# Patient Record
Sex: FEMALE | Race: WHITE | NOT HISPANIC OR LATINO | Employment: FULL TIME | URBAN - METROPOLITAN AREA
[De-identification: names, ages, dates, MRNs, and addresses within clinical notes are randomized per-mention and may not be internally consistent; named-entity substitution may affect disease eponyms.]

---

## 2017-07-17 ENCOUNTER — ALLSCRIPTS OFFICE VISIT (OUTPATIENT)
Dept: OTHER | Facility: OTHER | Age: 32
End: 2017-07-17

## 2017-07-17 DIAGNOSIS — E78.5 HYPERLIPIDEMIA: ICD-10-CM

## 2017-07-17 DIAGNOSIS — F32.9 MAJOR DEPRESSIVE DISORDER, SINGLE EPISODE: ICD-10-CM

## 2017-08-21 ENCOUNTER — ALLSCRIPTS OFFICE VISIT (OUTPATIENT)
Dept: OTHER | Facility: OTHER | Age: 32
End: 2017-08-21

## 2017-10-09 ENCOUNTER — GENERIC CONVERSION - ENCOUNTER (OUTPATIENT)
Dept: OTHER | Facility: OTHER | Age: 32
End: 2017-10-09

## 2017-10-24 ENCOUNTER — HOSPITAL ENCOUNTER (EMERGENCY)
Facility: HOSPITAL | Age: 32
Discharge: HOME | End: 2017-10-24
Attending: EMERGENCY MEDICINE | Admitting: EMERGENCY MEDICINE
Payer: COMMERCIAL

## 2017-10-24 ENCOUNTER — ALLSCRIPTS OFFICE VISIT (OUTPATIENT)
Dept: OTHER | Facility: OTHER | Age: 32
End: 2017-10-24

## 2017-10-24 VITALS
SYSTOLIC BLOOD PRESSURE: 118 MMHG | RESPIRATION RATE: 18 BRPM | DIASTOLIC BLOOD PRESSURE: 67 MMHG | HEART RATE: 83 BPM | WEIGHT: 121.03 LBS | OXYGEN SATURATION: 99 %

## 2017-10-24 DIAGNOSIS — F32.A DEPRESSION: Primary | ICD-10-CM

## 2017-10-24 DIAGNOSIS — R45.851 SUICIDAL THOUGHTS: ICD-10-CM

## 2017-10-24 LAB
AMPHETAMINES SERPL QL SCN: NEGATIVE
BARBITURATES UR QL: NEGATIVE
BENZODIAZ UR QL: NEGATIVE
COCAINE UR QL: NEGATIVE
ETHANOL EXG-MCNC: 0 MG/DL
EXT PREG TEST URINE: NEGATIVE
METHADONE UR QL: NEGATIVE
OPIATES UR QL SCN: NEGATIVE
PCP UR QL: NEGATIVE
THC UR QL: NEGATIVE

## 2017-10-24 PROCEDURE — 81025 URINE PREGNANCY TEST: CPT | Performed by: EMERGENCY MEDICINE

## 2017-10-24 PROCEDURE — 82075 ASSAY OF BREATH ETHANOL: CPT | Performed by: EMERGENCY MEDICINE

## 2017-10-24 PROCEDURE — 80307 DRUG TEST PRSMV CHEM ANLYZR: CPT | Performed by: EMERGENCY MEDICINE

## 2017-10-24 PROCEDURE — 99285 EMERGENCY DEPT VISIT HI MDM: CPT

## 2017-10-24 RX ORDER — ESCITALOPRAM OXALATE 20 MG/1
5 TABLET ORAL DAILY
COMMUNITY
End: 2018-07-16 | Stop reason: ALTCHOICE

## 2017-10-24 RX ORDER — DROSPIRENONE AND ETHINYL ESTRADIOL 0.03MG-3MG
1 KIT ORAL DAILY
COMMUNITY
End: 2018-07-16 | Stop reason: ALTCHOICE

## 2017-10-24 NOTE — DISCHARGE INSTRUCTIONS
Depression   WHAT YOU NEED TO KNOW:   What is depression? Depression is a medical condition that causes feelings of sadness or hopelessness that do not go away  Depression may cause you to lose interest in things you used to enjoy  These feelings may interfere with your daily life  What causes or increases my risk for depression? Depression may be caused by changes in brain chemicals that affect your mood  Your risk for depression may be higher if you have any of the following:  · Stressful events such as the death of a loved one, unemployment, childhood trauma, divorce, or domestic abuse    · A chronic medical condition such as diabetes, heart disease, or cancer    · Parents, siblings, or other family members with a history of depression    · Drug or alcohol abuse  What are the signs and symptoms of depression? · Appetite changes, or weight gain or loss    · Trouble going to sleep or staying asleep, or sleeping too much    · Fatigue or lack of energy    · Feeling restless, irritable, or withdrawn    · Feeling worthless, hopeless, discouraged, or guilty    · Trouble concentrating, remembering things, doing daily tasks, or making decisions    · Thoughts about hurting or killing yourself  How is depression diagnosed? Your healthcare provider will ask about your symptoms and how long you have had them  He or she will ask if you have any family members with depression  Tell your healthcare provider about any stressful events in your life  He or she may ask about any other health conditions or medicines you take  How is depression treated? · Therapy  may be used to treat your depression  A therapist will help you learn to cope with your thoughts and feelings  This can be done alone or in a group  It may also be done with family members or a significant other  · Antidepressant medicine  may be given to improve or balance your mood   You may need to take this medicine for several weeks before you begin to feel better  Tell your healthcare provider about any side effects or problems you have with your medicine  The type or amount of medicine may need to be changed  How can I manage depression? · Get regular physical activity  Try to exercise for 30 minutes, 3 to 5 days a week  Work with your healthcare provider to develop an exercise plan that you enjoy  Physical activity may improve your symptoms  · Get enough sleep  Create a routine to help you relax before bed  You can listen to music, read, or do yoga  Try to go to bed and wake up at the same time every day  Sleep is important for emotional health  · Eat a variety of healthy foods from all of the food groups  A healthy meal plan is low in fat, salt, and added sugar  Ask your healthcare provider for more information about a meal plan that is right for you  · Do not drink alcohol or use drugs  Alcohol and drugs can make your symptoms worse  Call 911 for any of the following:   · You think about harming yourself or someone else  When should I contact my healthcare provider? · Your symptoms do not improve  · You cannot make it to your next appointment  · You have new symptoms  · You have questions or concerns about your condition or care  CARE AGREEMENT:   You have the right to help plan your care  Learn about your health condition and how it may be treated  Discuss treatment options with your caregivers to decide what care you want to receive  You always have the right to refuse treatment  The above information is an  only  It is not intended as medical advice for individual conditions or treatments  Talk to your doctor, nurse or pharmacist before following any medical regimen to see if it is safe and effective for you  © 2017 [address] [employer] Information is for End User's use only and may not be sold, redistributed or otherwise used for commercial purposes   All illustrations and images included in CareNotes® are the copyrighted property of [employer]  or [address]  Suicide Prevention for Adults   WHAT YOU NEED TO KNOW:   What do I need to know about suicide prevention? A person may see suicide as the only way to escape emotional or physical pain and suffering  You can help provide emotional support for him or her and get the help he or she needs  Learn to recognize warning signs that the person may be considering suicide  Find resources to help prevent him or her from attempting to take his or her life  What should I do if I think the person is considering suicide? Call 911 if you feel the person is at immediate risk of suicide, or if he or she talks about an active suicide plan  Assume that the person intends to carry out his or her plan  Resources are available to help you and the person  The following are some things you can do:  · Call the [address] [place] [address] at 1-875-424-TALK (8225)   · Call the Suicide Hotline at 1-646-ETNFFWK (9-712.657.7422)   · Contact the person's therapist  His or her healthcare provider can give you a list of therapists if he or she does not have one  · Keep medicines, weapons, and alcohol out of the person's reach  Do not leave the person alone if he or she says they want to commit suicide  Do not leave the person alone if you think he or she may try it  Make sure you do not put yourself at risk if the person has a weapon  · Do not be afraid to ask if the person is thinking of ending his or her life  Ask if the person has a plan for hurting or killing himself or herself  What warning signs should I watch for?    · Talking about a plan for committing suicide, or suddenly deciding to make a will    · Cutting himself or herself, burning the skin with cigarettes, or driving recklessly    · Drug or alcohol use, not taking prescribed medicine, or taking too much prescribed medicine    · Sudden anger, lashing out at others, or seeming hopeless, anxious, or angry and then suddenly becoming happy or peaceful    · Not wanting to spend time with others or doing things he or she usually enjoys    · Trouble at work, or not showing up for work    · A change in the way he or she eats, sleeps, or dresses    · Weight gain or loss or having less energy than usual    · Trouble sleeping or spending a lot of time sleeping    · Giving away or throwing away his belongings  What increases the risk for suicide? · Depression or chronic sleep disorders such as insomnia    · Alcohol or drug use    · Death of an important person, or the anniversary of that person's death    · A past suicide attempt, or someone close to him or her attempted or committed suicide    · Mental illness, such as schizophrenia, bipolar disorder, or posttraumatic stress disorder (PTSD)     · Chronic pain, or a serious illness, such as heart disease, cancer, or AIDS    · Being physically dependent on others    · Mental, physical, or sexual abuse    · A history of violence or aggression toward others, or feeling guilty for hurting someone else    · Stress from divorce or a breakup, or loss of a friendship, or loneliness    · Struggling with being pruett, lesbian, or bisexual    · Stress from the loss of a job, or a stressful job  How will healthcare providers help the person? · Healthcare providers will ask questions about the person's suicide thoughts and plans  They will ask how often he or she thinks about suicide and if he or she has tried it before  They will ask if he or she hurt himself, such as with cutting or reckless driving  They may ask if he or she has access to weapons or drugs  · A healthcare provider will help the person create a safety plan  The plan includes a list of people or groups to contact if he or she has suicidal thoughts again  The list may include friends, family members, a spiritual leader, and others he or she trusts   The person may be asked to make a verbal agreement or sign a contract that he or she will not try to harm himself  What treatment may the person need? · Medicines  may be given to prevent mood swings, or to decrease anxiety or depression  The person will need to take all medicines as directed  A sudden stop can be harmful  It may take 4 to 6 weeks for the medicine to help him or her feel better  · A therapist  can help the person identify and change negative feelings or beliefs about himself or herself  This may also help change the way the he or she feels and acts  A therapist can also help the person find ways to cope with things that cannot be changed  What can I do to help the person? · Encourage the person to seek help for drug or alcohol abuse  Drugs and alcohol can increase suicidal thoughts and make the person more likely to act on them  · Help the person connect with others  Encourage him or her to become involved in the community  Some examples include tutoring a young student, volunteering at a [employer], or joining a group exercise program  The person may need help setting up a computer or creating an e-mail account to help him or her remain connected to others  · Exercise with the person  Exercise can lift his or her mood, increase energy, and make it easier to sleep at night  · Encourage the person to try new things  Adults who are open to new experiences handle stress and change better than those who are not  · Call, visit, or send postcards to the person often  Check on him or her after the loss of a pet, longtime friend, or child  Holidays, birthdays, and anniversaries can be difficult for a person after a loss  The loss of a spouse can be especially painful and lonely  · Help the person schedule a visit with his or her Yarsani or spiritual leader  A Yarsani or spiritual leader may be able to offer additional support and resources to the person       · Help the person get equipment that will increase his or her comfort and mobility  Examples are hearing aids, glasses, large print books, and walkers  These can help him or her enjoy activities and feel more independent  · Encourage the person to continue taking medicine and going to therapy  Medicine and therapy can help improve his or her mental health  Where can I find support and more information? · [facility] , [address] [place] [address]  Phone: 1- 487 - 045-HHBM (01 33 43 04 02)  Web Address: [employer]  · Suicide Awareness [employer]  [address] [place] [address] [address] , [address]  Phone: 9- 320 - 934-7282  Web Address: Vovici  org  Call 910 if:   · The person has done something on purpose to hurt himself or herself  · The person tries to commit suicide  When should I seek immediate care? · The person tells you he or she made a plan to commit suicide  · The person acts out in anger, is reckless, or is abusing alcohol or drugs  · The person has serious thoughts of suicide, even with treatment  When should I contact the person's healthcare provider or therapist?   · You begin to see warning signs that the person may be considering suicide  · The person has intense feelings of sadness, anger, revenge, or despair, or he cannot make decisions easily  · The person tells you he or she has more thoughts of suicide when they are alone  · The person withdraws from others  · The person stops eating, or begins to smoke or drink heavily  · The person feels he or she is a burden because of a disability or disease  · The person has trouble dealing with stress, such as a breakup or a job loss  · You have questions or concerns about the person's condition or care  CARE AGREEMENT:   You have the right to help plan your care  Learn about your health condition and how it may be treated   Discuss treatment options with your caregivers to decide what care you want to receive  You always have the right to refuse treatment  The above information is an  only  It is not intended as medical advice for individual conditions or treatments  Talk to your doctor, nurse or pharmacist before following any medical regimen to see if it is safe and effective for you  © 2017 [address] Information is for End User's use only and may not be sold, redistributed or otherwise used for commercial purposes  All illustrations and images included in CareNotes® are the copyrighted property of [employer]  or [address]

## 2017-10-24 NOTE — ED NOTES
Pt was moved to room #21 without incident  Pt belongings are in locker #21  Pt is cooperative and has no issues at this time        Devika Franco  10/24/17 8993

## 2017-10-24 NOTE — ED NOTES
Pt presents to ED after her PCP suggested she come for further evaluation  Intake / SA completed  Pt reports while she was at the Dr's  today she relayed that she has been having increased depression and SI, without any plan  Pt states she has had thoughts in the past, but has never acted on them and would not  Pt denies HI, A/H or V/H  Pt reports taking medication for her depression and agreed to follow up with her Psychologist as well as her PCP  She agreed that she could contract for safety and has the support of her family  Pt was provided with referrals for out patient services  She was also given the 24 hour crisis hotline  It was agreed that pt could be discharged home

## 2017-10-24 NOTE — ED PROVIDER NOTES
History  Chief Complaint   Patient presents with    Suicidal     Pt presents to the ED for evaluation of suicidal thoughts, reports having them "daily " Pt denies ever "attempting" or HI  Pt states "I have never come up with a way to do it " Pt went to PCP today to seek help and was sent here  Pt calm and cooperative on arrival     70-year-old female presents to the emergency department for evaluation of suicidal thoughts  Patient states that she has been having daily suicidal thoughts for the past 1 year or so but over the past few months they have gotten more frequent  Patient has been taking Lexapro prescribed by her PCP  Initially this seemed to help her symptoms but she does not feel that it is working now  She denies hearing voices  She denies any history of actually hurting herself  She does not have a discrete plan  She denies prior psychiatric hospitalizations  She does not follow with a psychiatrist   She denies drug use does admit to drinking alcohol occasionally  No history of abuse  History provided by:  Patient   used: No    Depression   Presenting symptoms: depression and suicidal thoughts    Presenting symptoms: no homicidal ideas and no suicide attempt    Patient accompanied by:  Family member  Degree of incapacity (severity): Moderate  Onset quality:  Gradual  Timing:  Constant  Progression:  Worsening  Chronicity:  Recurrent  Context: alcohol use    Context: not drug abuse, not recent medication change and not stressful life event    Treatment compliance: All of the time  Relieved by:  Nothing  Worsened by:  Nothing  Ineffective treatments:  Antidepressants  Associated symptoms: anhedonia, anxiety, feelings of worthlessness and irritability    Associated symptoms: no weight change    Risk factors: no hx of suicide attempts        Prior to Admission Medications   Prescriptions Last Dose Informant Patient Reported?  Taking?   drospirenone-ethinyl estradiol (Anderson Christianson) 3-0 03 MG per tablet   Yes Yes   Sig: Take 1 tablet by mouth daily   escitalopram (LEXAPRO) 20 mg tablet   Yes Yes   Sig: Take 5 mg by mouth daily      Facility-Administered Medications: None       Past Medical History:   Diagnosis Date    Depression     History of PCOS        History reviewed  No pertinent surgical history  History reviewed  No pertinent family history  I have reviewed and agree with the history as documented  Social History   Substance Use Topics    Smoking status: Never Smoker    Smokeless tobacco: Not on file    Alcohol use Yes        Review of Systems   Constitutional: Positive for irritability  Psychiatric/Behavioral: Positive for depression, dysphoric mood, sleep disturbance and suicidal ideas  Negative for homicidal ideas  The patient is nervous/anxious  All other systems reviewed and are negative  Physical Exam  ED Triage Vitals [10/24/17 1352]   Temp Pulse Respirations Blood Pressure SpO2   -- 83 18 118/67 99 %      Temp src Heart Rate Source Patient Position - Orthostatic VS BP Location FiO2 (%)   -- Monitor Sitting Right arm --      Pain Score       --           Physical Exam   Constitutional: She is oriented to person, place, and time  She appears well-developed and well-nourished  HENT:   Head: Normocephalic  Right Ear: External ear normal    Left Ear: External ear normal    Nose: Nose normal    Mouth/Throat: Oropharynx is clear and moist    Eyes: EOM and lids are normal  Pupils are equal, round, and reactive to light  Neck: Normal range of motion  Neck supple  Pulmonary/Chest: Effort normal  No respiratory distress  Musculoskeletal: Normal range of motion  She exhibits no deformity  Neurological: She is alert and oriented to person, place, and time  Skin: Skin is warm and dry  Psychiatric: Her speech is normal  Judgment normal  She is not actively hallucinating  Cognition and memory are normal  She exhibits a depressed mood   She expresses suicidal ideation  She expresses no suicidal plans  Flat affect, poor eye contact   Nursing note and vitals reviewed  ED Medications  Medications - No data to display    Diagnostic Studies  Labs Reviewed   RAPID DRUG SCREEN, URINE - Normal       Result Value Ref Range Status    Amph/Meth UR Negative  Negative Final    Barbiturate Ur Negative  Negative Final    Benzodiazepine Urine Negative  Negative Final    Cocaine Urine Negative  Negative Final    Methadone Urine Negative  Negative Final    Opiate Urine Negative  Negative Final    PCP Ur Negative  Negative Final    THC Urine Negative  Negative Final    Narrative:     FOR MEDICAL PURPOSES ONLY  IF CONFIRMATION NEEDED PLEASE CONTACT THE LAB WITHIN 5 DAYS  Drug Screen Cutoff Levels:  AMPHETAMINE/METHAMPHETAMINES  1000 ng/mL  BARBITURATES     200 ng/mL  BENZODIAZEPINES     200 ng/mL  COCAINE      300 ng/mL  METHADONE      300 ng/mL  OPIATES      300 ng/mL  PHENCYCLIDINE     25 ng/mL  THC       50 ng/mL   POCT ALCOHOL BREATH TEST - Normal    EXTBreath Alcohol 0 000   Final   POCT PREGNANCY, URINE - Normal    EXT PREG TEST UR (Ref: Negative) negative   Final       No orders to display       Procedures  Procedures      Phone Contacts  ED Phone Contact    ED Course  ED Course as of Oct 24 1909   Tue Oct 24, 2017   1559 Patient evaluated by crisis  Patient has vague chronic suicidal thoughts but no plan or intent to harm herself  She resides with her family who is very supportive  Patient is able to contract for safety  She will follow up as an outpatient with therapy and partial program   Discussed signs and symptoms to return                                  MDM  Number of Diagnoses or Management Options  Depression: new and requires workup  Suicidal thoughts: new and requires workup     Amount and/or Complexity of Data Reviewed  Clinical lab tests: reviewed and ordered  Review and summarize past medical records: yes  Discuss the patient with other providers: yes    Risk of Complications, Morbidity, and/or Mortality  General comments: 62-year-old female with chronic depression and recurrent suicidal thoughts  Patient has no discrete plan and is able to contract for safety  She was evaluated by crisis who has provided her with outpatient referrals  Discussed signs and symptoms to return to the emergency department  Patient Progress  Patient progress: stable    CritCare Time    Disposition  Final diagnoses:   Depression   Suicidal thoughts     ED Disposition     ED Disposition Condition Comment    Discharge  [facility] discharge to home/self care  Condition at discharge: Stable        Follow-up Information     Follow up With Specialties Details Why Contact Info    Monse Ugalde MD Family Medicine Schedule an appointment as soon as possible for a visit in 1 day For recheck of current symptoms [address] [place] [address]  [phone]          Discharge Medication List as of 10/24/2017  4:08 PM      CONTINUE these medications which have NOT CHANGED    Details   drospirenone-ethinyl estradiol (AGATA) 3-0 03 MG per tablet Take 1 tablet by mouth daily, Historical Med      escitalopram (LEXAPRO) 20 mg tablet Take 5 mg by mouth daily, Historical Med           No discharge procedures on file      ED Provider  Electronically Signed by       Frantz Parker DO  10/24/17 5508

## 2017-12-21 ENCOUNTER — ALLSCRIPTS OFFICE VISIT (OUTPATIENT)
Dept: OTHER | Facility: OTHER | Age: 32
End: 2017-12-21

## 2018-01-08 ENCOUNTER — GENERIC CONVERSION - ENCOUNTER (OUTPATIENT)
Dept: OTHER | Facility: OTHER | Age: 33
End: 2018-01-08

## 2018-01-13 VITALS
DIASTOLIC BLOOD PRESSURE: 62 MMHG | OXYGEN SATURATION: 98 % | HEIGHT: 58 IN | WEIGHT: 123 LBS | BODY MASS INDEX: 25.82 KG/M2 | RESPIRATION RATE: 16 BRPM | SYSTOLIC BLOOD PRESSURE: 98 MMHG | HEART RATE: 80 BPM

## 2018-01-13 VITALS
SYSTOLIC BLOOD PRESSURE: 118 MMHG | WEIGHT: 120.38 LBS | OXYGEN SATURATION: 98 % | DIASTOLIC BLOOD PRESSURE: 72 MMHG | HEART RATE: 81 BPM | RESPIRATION RATE: 17 BRPM | HEIGHT: 58 IN | BODY MASS INDEX: 25.27 KG/M2

## 2018-01-14 VITALS
SYSTOLIC BLOOD PRESSURE: 120 MMHG | DIASTOLIC BLOOD PRESSURE: 70 MMHG | HEIGHT: 58 IN | HEART RATE: 76 BPM | BODY MASS INDEX: 25.19 KG/M2 | RESPIRATION RATE: 16 BRPM | WEIGHT: 120 LBS

## 2018-01-18 NOTE — PROGRESS NOTES
Assessment    1  Arthralgia of temporomandibular joint (524 62) (M26 62)    Plan  Arthralgia of temporomandibular joint    · Medrol 4 MG Oral Tablet Therapy Pack (MethylPREDNISolone); take as directed    Discussion/Summary  Discussion Summary:   Stop ibuprofen  Take prednisone as directed  Warm or cold compresses to right side of face  Avoid opening mouth very wide  Avoid excessive chewing  Soft foods  If advancing numbness or noted rash of face, proceed to further medical care  Follow-up with family doctor as needed  Medication Side Effects Reviewed: Possible side effects of new medications were reviewed with the patient/guardian today  Understands and agrees with treatment plan: The treatment plan was reviewed with the patient/guardian  The patient/guardian understands and agrees with the treatment plan   Counseling Documentation With Imm: The patient was counseled regarding instructions for management  Chief Complaint    1  Facial Pain  Chief Complaint Free Text Note Form: Pt states has been feeling facial pain on right side with numbness,pt denies any trauma or injured, pt feeling also right ear pain with slighty sore throat  History of Present Illness  HPI: 65-year-old female with pain in her right jaw area that started Monday  She noted it when she was opening her mouth real wide to eat  It has been progressively worsening  She now notices some discomfort in the ear with a mild sore throat and a little bit and numbness tingling at the angle of her jaw  No fever or chills rashes difficulty breathing chest pain      Review of Systems  Focused-Female:   Constitutional: No fever, no chills, feels well, no tiredness, no recent weight gain or loss  ENT: as noted in HPI  Cardiovascular: no complaints of slow or fast heart rate, no chest pain, no palpitations, no leg claudication or lower extremity edema     Respiratory: no complaints of shortness of breath, no wheezing, no dyspnea on exertion, no orthopnea or PND  Musculoskeletal: no complaints of arthralgia, no myalgia, no joint swelling or stiffness, no limb pain or swelling  Integumentary: no complaints of skin rash or lesion, no itching or dry skin, no skin wounds  Neurological: as noted in HPI  Active Problems    1  Fatigue (780 79) (R53 83)   2  Hirsutism (704 1) (L68 0)   3  Major depression (296 20) (F32 9)   4  Polycystic ovarian syndrome (256 4) (E28 2)   5  Polycystic ovarian syndrome (256 4) (E28 2)   6  Pre-syncope (780 2) (R55)   7  Severe single current episode of major depressive disorder, without psychotic features   (296 23) (F32 2)   8  Well adult on routine health check (V70 0) (Z00 00)    Past Medical History    1  Abnormal weight gain (783 1) (R63 5)   2  History of Acute upper respiratory infection (465 9) (J06 9)   3  History of Acute upper respiratory infection (465 9) (J06 9)   4  History of Cat Bite (E906 3)   5  History of Cellulitis of arm (682 3) (L03 119)   6  Dysmenorrhea (625 3) (N94 6)   7  History of abdominal pain (V13 89) (Z87 898)   8  History of acne (V13 3) (Z87 2)   9  History of tinea corporis (V12 09) (Z86 19)   10  Type 2 diabetes mellitus (250 00) (E11 9)  Active Problems And Past Medical History Reviewed: The active problems and past medical history were reviewed and updated today  Family History  Mother    1  Family history of depression (V17 0) (Z81 8)  Family History Reviewed: The family history was reviewed and updated today  Social History    · Alcohol Use (History)   · Caffeine Use   · Never A Smoker   · Occupation:  Social History Reviewed: The social history was reviewed and updated today  Surgical History    1  Denied: History of Recent Surgery  Surgical History Reviewed: The surgical history was reviewed and updated today  Current Meds   1  Ocella 3-0 03 MG Oral Tablet; TAKE 1 TABLET DAILY AS DIRECTED;    Therapy: 25Apr2013 to (Evaluate:07Nov2013)  Requested for: 63NAO5625; Last   Rx:[id number] Ordered   2  Sertraline HCl - 100 MG Oral Tablet; TAKE 1 TABLET DAILY; Therapy: 78SPS1770 to (Evaluate:09Oct2016)  Requested for: 60QNP7514; Last   Rx:[id number] Ordered   3  Sertraline HCl - 25 MG Oral Tablet; TAKE 1 TABLET DAILY AS DIRECTED; Therapy: 10AOD1451 to (Evaluate:17Oct2016)  Requested for: 59CWK5241; Last   Rx:[id number] Ordered  Medication List Reviewed: The medication list was reviewed and updated today  Allergies    1  Amoxil TABS    Vitals  Signs   Recorded: 08QOI4597 75:50UM   Systolic: 457  Diastolic: 68  Heart Rate: 92  Respiration: 18  Temperature: 98 F  O2 Saturation: 97  Height: 4 ft 10 in  Weight: 118 lb 2 oz  BMI Calculated: 24 69  BSA Calculated: 1 46    Physical Exam    Constitutional   General appearance: No acute distress, well appearing and well nourished  Eyes   Conjunctiva and lids: No swelling, erythema or discharge  Pupils and irises: Equal, round and reactive to light  Ears, Nose, Mouth, and Throat   External inspection of ears and nose: Normal     Otoscopic examination: Tympanic membranes translucent with normal light reflex  Canals patent without erythema  Nasal mucosa, septum, and turbinates: Normal without edema or erythema  Oropharynx: Abnormal   Right TMJ tender to palpation  Limited range of motion of jaw  Pulmonary   Respiratory effort: No increased work of breathing or signs of respiratory distress  Auscultation of lungs: Clear to auscultation  Cardiovascular   Palpation of heart: Normal PMI, no thrills  Auscultation of heart: Normal rate and rhythm, normal S1 and S2, without murmurs  Lymphatic   Palpation of lymph nodes in neck: No lymphadenopathy  Good range of motion of neck  Musculoskeletal   Gait and station: Normal     Digits and nails: Normal without clubbing or cyanosis      Inspection/palpation of joints, bones, and muscles: Normal     Skin   Skin and subcutaneous tissue: Normal without rashes or lesions  No abnormal lesions of right ear or face  Neurologic   Cranial nerves: Cranial nerves 2-12 intact  Except for abnormality of sensation angle of jaw  Sensation: Abnormal   Mild decreased sensation angle of jaw on right with light touch versus left  Psychiatric   Orientation to person, place, and time: Normal     Mood and affect: Normal     Additional Exam:  Symmetrical facial features  Moves all extremities well  Gait is normal       Future Appointments    Date/Time Provider Specialty Site   08/16/2016 01:00 PM Elizabeth Duncan, [facility]  [place]     Signatures  Electronically signed by :  Charissa López, [facility]; Jul 23 2016 11:35AM EST                       (Author)

## 2018-01-22 VITALS
DIASTOLIC BLOOD PRESSURE: 68 MMHG | WEIGHT: 123 LBS | HEART RATE: 76 BPM | SYSTOLIC BLOOD PRESSURE: 114 MMHG | BODY MASS INDEX: 25.82 KG/M2 | RESPIRATION RATE: 16 BRPM | HEIGHT: 58 IN

## 2018-01-23 VITALS
WEIGHT: 127 LBS | HEART RATE: 78 BPM | TEMPERATURE: 98.8 F | RESPIRATION RATE: 16 BRPM | HEIGHT: 58 IN | DIASTOLIC BLOOD PRESSURE: 62 MMHG | BODY MASS INDEX: 26.66 KG/M2 | SYSTOLIC BLOOD PRESSURE: 100 MMHG | OXYGEN SATURATION: 98 %

## 2018-01-23 NOTE — MISCELLANEOUS
Message  Return to work or school:   Jailyn Stephen is under my professional care  She was seen in my office on 12/21/2017        Calista Coppola PA-C        Signatures   Electronically signed by : Eloy Aly, ; Dec 21 2017 11:54AM EST                       (Author)

## 2018-01-24 VITALS
SYSTOLIC BLOOD PRESSURE: 110 MMHG | HEIGHT: 58 IN | HEART RATE: 83 BPM | OXYGEN SATURATION: 99 % | RESPIRATION RATE: 14 BRPM | WEIGHT: 129 LBS | BODY MASS INDEX: 27.08 KG/M2 | DIASTOLIC BLOOD PRESSURE: 62 MMHG

## 2018-02-05 ENCOUNTER — TELEPHONE (OUTPATIENT)
Dept: FAMILY MEDICINE CLINIC | Facility: CLINIC | Age: 33
End: 2018-02-05

## 2018-02-05 DIAGNOSIS — F32.A DEPRESSION, UNSPECIFIED DEPRESSION TYPE: Primary | ICD-10-CM

## 2018-02-05 NOTE — TELEPHONE ENCOUNTER
Natalie Graham called and stated that you would not refill any of her meds until she is seen by Psych  Her therapist is on maternity leave and she is trying to get in with [facility] but will need to be referred by you  Is this possible to do for her?

## 2018-02-28 ENCOUNTER — TELEPHONE (OUTPATIENT)
Dept: FAMILY MEDICINE CLINIC | Facility: CLINIC | Age: 33
End: 2018-02-28

## 2018-02-28 NOTE — TELEPHONE ENCOUNTER
Pt states you started her on lexapro and she feels that it is not working  She would like to stop taking it and is asking for guidelines on how to wean herself off of it  I offered her an appointment with you to discuss this in person and to see if there was anything further she should do, but she declined and just ask that I send you a message

## 2018-03-01 NOTE — TELEPHONE ENCOUNTER
Please call patient back and let her know that I would like her to come in for a follow-up to discuss other treatment options and weaning off of the lexapro

## 2018-04-06 RX ORDER — DROSPIRENONE AND ETHINYL ESTRADIOL 0.03MG-3MG
1 KIT ORAL DAILY
COMMUNITY
Start: 2013-04-25 | End: 2020-04-06

## 2018-04-06 RX ORDER — BUPROPION HYDROCHLORIDE 150 MG/1
300 TABLET ORAL
COMMUNITY
Start: 2018-03-06 | End: 2022-05-26 | Stop reason: DRUGHIGH

## 2018-07-16 ENCOUNTER — OFFICE VISIT (OUTPATIENT)
Dept: FAMILY MEDICINE CLINIC | Facility: CLINIC | Age: 33
End: 2018-07-16
Payer: COMMERCIAL

## 2018-07-16 VITALS
HEIGHT: 59 IN | DIASTOLIC BLOOD PRESSURE: 60 MMHG | WEIGHT: 129 LBS | RESPIRATION RATE: 16 BRPM | OXYGEN SATURATION: 98 % | BODY MASS INDEX: 26 KG/M2 | HEART RATE: 98 BPM | SYSTOLIC BLOOD PRESSURE: 100 MMHG

## 2018-07-16 DIAGNOSIS — K21.9 GASTROESOPHAGEAL REFLUX DISEASE WITHOUT ESOPHAGITIS: ICD-10-CM

## 2018-07-16 DIAGNOSIS — Z13.220 SCREENING, LIPID: ICD-10-CM

## 2018-07-16 DIAGNOSIS — R19.8 CHANGE IN BOWEL MOVEMENT: Primary | ICD-10-CM

## 2018-07-16 PROBLEM — E28.2 PCOS (POLYCYSTIC OVARIAN SYNDROME): Status: ACTIVE | Noted: 2018-03-02

## 2018-07-16 PROBLEM — E78.5 HYPERLIPIDEMIA: Status: ACTIVE | Noted: 2017-07-17

## 2018-07-16 PROBLEM — F41.9 ANXIETY: Status: ACTIVE | Noted: 2017-08-21

## 2018-07-16 PROBLEM — F33.1 MODERATE EPISODE OF RECURRENT MAJOR DEPRESSIVE DISORDER (HCC): Status: ACTIVE | Noted: 2018-03-02

## 2018-07-16 PROBLEM — F32.A DEPRESSION: Status: ACTIVE | Noted: 2017-07-17

## 2018-07-16 PROCEDURE — 99214 OFFICE O/P EST MOD 30 MIN: CPT | Performed by: FAMILY MEDICINE

## 2018-07-16 RX ORDER — OMEPRAZOLE 40 MG/1
40 CAPSULE, DELAYED RELEASE ORAL DAILY
Qty: 30 CAPSULE | Refills: 1 | Status: SHIPPED | OUTPATIENT
Start: 2018-07-16 | End: 2018-10-11

## 2018-07-16 RX ORDER — VILAZODONE HYDROCHLORIDE 10 MG/1
10 TABLET ORAL
COMMUNITY
End: 2018-09-10 | Stop reason: ALTCHOICE

## 2018-07-16 NOTE — ASSESSMENT & PLAN NOTE
She has change in bowel movement and abdominal cramping for last 1 months since she has been started on Paxil and later it was stopped and she is on Viibryd, she will follow up with psychiatrist in 2 weeks  Advised to discussed with him as there is all symptoms could be side effect from the [address]    She has acid reflux advised to take omeprazole , and she will also try to take probiotic and advised to follow up in 3  weeks

## 2018-07-16 NOTE — PROGRESS NOTES
Assessment/Plan:    Problem List Items Addressed This Visit        Digestive    Gastroesophageal reflux disease without esophagitis    Relevant Medications    omeprazole (PriLOSEC) 40 MG capsule       Other    Change in bowel movement - Primary     She has change in bowel movement and abdominal cramping for last 1 months since she has been started on Paxil and later it was stopped and she is on Viibryd, she will follow up with psychiatrist in 2 weeks  Advised to discussed with him as there is all symptoms could be side effect from the [address]  She has acid reflux advised to take omeprazole , and she will also try to take probiotic and advised to follow up in 3  weeks         Relevant Orders    CBC and differential    Comprehensive metabolic panel    Screening, lipid    Relevant Orders    Lipid panel          Chief Complaint   Patient presents with    Abdominal Cramping     for 1 month       Subjective:   Patient ID: Nataly Chowdhury is a 34 y o  female  She says for last 1 month she has change in her bowel movements, and she has lose bowel movement twice per day and the next day she get no bowel movement so this has been going on for 1 month and she says that for she follows psychiatrist Dr River Celestin in [facility] and he has been treating her depression and she is on Wellbutrin 300 mg and she was started on Paxil then she had this change in bowel movements, he stop that and now she is on Viibryd, for last 3 weeks and she has next follow up with him on July 30th,  She feels cramping in her abdomen intermittently she denies any distension she denies any blood in the stool  She feels acid reflux burning sensation in the chest and she denies any vomiting but she feels slight nausea        Review of Systems   Constitutional: Negative for activity change, appetite change, chills, diaphoresis, fatigue, fever and unexpected weight change     HENT: Negative for congestion, dental problem, ear discharge, ear pain, facial swelling, hearing loss, mouth sores, nosebleeds, postnasal drip, rhinorrhea, sinus pain, sinus pressure, sneezing, sore throat, trouble swallowing and voice change  Eyes: Negative for photophobia, pain, discharge, redness and itching  Respiratory: Negative for cough, chest tightness, shortness of breath and wheezing  Cardiovascular: Negative for chest pain, palpitations and leg swelling  Gastrointestinal: Positive for diarrhea  Negative for abdominal distention, abdominal pain, blood in stool, constipation and nausea  Endocrine: Negative for cold intolerance, heat intolerance, polydipsia, polyphagia and polyuria  Genitourinary: Negative for dysuria, flank pain, frequency, hematuria and urgency  Musculoskeletal: Negative for arthralgias, back pain, myalgias and neck pain  Skin: Negative for color change and pallor  Allergic/Immunologic: Negative for environmental allergies and food allergies  Neurological: Negative for dizziness, weakness, light-headedness, numbness and headaches  Hematological: Negative for adenopathy  Does not bruise/bleed easily  Psychiatric/Behavioral: Negative for behavioral problems, sleep disturbance and suicidal ideas  The patient is not nervous/anxious  Objective:  Physical Exam   Constitutional: She is oriented to person, place, and time  She appears well-developed and well-nourished  HENT:   Head: Normocephalic and atraumatic  Nose: Nose normal    Mouth/Throat: Oropharynx is clear and moist  No oropharyngeal exudate  Eyes: Conjunctivae and EOM are normal  Pupils are equal, round, and reactive to light  Right eye exhibits no discharge  Left eye exhibits no discharge  No scleral icterus  Neck: Normal range of motion  Neck supple  No tracheal deviation present  No thyromegaly present  Cardiovascular: Normal rate, regular rhythm and normal heart sounds  No murmur heard    Pulmonary/Chest: Effort normal and breath sounds normal  No respiratory distress  She has no wheezes  She has no rales  Abdominal: Soft  Bowel sounds are normal  She exhibits no distension and no mass  There is no tenderness  There is no rebound and no guarding  Musculoskeletal: Normal range of motion  She exhibits no edema  Lymphadenopathy:     She has no cervical adenopathy  Neurological: She is alert and oriented to person, place, and time  She has normal reflexes  No cranial nerve deficit  Skin: Skin is warm  No rash noted  No erythema  No pallor  Psychiatric: She has a normal mood and affect  Her behavior is normal  Judgment and thought content normal    Nursing note and vitals reviewed  No past surgical history on file      Family History   Problem Relation Age of Onset    Depression Mother          Current Outpatient Prescriptions:     buPROPion (WELLBUTRIN XL) 150 mg 24 hr tablet, Take 300 mg by mouth  , Disp: , Rfl:     drospirenone-ethinyl estradiol (OCELLA) 3-0 03 MG per tablet, Take 1 tablet by mouth daily, Disp: , Rfl:     vilazodone (VIIBRYD) 10 mg tablet, Take 10 mg by mouth daily with breakfast Take 5mg od, Disp: , Rfl:     omeprazole (PriLOSEC) 40 MG capsule, Take 1 capsule (40 mg total) by mouth daily, Disp: 30 capsule, Rfl: 1    Allergies   Allergen Reactions    Amoxicillin GI Intolerance       Vitals:    07/16/18 0901   BP: 100/60   Pulse: 98   Resp: 16   SpO2: 98%   Weight: 58 5 kg (129 lb)   Height: 4' 11" (1 499 m)

## 2018-09-10 ENCOUNTER — OFFICE VISIT (OUTPATIENT)
Dept: FAMILY MEDICINE CLINIC | Facility: CLINIC | Age: 33
End: 2018-09-10
Payer: COMMERCIAL

## 2018-09-10 VITALS
RESPIRATION RATE: 16 BRPM | HEIGHT: 59 IN | BODY MASS INDEX: 26 KG/M2 | SYSTOLIC BLOOD PRESSURE: 100 MMHG | OXYGEN SATURATION: 98 % | DIASTOLIC BLOOD PRESSURE: 62 MMHG | HEART RATE: 88 BPM | WEIGHT: 129 LBS

## 2018-09-10 DIAGNOSIS — R19.8 CHANGE IN BOWEL MOVEMENT: Primary | ICD-10-CM

## 2018-09-10 DIAGNOSIS — E88.09 HYPOALBUMINEMIA: ICD-10-CM

## 2018-09-10 DIAGNOSIS — R10.30 LOWER ABDOMINAL PAIN: ICD-10-CM

## 2018-09-10 PROBLEM — K21.9 GASTROESOPHAGEAL REFLUX DISEASE WITHOUT ESOPHAGITIS: Status: RESOLVED | Noted: 2018-07-16 | Resolved: 2018-09-10

## 2018-09-10 PROCEDURE — 3008F BODY MASS INDEX DOCD: CPT | Performed by: FAMILY MEDICINE

## 2018-09-10 PROCEDURE — 99214 OFFICE O/P EST MOD 30 MIN: CPT | Performed by: FAMILY MEDICINE

## 2018-09-10 RX ORDER — DICYCLOMINE HYDROCHLORIDE 10 MG/1
10 CAPSULE ORAL
Qty: 20 CAPSULE | Refills: 0 | Status: SHIPPED | OUTPATIENT
Start: 2018-09-10 | End: 2018-10-11

## 2018-09-10 NOTE — ASSESSMENT & PLAN NOTE
GI symptoms include abdominal discomfort change in bowel movements and nausea vomiting, omeprazole did not improve symptoms, advised to stop taking sugar and less carb is advised also advised to have low-fat diet    Her labs are reviewed which shows slight [place] neutrophil but total WBC count is normal and had been and calcium is slightly low discussed about improving her diet and recheck labs in 2 months,  Ela to follow-up with gastroenterologist for the work and try been till abdominal pain

## 2018-09-10 NOTE — PROGRESS NOTES
Assessment/Plan:    Problem List Items Addressed This Visit        Other    Change in bowel movement - Primary     GI symptoms include abdominal discomfort change in bowel movements and nausea vomiting, omeprazole did not improve symptoms, advised to stop taking sugar and less carb is advised also advised to have low-fat diet  Her labs are reviewed which shows slight [place] neutrophil but total WBC count is normal and had been and calcium is slightly low discussed about improving her diet and recheck labs in 2 months,  Ela to follow-up with gastroenterologist for the work and try been till abdominal pain         Relevant Medications    dicyclomine (BENTYL) 10 mg capsule    Other Relevant Orders    Ambulatory referral to Gastroenterology    Lower abdominal pain    Relevant Medications    dicyclomine (BENTYL) 10 mg capsule    Other Relevant Orders    Ambulatory referral to Gastroenterology    Hypoalbuminemia    Relevant Orders    Comprehensive metabolic panel          Chief Complaint   Patient presents with    Follow-up       Subjective:   Patient ID: Nisa Lobo is a 34 y o  female  She has abdominal intermittent pain and cramping with sometimes loose to normal bowel movements for about 2 months now she tried to stop the lactulose with no improvement, she also tried different foods but no change, pain can be stabbing but it is mostly in the lower abdomen bilateral and other times it is mild discomfort, it can last up to 30 minutes  Denies fever chills but she get nausea and sometimes vomits she has no family history of colitis but she has 1 family member colon cancer, she says 1 time ultrasound abdomen was done for the gallbladder which was normal except she had sludge    She was given omeprazole without any improvement in her symptoms  She also follows psychiatrist and initially she took Paxil then [place] and her symptoms are going on since then psychiatrist   Gretchen Shrestha but her symptoms do not improve, and she is on Wellbutrin for long time        Review of Systems   Constitutional: Negative for activity change, appetite change, chills, diaphoresis, fatigue, fever and unexpected weight change  HENT: Negative for congestion, dental problem, ear discharge, ear pain, facial swelling, hearing loss, mouth sores, nosebleeds, postnasal drip, rhinorrhea, sinus pain, sinus pressure, sneezing, sore throat, trouble swallowing and voice change  Eyes: Negative for photophobia, pain, discharge, redness and itching  Respiratory: Negative for cough, chest tightness, shortness of breath and wheezing  Cardiovascular: Negative for chest pain, palpitations and leg swelling  Gastrointestinal: Positive for abdominal pain, nausea and vomiting  Negative for abdominal distention, blood in stool, constipation and diarrhea  Endocrine: Negative for cold intolerance, heat intolerance, polydipsia, polyphagia and polyuria  Genitourinary: Negative for dysuria, flank pain, frequency, hematuria and urgency  Musculoskeletal: Negative for arthralgias, back pain, myalgias and neck pain  Skin: Negative for color change and pallor  Allergic/Immunologic: Negative for environmental allergies and food allergies  Neurological: Negative for dizziness, weakness, light-headedness, numbness and headaches  Hematological: Negative for adenopathy  Does not bruise/bleed easily  Psychiatric/Behavioral: Negative for behavioral problems, sleep disturbance and suicidal ideas  The patient is not nervous/anxious  Objective:  Physical Exam   Constitutional: She is oriented to person, place, and time  She appears well-developed and well-nourished  HENT:   Head: Normocephalic and atraumatic  Nose: Nose normal    Mouth/Throat: Oropharynx is clear and moist  No oropharyngeal exudate  Eyes: Conjunctivae and EOM are normal  Pupils are equal, round, and reactive to light  Right eye exhibits no discharge   Left eye exhibits no discharge  No scleral icterus  Neck: Normal range of motion  Neck supple  No tracheal deviation present  No thyromegaly present  Cardiovascular: Normal rate, regular rhythm and normal heart sounds  No murmur heard  Pulmonary/Chest: Effort normal and breath sounds normal  No respiratory distress  She has no wheezes  She has no rales  Abdominal: Soft  Bowel sounds are normal  She exhibits no distension and no mass  There is no tenderness  There is no rebound  Minimal tenderness bilateral lower abdomen   Musculoskeletal: Normal range of motion  She exhibits no edema  Lymphadenopathy:     She has no cervical adenopathy  Neurological: She is alert and oriented to person, place, and time  She has normal reflexes  No cranial nerve deficit  Skin: Skin is warm  No rash noted  No erythema  No pallor  Psychiatric: She has a normal mood and affect  Her behavior is normal  Judgment and thought content normal    Nursing note and vitals reviewed  No past surgical history on file      Family History   Problem Relation Age of Onset    Depression Mother          Current Outpatient Prescriptions:     buPROPion (WELLBUTRIN XL) 150 mg 24 hr tablet, Take 300 mg by mouth  , Disp: , Rfl:     drospirenone-ethinyl estradiol (OCELLA) 3-0 03 MG per tablet, Take 1 tablet by mouth daily, Disp: , Rfl:     dicyclomine (BENTYL) 10 mg capsule, Take 1 capsule (10 mg total) by mouth 3 (three) times a day before meals, Disp: 20 capsule, Rfl: 0    omeprazole (PriLOSEC) 40 MG capsule, Take 1 capsule (40 mg total) by mouth daily (Patient not taking: Reported on 9/10/2018 ), Disp: 30 capsule, Rfl: 1    Allergies   Allergen Reactions    Amoxicillin GI Intolerance       Vitals:    09/10/18 1101   BP: 100/62   Pulse: 88   Resp: 16   SpO2: 98%   Weight: 58 5 kg (129 lb)   Height: 4' 11" (1 499 m)

## 2018-10-08 ENCOUNTER — OFFICE VISIT (OUTPATIENT)
Dept: GASTROENTEROLOGY | Facility: AMBULARY SURGERY CENTER | Age: 33
End: 2018-10-08
Payer: COMMERCIAL

## 2018-10-08 VITALS
HEART RATE: 83 BPM | HEIGHT: 59 IN | DIASTOLIC BLOOD PRESSURE: 80 MMHG | TEMPERATURE: 98.5 F | RESPIRATION RATE: 18 BRPM | SYSTOLIC BLOOD PRESSURE: 124 MMHG | WEIGHT: 126.4 LBS | BODY MASS INDEX: 25.48 KG/M2

## 2018-10-08 DIAGNOSIS — R10.13 EPIGASTRIC PAIN: Primary | ICD-10-CM

## 2018-10-08 DIAGNOSIS — R11.0 NAUSEA: ICD-10-CM

## 2018-10-08 DIAGNOSIS — R10.30 LOWER ABDOMINAL PAIN: ICD-10-CM

## 2018-10-08 PROCEDURE — 99244 OFF/OP CNSLTJ NEW/EST MOD 40: CPT | Performed by: INTERNAL MEDICINE

## 2018-10-08 RX ORDER — RANITIDINE 150 MG/1
150 CAPSULE ORAL 2 TIMES DAILY
Qty: 60 CAPSULE | Refills: 3 | Status: SHIPPED | OUTPATIENT
Start: 2018-10-08 | End: 2020-04-06

## 2018-10-08 NOTE — PROGRESS NOTES
Consultation - [facility]  Tremayne Perez 34 y o  female MRN: [id number]          Assessment & Plan:    Pleasant 82-year-old female with a history of depression, 1 to 2 months of persistent intermittent epigastric abdominal pain with associated nausea  1   Epigastric abdominal pain and nausea:  Symptoms did not respond to PPI therapy or dicyclomine, differential includes peptic ulcer disease, reflux esophagitis, biliary colic  It is also quite possible that the Wellbutrin is the cause of her symptoms  -we will start her on ranitidine 150 mg twice daily  -we will check a right upper quadrant ultrasound  -we will proceed with an upper endoscopy to evaluate for the above process is  -discussed with her the risks of the procedure including bleeding, surgery, perforation      _____________________________________________________________        CC:  Epigastric pain and discomfort    HPI:  Tremayne Perez is a 34 y  o female who was referred for evaluation of epigastric pain and discomfort  As you know this is a pleasant 82-year-old female with history of mild to moderate depression, she reports over the last several months she has had intermittent epigastric abdominal pain with a pressure-like sensation  Associated with some nausea, sometimes she the nausea is associated with hunger type pains  She had 1 episode of vomiting  She occasionally has some associated acid reflux with a nausea as well  Additionally through that she has had some change in bowel movements with summer increased urgency though her stools have been formed  Those symptoms have actually in 5 resolved  Her epigastric discomfort can be worse after eating  It can last up to 20 min  She tried 1 month PPI therapy with no improvement in her symptoms  She was also prescribed dicyclomine empirically with no improvement as well  There all this her weight has been stable  She takes rare NSAIDs    Her antidepressant had been changed, previously she had been on Lexapro and switched to Wellbutrin over the past several months  She reports that her depression anxiety fairly well controlled  No significant surgical history  Denies any tobacco   Rarely drinks  She works as a   There is a history of colon cancer in an uncle  The patient had some recent laboratory studies including LFTs and CBC which were normal         ROS:  The remainder of the ROS was negative except for the pertinent positives mentioned in HPI  Allergies: Amoxicillin    Medications:   Current Outpatient Prescriptions:     buPROPion (WELLBUTRIN XL) 150 mg 24 hr tablet, Take 300 mg by mouth  , Disp: , Rfl:     drospirenone-ethinyl estradiol (OCELLA) 3-0 03 MG per tablet, Take 1 tablet by mouth daily, Disp: , Rfl:     dicyclomine (BENTYL) 10 mg capsule, Take 1 capsule (10 mg total) by mouth 3 (three) times a day before meals (Patient not taking: Reported on 10/8/2018 ), Disp: 20 capsule, Rfl: 0    omeprazole (PriLOSEC) 40 MG capsule, Take 1 capsule (40 mg total) by mouth daily (Patient not taking: Reported on 9/10/2018 ), Disp: 30 capsule, Rfl: 1    ranitidine (ZANTAC) 150 MG capsule, Take 1 capsule (150 mg total) by mouth 2 (two) times a day, Disp: 60 capsule, Rfl: 3'    Past Medical History:   Diagnosis Date    Abnormal weight gain 03/23/2006    Acne 03/23/2006    Depression     Diabetes mellitus, type II ([facility] [address] ) 02/23/2006    Dysfunction of both eustachian tubes     last assessed 12/21/17    History of PCOS     Polycystic ovarian syndrome 01/30/2009    Severe single current episode of major depressive disorder, without psychotic features ([facility] [address] )     last assessed 06/21/16    Suicidal ideation     last assessed 10/24/17       No past surgical history on file  Family History   Problem Relation Age of Onset    Depression Mother         reports that she has never smoked   She does not have any smokeless tobacco history on file  She reports that she drinks alcohol  She reports that she does not use drugs            Physical Exam:     /80 (BP Location: Left arm, Patient Position: Sitting, Cuff Size: Standard)   Pulse 83   Temp 98 5 °F (36 9 °C) (Tympanic)   Resp 18   Ht 4' 11" (1 499 m)   Wt 57 3 kg (126 lb 6 4 oz)   BMI 25 53 kg/m²     Gen: wn/wd, NAD, somewhat flat affect  HEENT: anicteric, MMM, no cervical LAD  CVS: RRR, no m/r/g  CHEST: CTA b/l  ABD: +BS, soft, mild epigastric discomfort with deep palpation  EXT: no c/c/e  NEURO: aaox3  SKIN: NO rashes

## 2018-10-08 NOTE — LETTER
October 8, 2018     Rosa Crenshaw MD  [id number] [facility] [address]  [place] [address]    Patient: Laura Klein   YOB: 1985   Date of Visit: 10/8/2018       Dear Dr Kerry Hickey:    Thank you for referring Sheila Diaz to me for evaluation  Below are my notes for this consultation  If you have questions, please do not hesitate to call me  I look forward to following your patient along with you  Sincerely,        Javier Crespo MD        CC: No Recipients  Javier Crespo MD  10/8/2018 12:58 PM  Sign at close encounter  Consultation - [address] [facility]  Laura Klein 34 y o  female MRN: [id number]          Assessment & Plan:    Pleasant 29-year-old female with a history of depression, 1 to 2 months of persistent intermittent epigastric abdominal pain with associated nausea  1   Epigastric abdominal pain and nausea:  Symptoms did not respond to PPI therapy or dicyclomine, differential includes peptic ulcer disease, reflux esophagitis, biliary colic  It is also quite possible that the Wellbutrin is the cause of her symptoms  -we will start her on ranitidine 150 mg twice daily  -we will check a right upper quadrant ultrasound  -we will proceed with an upper endoscopy to evaluate for the above process is  -discussed with her the risks of the procedure including bleeding, surgery, perforation      _____________________________________________________________        CC:  Epigastric pain and discomfort    HPI:  Laura Klein is a 34 y  o female who was referred for evaluation of epigastric pain and discomfort  As you know this is a pleasant 29-year-old female with history of mild to moderate depression, she reports over the last several months she has had intermittent epigastric abdominal pain with a pressure-like sensation  Associated with some nausea, sometimes she the nausea is associated with hunger type pains  She had 1 episode of vomiting   She occasionally has some associated acid reflux with a nausea as well  Additionally through that she has had some change in bowel movements with summer increased urgency though her stools have been formed  Those symptoms have actually in 5 resolved  Her epigastric discomfort can be worse after eating  It can last up to 20 min  She tried 1 month PPI therapy with no improvement in her symptoms  She was also prescribed dicyclomine empirically with no improvement as well  There all this her weight has been stable  She takes rare NSAIDs  Her antidepressant had been changed, previously she had been on Lexapro and switched to Wellbutrin over the past several months  She reports that her depression anxiety fairly well controlled  No significant surgical history  Denies any tobacco   Rarely drinks  She works as a   There is a history of colon cancer in an uncle  The patient had some recent laboratory studies including LFTs and CBC which were normal         ROS:  The remainder of the ROS was negative except for the pertinent positives mentioned in HPI           Allergies: Amoxicillin    Medications:   Current Outpatient Prescriptions:     buPROPion (WELLBUTRIN XL) 150 mg 24 hr tablet, Take 300 mg by mouth  , Disp: , Rfl:     drospirenone-ethinyl estradiol (OCELLA) 3-0 03 MG per tablet, Take 1 tablet by mouth daily, Disp: , Rfl:     dicyclomine (BENTYL) 10 mg capsule, Take 1 capsule (10 mg total) by mouth 3 (three) times a day before meals (Patient not taking: Reported on 10/8/2018 ), Disp: 20 capsule, Rfl: 0    omeprazole (PriLOSEC) 40 MG capsule, Take 1 capsule (40 mg total) by mouth daily (Patient not taking: Reported on 9/10/2018 ), Disp: 30 capsule, Rfl: 1    ranitidine (ZANTAC) 150 MG capsule, Take 1 capsule (150 mg total) by mouth 2 (two) times a day, Disp: 60 capsule, Rfl: 3'    Past Medical History:   Diagnosis Date    Abnormal weight gain 03/23/2006    Acne 03/23/2006    Depression     Diabetes mellitus, type II ([facility] ) 02/23/2006    Dysfunction of both eustachian tubes     last assessed 12/21/17    History of PCOS     Polycystic ovarian syndrome 01/30/2009    Severe single current episode of major depressive disorder, without psychotic features ([facility] )     last assessed 06/21/16    Suicidal ideation     last assessed 10/24/17       No past surgical history on file  Family History   Problem Relation Age of Onset    Depression Mother         reports that she has never smoked  She does not have any smokeless tobacco history on file  She reports that she drinks alcohol  She reports that she does not use drugs            Physical Exam:     /80 (BP Location: Left arm, Patient Position: Sitting, Cuff Size: Standard)   Pulse 83   Temp 98 5 °F (36 9 °C) (Tympanic)   Resp 18   Ht 4' 11" (1 499 m)   Wt 57 3 kg (126 lb 6 4 oz)   BMI 25 53 kg/m²      Gen: wn/wd, NAD, somewhat flat affect  HEENT: anicteric, MMM, no cervical LAD  CVS: RRR, no m/r/g  CHEST: CTA b/l  ABD: +BS, soft, mild epigastric discomfort with deep palpation  EXT: no c/c/e  NEURO: aaox3  SKIN: NO rashes

## 2018-10-11 ENCOUNTER — APPOINTMENT (EMERGENCY)
Dept: CT IMAGING | Facility: HOSPITAL | Age: 33
End: 2018-10-11
Payer: COMMERCIAL

## 2018-10-11 ENCOUNTER — OFFICE VISIT (OUTPATIENT)
Dept: FAMILY MEDICINE CLINIC | Facility: CLINIC | Age: 33
End: 2018-10-11
Payer: COMMERCIAL

## 2018-10-11 ENCOUNTER — HOSPITAL ENCOUNTER (EMERGENCY)
Facility: HOSPITAL | Age: 33
Discharge: HOME | End: 2018-10-11
Attending: EMERGENCY MEDICINE | Admitting: EMERGENCY MEDICINE
Payer: COMMERCIAL

## 2018-10-11 VITALS
HEIGHT: 59 IN | HEART RATE: 92 BPM | OXYGEN SATURATION: 98 % | WEIGHT: 125 LBS | BODY MASS INDEX: 25.2 KG/M2 | DIASTOLIC BLOOD PRESSURE: 60 MMHG | SYSTOLIC BLOOD PRESSURE: 100 MMHG | TEMPERATURE: 97.3 F | RESPIRATION RATE: 16 BRPM

## 2018-10-11 VITALS
OXYGEN SATURATION: 99 % | RESPIRATION RATE: 17 BRPM | HEART RATE: 78 BPM | TEMPERATURE: 98.9 F | SYSTOLIC BLOOD PRESSURE: 95 MMHG | DIASTOLIC BLOOD PRESSURE: 55 MMHG

## 2018-10-11 DIAGNOSIS — R10.12 LEFT UPPER QUADRANT PAIN: Primary | ICD-10-CM

## 2018-10-11 DIAGNOSIS — R10.12 LEFT UPPER QUADRANT ABDOMINAL PAIN OF UNKNOWN ETIOLOGY: Primary | ICD-10-CM

## 2018-10-11 LAB
ALBUMIN SERPL BCP-MCNC: 3.6 G/DL (ref 3.5–5)
ALP SERPL-CCNC: 51 U/L (ref 46–116)
ALT SERPL W P-5'-P-CCNC: 17 U/L (ref 12–78)
ANION GAP SERPL CALCULATED.3IONS-SCNC: 8 MMOL/L (ref 4–13)
AST SERPL W P-5'-P-CCNC: 8 U/L (ref 5–45)
BASOPHILS # BLD AUTO: 0.03 THOUSANDS/ΜL (ref 0–0.1)
BASOPHILS NFR BLD AUTO: 0 % (ref 0–1)
BILIRUB SERPL-MCNC: 0.1 MG/DL (ref 0.2–1)
BILIRUB UR QL STRIP: NEGATIVE
BUN SERPL-MCNC: 10 MG/DL (ref 5–25)
CALCIUM SERPL-MCNC: 8.9 MG/DL (ref 8.3–10.1)
CHLORIDE SERPL-SCNC: 104 MMOL/L (ref 100–108)
CLARITY UR: CLEAR
CO2 SERPL-SCNC: 28 MMOL/L (ref 21–32)
COLOR UR: YELLOW
CREAT SERPL-MCNC: 0.71 MG/DL (ref 0.6–1.3)
EOSINOPHIL # BLD AUTO: 0.07 THOUSAND/ΜL (ref 0–0.61)
EOSINOPHIL NFR BLD AUTO: 1 % (ref 0–6)
ERYTHROCYTE [DISTWIDTH] IN BLOOD BY AUTOMATED COUNT: 13.7 % (ref 11.6–15.1)
EXT PREG TEST URINE: NEGATIVE
GFR SERPL CREATININE-BSD FRML MDRD: 114 ML/MIN/1.73SQ M
GLUCOSE SERPL-MCNC: 107 MG/DL (ref 65–140)
GLUCOSE UR STRIP-MCNC: NEGATIVE MG/DL
HCT VFR BLD AUTO: 42 % (ref 34.8–46.1)
HGB BLD-MCNC: 13.3 G/DL (ref 11.5–15.4)
HGB UR QL STRIP.AUTO: NEGATIVE
IMM GRANULOCYTES # BLD AUTO: 0.02 THOUSAND/UL (ref 0–0.2)
IMM GRANULOCYTES NFR BLD AUTO: 0 % (ref 0–2)
KETONES UR STRIP-MCNC: NEGATIVE MG/DL
LDH SERPL-CCNC: 111 U/L (ref 81–234)
LEUKOCYTE ESTERASE UR QL STRIP: NEGATIVE
LIPASE SERPL-CCNC: 203 U/L (ref 73–393)
LYMPHOCYTES # BLD AUTO: 1.87 THOUSANDS/ΜL (ref 0.6–4.47)
LYMPHOCYTES NFR BLD AUTO: 27 % (ref 14–44)
MCH RBC QN AUTO: 29.4 PG (ref 26.8–34.3)
MCHC RBC AUTO-ENTMCNC: 31.7 G/DL (ref 31.4–37.4)
MCV RBC AUTO: 93 FL (ref 82–98)
MONOCYTES # BLD AUTO: 0.35 THOUSAND/ΜL (ref 0.17–1.22)
MONOCYTES NFR BLD AUTO: 5 % (ref 4–12)
NEUTROPHILS # BLD AUTO: 4.52 THOUSANDS/ΜL (ref 1.85–7.62)
NEUTS SEG NFR BLD AUTO: 67 % (ref 43–75)
NITRITE UR QL STRIP: NEGATIVE
NRBC BLD AUTO-RTO: 0 /100 WBCS
PH UR STRIP.AUTO: 6 [PH] (ref 4.5–8)
PLATELET # BLD AUTO: 330 THOUSANDS/UL (ref 149–390)
PMV BLD AUTO: 9.4 FL (ref 8.9–12.7)
POTASSIUM SERPL-SCNC: 3.9 MMOL/L (ref 3.5–5.3)
PROT SERPL-MCNC: 7.4 G/DL (ref 6.4–8.2)
PROT UR STRIP-MCNC: NEGATIVE MG/DL
RBC # BLD AUTO: 4.52 MILLION/UL (ref 3.81–5.12)
SODIUM SERPL-SCNC: 140 MMOL/L (ref 136–145)
SP GR UR STRIP.AUTO: 1.02 (ref 1–1.03)
URATE SERPL-MCNC: 2.3 MG/DL (ref 2–6.8)
UROBILINOGEN UR QL STRIP.AUTO: 0.2 E.U./DL
WBC # BLD AUTO: 6.86 THOUSAND/UL (ref 4.31–10.16)

## 2018-10-11 PROCEDURE — 96360 HYDRATION IV INFUSION INIT: CPT

## 2018-10-11 PROCEDURE — 74177 CT ABD & PELVIS W/CONTRAST: CPT

## 2018-10-11 PROCEDURE — 85025 COMPLETE CBC W/AUTO DIFF WBC: CPT | Performed by: EMERGENCY MEDICINE

## 2018-10-11 PROCEDURE — 83690 ASSAY OF LIPASE: CPT | Performed by: EMERGENCY MEDICINE

## 2018-10-11 PROCEDURE — 99213 OFFICE O/P EST LOW 20 MIN: CPT | Performed by: FAMILY MEDICINE

## 2018-10-11 PROCEDURE — 36415 COLL VENOUS BLD VENIPUNCTURE: CPT | Performed by: EMERGENCY MEDICINE

## 2018-10-11 PROCEDURE — 99284 EMERGENCY DEPT VISIT MOD MDM: CPT

## 2018-10-11 PROCEDURE — 81003 URINALYSIS AUTO W/O SCOPE: CPT

## 2018-10-11 PROCEDURE — 83615 LACTATE (LD) (LDH) ENZYME: CPT | Performed by: EMERGENCY MEDICINE

## 2018-10-11 PROCEDURE — 80053 COMPREHEN METABOLIC PANEL: CPT | Performed by: EMERGENCY MEDICINE

## 2018-10-11 PROCEDURE — 96361 HYDRATE IV INFUSION ADD-ON: CPT

## 2018-10-11 PROCEDURE — 84550 ASSAY OF BLOOD/URIC ACID: CPT | Performed by: EMERGENCY MEDICINE

## 2018-10-11 PROCEDURE — 81025 URINE PREGNANCY TEST: CPT | Performed by: EMERGENCY MEDICINE

## 2018-10-11 RX ADMIN — IOHEXOL 100 ML: 350 INJECTION, SOLUTION INTRAVENOUS at 20:17

## 2018-10-11 RX ADMIN — SODIUM CHLORIDE 500 ML: 0.9 INJECTION, SOLUTION INTRAVENOUS at 19:30

## 2018-10-11 NOTE — PROGRESS NOTES
Assessment/Plan:    Problem List Items Addressed This Visit        Other    Left upper quadrant pain - Primary     Spoke with the ER physician and patient is sent to the ER for further evaluation, patient has a palpable tender mass in the left upper quadrant  Hemoccult test is negative               Chief Complaint   Patient presents with    Chest Pain       Subjective:   Patient ID: Axel Gonzalez is a 34 y o  female  She came today with complain of left upper abdominal pain around the rib area and pain is constant moderate to severe 7 to 8/10 increased with deep breath and did not relieved with ibuprofen, no radiation of the pain, denies any fever but she has nausea, she also noted her stool was darker in color this morning, she is already being evaluated by gastroenterologist for upper abdominal discomfort and change in bowel movements, she is scheduled for right upper abdominal ultrasound and then she will go for endoscopy, but recently she has developed this new pain,   She denies vomiting she denies any diarrhea  Chest Pain    Associated symptoms include abdominal pain  Pertinent negatives include no back pain, cough, diaphoresis, dizziness, fever, headaches, nausea, numbness, palpitations, shortness of breath or weakness  Review of Systems   Constitutional: Negative for activity change, appetite change, chills, diaphoresis, fatigue, fever and unexpected weight change  HENT: Negative for congestion, dental problem, ear discharge, ear pain, facial swelling, hearing loss, mouth sores, nosebleeds, postnasal drip, rhinorrhea, sinus pain, sinus pressure, sneezing, sore throat, trouble swallowing and voice change  Eyes: Negative for photophobia, pain, discharge, redness and itching  Respiratory: Negative for cough, chest tightness, shortness of breath and wheezing  Cardiovascular: Negative for chest pain, palpitations and leg swelling     Gastrointestinal: Positive for abdominal pain  Negative for abdominal distention, anal bleeding, constipation, diarrhea and nausea  Endocrine: Negative for cold intolerance, heat intolerance, polydipsia, polyphagia and polyuria  Genitourinary: Negative for dysuria, flank pain, frequency, hematuria and urgency  Musculoskeletal: Negative for arthralgias, back pain, myalgias and neck pain  Skin: Negative for color change and pallor  Allergic/Immunologic: Negative for environmental allergies and food allergies  Neurological: Negative for dizziness, weakness, light-headedness, numbness and headaches  Hematological: Negative for adenopathy  Does not bruise/bleed easily  Psychiatric/Behavioral: Negative for behavioral problems, sleep disturbance and suicidal ideas  The patient is not nervous/anxious  Objective:  Physical Exam   Constitutional: She is oriented to person, place, and time  She appears well-developed and well-nourished  HENT:   Head: Normocephalic and atraumatic  Nose: Nose normal    Mouth/Throat: Oropharynx is clear and moist  No oropharyngeal exudate  Eyes: Pupils are equal, round, and reactive to light  Conjunctivae and EOM are normal  Right eye exhibits no discharge  Left eye exhibits no discharge  No scleral icterus  Neck: Normal range of motion  Neck supple  No tracheal deviation present  No thyromegaly present  Cardiovascular: Normal rate, regular rhythm and normal heart sounds  No murmur heard  Pulmonary/Chest: Effort normal and breath sounds normal  No respiratory distress  She has no wheezes  She has no rales  Abdominal: Soft  Bowel sounds are normal  She exhibits no distension and no mass  There is tenderness  There is no rebound  Mass is palpable below the left leg ribs in the left upper abdomen quadrant   Musculoskeletal: Normal range of motion  She exhibits no edema  Lymphadenopathy:     She has no cervical adenopathy  Neurological: She is alert and oriented to person, place, and time   She has normal reflexes  No cranial nerve deficit  Skin: Skin is warm  No rash noted  No erythema  No pallor  Psychiatric: She has a normal mood and affect  Her behavior is normal  Judgment and thought content normal    Nursing note and vitals reviewed  No past surgical history on file      Family History   Problem Relation Age of Onset    Depression Mother          Current Outpatient Prescriptions:     buPROPion (WELLBUTRIN XL) 150 mg 24 hr tablet, Take 300 mg by mouth  , Disp: , Rfl:     drospirenone-ethinyl estradiol (OCELLA) 3-0 03 MG per tablet, Take 1 tablet by mouth daily, Disp: , Rfl:     ranitidine (ZANTAC) 150 MG capsule, Take 1 capsule (150 mg total) by mouth 2 (two) times a day, Disp: 60 capsule, Rfl: 3    Allergies   Allergen Reactions    Amoxicillin GI Intolerance       Vitals:    10/11/18 1514   BP: 100/60   Pulse: 92   Resp: 16   Temp: (!) 97 3 °F (36 3 °C)   SpO2: 98%   Weight: 56 7 kg (125 lb)   Height: 4' 11" (1 499 m)

## 2018-10-11 NOTE — ED PROVIDER NOTES
History  Chief Complaint   Patient presents with    Flank Pain     pt presents from PCP for left sided flank pain x 3 days, took Ibuprofen today without relief      33 YR FEMALE C/O INITIALLY OF 2 MONTHS OF LUQ PAIN -- INITIALLY INTERMITENT WITH NO TRIGGERING FACTORS -  LASTING 20 MINUTES AT A TIME-- OVER LAST 3 DAYS WITH CONSTANT SHARP LUQ PAIN --  WITH 6 LBS WEIGHT LOSS- FATIGUE - NAUSEA- NO SIGN  GERD/ DYSPEPSIA/ PROGRESSIVE DSYPHAGIA NO MELENA/BRBPR- - PAIN IS WRORSE UPON DEEP INSPIRATION  --  BUT NO CP/SOBG/COUGH /  NO HX OF VTE- NO GOMEZ/ - PT WENT TO PMD  TODAY AND SENT TO ER FOR LUQ PAIN/ ? MASS- NO /GYN COMPS         History provided by:  Patient   used: No    Flank Pain   Associated symptoms: fatigue and nausea    Associated symptoms: no chills, no constipation, no diarrhea, no fever and no vomiting        Prior to Admission Medications   Prescriptions Last Dose Informant Patient Reported? Taking? buPROPion (WELLBUTRIN XL) 150 mg 24 hr tablet  Self Yes No   Sig: Take 300 mg by mouth     drospirenone-ethinyl estradiol (OCELLA) 3-0 03 MG per tablet   Yes No   Sig: Take 1 tablet by mouth daily   ranitidine (ZANTAC) 150 MG capsule   No No   Sig: Take 1 capsule (150 mg total) by mouth 2 (two) times a day      Facility-Administered Medications: None       Past Medical History:   Diagnosis Date    Abnormal weight gain 03/23/2006    Acne 03/23/2006    Depression     Diabetes mellitus, type II ([facility] [address] ) 02/23/2006    Dysfunction of both eustachian tubes     last assessed 12/21/17    History of PCOS     Polycystic ovarian syndrome 01/30/2009    Severe single current episode of major depressive disorder, without psychotic features ([facility] [address] )     last assessed 06/21/16    Suicidal ideation     last assessed 10/24/17       History reviewed  No pertinent surgical history      Family History   Problem Relation Age of Onset    Depression Mother      I have reviewed and agree with the history as documented  Social History   Substance Use Topics    Smoking status: Never Smoker    Smokeless tobacco: Not on file    Alcohol use Yes      Comment: socially        Review of Systems   Constitutional: Positive for fatigue and unexpected weight change  Negative for activity change, appetite change, chills, diaphoresis and fever  HENT: Negative  Eyes: Negative  Respiratory: Negative  Cardiovascular: Negative  Gastrointestinal: Positive for abdominal pain and nausea  Negative for abdominal distention, anal bleeding, blood in stool, constipation, diarrhea, rectal pain and vomiting  Endocrine: Negative  Genitourinary: Positive for flank pain  Skin: Negative  Allergic/Immunologic: Negative  Neurological: Negative  Hematological: Negative  Psychiatric/Behavioral: Negative  Physical Exam  Physical Exam   Constitutional: She is oriented to person, place, and time  No distress  AVSS-- PULSE OX 99 % ON RA- INTERPRETATION IS NORMAL- NO INTERVENTION - WELL APPEARING- IN NAD    HENT:   Head: Normocephalic and atraumatic  Eyes: Pupils are equal, round, and reactive to light  Conjunctivae and EOM are normal  Right eye exhibits no discharge  Left eye exhibits no discharge  No scleral icterus  MM PINK   Neck: Normal range of motion  Neck supple  No JVD present  No tracheal deviation present  No thyromegaly present  Cardiovascular: Normal rate, regular rhythm, normal heart sounds and intact distal pulses  Exam reveals no gallop and no friction rub  No murmur heard  Pulmonary/Chest: Effort normal and breath sounds normal  No stridor  No respiratory distress  She has no wheezes  She has no rales  She exhibits no tenderness  Abdominal: Soft  Bowel sounds are normal  She exhibits no distension and no mass  There is tenderness  There is no rebound and no guarding  No hernia     NO CVA TENDERNESS- POS LUQ PAIN- NO DEFINITE  SPLENOMEGALY- NO PERITONEAL SIGNS   Musculoskeletal: Normal range of motion  She exhibits no edema, tenderness or deformity  NORMAL/EQUAL BILATERAL RADIAL/DP PULSES- NO BLE EDEMA/CLAF TENDERNESS/ASSYM/ ERYTHEMA   Lymphadenopathy:     She has no cervical adenopathy  Neurological: She is alert and oriented to person, place, and time  No cranial nerve deficit or sensory deficit  She exhibits normal muscle tone  Coordination normal    Skin: Skin is warm  Capillary refill takes less than 2 seconds  No rash noted  She is not diaphoretic  No erythema  No pallor  Psychiatric: She has a normal mood and affect  Her behavior is normal  Judgment and thought content normal    Nursing note and vitals reviewed        Vital Signs  ED Triage Vitals   Temperature Pulse Respirations Blood Pressure SpO2   10/11/18 1828 10/11/18 1827 10/11/18 1827 10/11/18 1827 10/11/18 1827   98 9 °F (37 2 °C) 101 16 113/54 98 %      Temp Source Heart Rate Source Patient Position - Orthostatic VS BP Location FiO2 (%)   10/11/18 1827 10/11/18 1827 -- -- --   Oral Monitor         Pain Score       --                  Vitals:    10/11/18 1827 10/11/18 1830   BP: 113/54 113/54   Pulse: 101 100       Visual Acuity      ED Medications  Medications   sodium chloride 0 9 % bolus 500 mL (not administered)       Diagnostic Studies  Results Reviewed     Procedure Component Value Units Date/Time    CBC and differential [[id number]]     Lab Status:  No result Specimen:  Blood     CMP [[id number]]     Lab Status:  No result Specimen:  Blood     Lipase [[id number]]     Lab Status:  No result Specimen:  Blood     POCT pregnancy, urine [[id number]]     Lab Status:  No result Specimen:  Urine     LD,Blood [[id number]]     Lab Status:  No result Specimen:  Blood     Uric acid [[id number]]     Lab Status:  No result Specimen:  Blood                  No orders to display              Procedures  Procedures       Phone Contacts  ED Phone Contact    ED Course  ED Course as of Oct 12 0155   Thu Oct 11, 2018   1300 [facility] MD MEDICAL PROCEDURE NOTE FOR TRANSABD BEDSIDE U/S;  IMAGES ON CHART- NO FLUID IN RUQ/LUQ/ AROUND HEART- NO R/L HYDRONEPHROSIS    1848 - ER MD NOTE- ON CARE EVERYWHERE  7/28/18 LAB S AT [facility] REVIEWED BY ER MD     1848 ER MD NOTE- PT OFFERED PAIN MEDICATION -- WANTS NO PAIN MEDICATION AT THIS TIME    2037 - ER MD NOTE- PT  RESTING COMFORTABLY IN NAD- AWARE OF WAITING FOR CT REPORT                                MDM  CritCare Time    Disposition  Final diagnoses:   None     ED Disposition     None      Follow-up Information    None         Patient's Medications   Discharge Prescriptions    No medications on file     No discharge procedures on file      ED Provider  Electronically Signed by           Vika Poole MD  10/12/18 5610

## 2018-10-11 NOTE — ASSESSMENT & PLAN NOTE
Spoke with the ER physician and patient is sent to the ER for further evaluation, patient has a palpable tender mass in the left upper quadrant  Hemoccult test is negative

## 2018-10-12 ENCOUNTER — ANESTHESIA EVENT (OUTPATIENT)
Dept: PERIOP | Facility: AMBULARY SURGERY CENTER | Age: 33
End: 2018-10-12
Payer: COMMERCIAL

## 2018-10-12 NOTE — DISCHARGE INSTRUCTIONS
DIAGNOSIS; LEFT UPPER ABDOMINAL PAIN     - ACTIVITY AS TOLERATED--- FROR PAIN- WOULD TRY OVER THE COUNTER TYLENOL 500 MG EVERY 4 HRS     - PLEASE RECONTACT YOUR PRIMARY DOCTOR TOMORROW  TO SCHEDULE AN APPOINTMENT FOR A RECHECK WITHIN 1 WEEK- AND TO DISCUSS FURTHER WORKUP     - PLEASE RETURN TO  THE ER FOR ANY NEW/ WORSENING/CONCERNING SYMPTOMS TO YOU

## 2018-10-15 ENCOUNTER — TRANSITIONAL CARE MANAGEMENT (OUTPATIENT)
Dept: FAMILY MEDICINE CLINIC | Facility: CLINIC | Age: 33
End: 2018-10-15

## 2018-10-15 ENCOUNTER — HOSPITAL ENCOUNTER (OUTPATIENT)
Dept: RADIOLOGY | Age: 33
Discharge: HOME | End: 2018-10-15
Payer: COMMERCIAL

## 2018-10-15 DIAGNOSIS — R10.13 EPIGASTRIC PAIN: ICD-10-CM

## 2018-10-15 DIAGNOSIS — R11.0 NAUSEA: ICD-10-CM

## 2018-10-15 PROCEDURE — 76705 ECHO EXAM OF ABDOMEN: CPT

## 2018-10-17 ENCOUNTER — TELEPHONE (OUTPATIENT)
Dept: GASTROENTEROLOGY | Facility: AMBULARY SURGERY CENTER | Age: 33
End: 2018-10-17

## 2018-10-17 NOTE — TELEPHONE ENCOUNTER
----- Message from Arthur Cisse MD sent at 10/17/2018  9:35 AM EDT -----  Please inform patient that ultrasound is normal, no evidence of gallstones  Normal appearing liver  Please find if her symptoms have improved with PPI therapy and make sure she is scheduled for the upper endoscopy  Please have the patient call with any questions or concerns

## 2018-10-24 ENCOUNTER — HOSPITAL ENCOUNTER (OUTPATIENT)
Facility: AMBULARY SURGERY CENTER | Age: 33
Setting detail: OUTPATIENT SURGERY
Discharge: HOME | End: 2018-10-24
Attending: INTERNAL MEDICINE | Admitting: INTERNAL MEDICINE
Payer: COMMERCIAL

## 2018-10-24 ENCOUNTER — ANESTHESIA (OUTPATIENT)
Dept: PERIOP | Facility: AMBULARY SURGERY CENTER | Age: 33
End: 2018-10-24
Payer: COMMERCIAL

## 2018-10-24 VITALS
WEIGHT: 125 LBS | TEMPERATURE: 97 F | RESPIRATION RATE: 18 BRPM | DIASTOLIC BLOOD PRESSURE: 55 MMHG | HEIGHT: 59 IN | OXYGEN SATURATION: 100 % | SYSTOLIC BLOOD PRESSURE: 102 MMHG | BODY MASS INDEX: 25.2 KG/M2 | HEART RATE: 77 BPM

## 2018-10-24 DIAGNOSIS — R11.0 NAUSEA: ICD-10-CM

## 2018-10-24 DIAGNOSIS — R10.13 EPIGASTRIC PAIN: ICD-10-CM

## 2018-10-24 DIAGNOSIS — R10.30 LOWER ABDOMINAL PAIN: ICD-10-CM

## 2018-10-24 LAB — EXT PREGNANCY TEST URINE: NEGATIVE

## 2018-10-24 PROCEDURE — 88342 IMHCHEM/IMCYTCHM 1ST ANTB: CPT | Performed by: PATHOLOGY

## 2018-10-24 PROCEDURE — 43239 EGD BIOPSY SINGLE/MULTIPLE: CPT | Performed by: INTERNAL MEDICINE

## 2018-10-24 PROCEDURE — 88305 TISSUE EXAM BY PATHOLOGIST: CPT | Performed by: PATHOLOGY

## 2018-10-24 PROCEDURE — 81025 URINE PREGNANCY TEST: CPT | Performed by: INTERNAL MEDICINE

## 2018-10-24 RX ORDER — SUCRALFATE ORAL 1 G/10ML
1 SUSPENSION ORAL 4 TIMES DAILY
Qty: 420 ML | Refills: 0 | Status: SHIPPED | OUTPATIENT
Start: 2018-10-24 | End: 2019-03-06 | Stop reason: ALTCHOICE

## 2018-10-24 RX ORDER — IBUPROFEN 800 MG/1
800 TABLET ORAL 2 TIMES DAILY
COMMUNITY
End: 2018-10-29

## 2018-10-24 RX ORDER — PROPOFOL 10 MG/ML
INJECTION, EMULSION INTRAVENOUS AS NEEDED
Status: DISCONTINUED | OUTPATIENT
Start: 2018-10-24 | End: 2018-10-24 | Stop reason: SURG

## 2018-10-24 RX ORDER — LIDOCAINE HYDROCHLORIDE 10 MG/ML
INJECTION, SOLUTION INFILTRATION; PERINEURAL AS NEEDED
Status: DISCONTINUED | OUTPATIENT
Start: 2018-10-24 | End: 2018-10-24 | Stop reason: SURG

## 2018-10-24 RX ORDER — SODIUM CHLORIDE 9 MG/ML
125 INJECTION, SOLUTION INTRAVENOUS CONTINUOUS
Status: DISCONTINUED | OUTPATIENT
Start: 2018-10-24 | End: 2018-10-24 | Stop reason: HOSPADM

## 2018-10-24 RX ADMIN — LIDOCAINE HYDROCHLORIDE 50 MG: 10 INJECTION, SOLUTION INFILTRATION; PERINEURAL at 11:06

## 2018-10-24 RX ADMIN — SODIUM CHLORIDE: 0.9 INJECTION, SOLUTION INTRAVENOUS at 10:38

## 2018-10-24 RX ADMIN — PROPOFOL 50 MG: 10 INJECTION, EMULSION INTRAVENOUS at 11:06

## 2018-10-24 RX ADMIN — PROPOFOL 50 MG: 10 INJECTION, EMULSION INTRAVENOUS at 11:07

## 2018-10-24 RX ADMIN — PROPOFOL 50 MG: 10 INJECTION, EMULSION INTRAVENOUS at 11:09

## 2018-10-24 NOTE — DISCHARGE INSTRUCTIONS
Discharge home  Resume regular diet  Continue ranitidine  Trial of Carafate  Follow up biopsy results  Call with any abdominal pain, bleeding, fevers  If not improved consider PPI therapy

## 2018-10-24 NOTE — ANESTHESIA POSTPROCEDURE EVALUATION
Post-Op Assessment Note      CV Status:  Stable    Mental Status:  Awake    Hydration Status:  Euvolemic    PONV Controlled:  Controlled    Airway Patency:  Patent    Post Op Vitals Reviewed: Yes          Staff: CRNA           BP   115/83   Temp      Pulse  80   Resp   22   SpO2   99 RA

## 2018-10-24 NOTE — DISCHARGE INSTR - AVS FIRST PAGE
ESOPHAGOGASTRODUODENOSCOPY    PROCEDURE: EGD    SEDATION: Monitored anesthesia care, check anesthesia records    ASA Class: 2    INDICATIONS:  Epigastric pain nausea    CONSENT:  Informed consent was obtained for the procedure, including sedation after explaining the risks and benefits of the procedure  Risks including but not limited to bleeding, perforation, infection, and missed lesion  PREPARATION:   Telemetry, pulse oximetry, blood pressure were monitored throughout the procedure  Patient was identified by myself both verbally and by visual inspection of ID band  DESCRIPTION:   Patient was placed in the left lateral decubitus position and was sedated with the above medication  The gastroscope was introduced in to the oropharynx and the esophagus was intubated under direct visualization  Scope was passed down the esophagus up to 2nd part of the duodenum  A careful inspection was made as the gastroscope was withdrawn, including a retroflexed view of the stomach; findings and interventions are described below  FINDINGS:    #1  Esophagus- normal esophagus, no evidence of reflux esophagitis    #2  Stomach- gastritis with linear gastric ulcers and erosions in the antrum, biopsies taken for H pylori  On retroflexion there is a small hiatal hernia    #3  Duodenum- normal duodenum biopsies taken         IMPRESSIONS:      Normal duodenum, biopsied  Gastritis with linear ulcers/erosions, biopsies taken  Small hiatal hernia on retroflexion  No evidence of esophagitis, normal appearing esophagus    RECOMMENDATIONS:     Discharge home  Resume regular diet  Continue ranitidine  Trial of Carafate  Follow up biopsy results  Call with any abdominal pain, bleeding, fevers  If not improved consider PPI therapy    COMPLICATIONS:  None; patient tolerated the procedure well            DISPOSITION: PACU           CONDITION: Stable      t

## 2018-10-24 NOTE — H&P
History and Physical -  [facility]  Ita Blanchard 34 y o  female MRN: [id number]    HPI: Ita Blanchard is a 34y o  year old female who presents with epigastric pain and nausea  Review of Systems    Historical Information   Past Medical History:   Diagnosis Date    Abnormal weight gain 03/23/2006    Acne 03/23/2006    Depression     Dysfunction of both eustachian tubes     last assessed 12/21/17    History of PCOS     Hyperlipidemia     Polycystic ovarian syndrome 01/30/2009    Severe single current [facility] major [facility]     last assessed 06/21/16    Suicidal ideation     last assessed 10/24/17     Past Surgical History:   Procedure Laterality Date    WISDOM TOOTH EXTRACTION       Social History   History   Alcohol Use    Yes     Comment: socially     History   Drug Use No     History   Smoking Status    Never Smoker   Smokeless Tobacco    Never Used     Family History   Problem Relation Age of Onset    Depression Mother        Meds/Allergies     Prescriptions Prior to Admission   Medication    buPROPion (WELLBUTRIN XL) 150 mg 24 hr tablet    drospirenone-ethinyl estradiol (OCELLA) 3-0 03 MG per tablet    ibuprofen (MOTRIN) 800 mg tablet    ranitidine (ZANTAC) 150 MG capsule       Allergies   Allergen Reactions    Amoxicillin GI Intolerance       Objective     Blood pressure 112/58, pulse 79, temperature 97 8 °F (36 6 °C), temperature source Temporal, resp  rate 18, height 4' 11" (1 499 m), weight 56 7 kg (125 lb), SpO2 100 %  PHYSICAL EXAM    Gen: NAD  CV: RRR  CHEST: Clear  ABD: soft, NT/ND  EXT: no edema  Neuro: AAO      ASSESSMENT/PLAN:  This is a 34y o  year old female here for  epigastric pain and nausea         PLAN:   Procedure: Amy August

## 2018-10-24 NOTE — OP NOTE
ESOPHAGOGASTRODUODENOSCOPY    PROCEDURE: EGD    SEDATION: Monitored anesthesia care, check anesthesia records    ASA Class: 2    INDICATIONS:  Epigastric pain nausea    CONSENT:  Informed consent was obtained for the procedure, including sedation after explaining the risks and benefits of the procedure  Risks including but not limited to bleeding, perforation, infection, and missed lesion  PREPARATION:   Telemetry, pulse oximetry, blood pressure were monitored throughout the procedure  Patient was identified by myself both verbally and by visual inspection of ID band  DESCRIPTION:   Patient was placed in the left lateral decubitus position and was sedated with the above medication  The gastroscope was introduced in to the oropharynx and the esophagus was intubated under direct visualization  Scope was passed down the esophagus up to 2nd part of the duodenum  A careful inspection was made as the gastroscope was withdrawn, including a retroflexed view of the stomach; findings and interventions are described below  FINDINGS:    #1  Esophagus- normal esophagus, no evidence of reflux esophagitis    #2  Stomach- gastritis with linear gastric ulcers and erosions in the antrum, biopsies taken for H pylori  On retroflexion there is a small hiatal hernia    #3  Duodenum- normal duodenum biopsies taken         IMPRESSIONS:      Normal duodenum, biopsied  Gastritis with linear ulcers/erosions, biopsies taken  Small hiatal hernia on retroflexion  No evidence of esophagitis, normal appearing esophagus    RECOMMENDATIONS:     Discharge home  Resume regular diet  Continue ranitidine  Trial of Carafate  Follow up biopsy results  Call with any abdominal pain, bleeding, fevers  If not improved consider PPI therapy    COMPLICATIONS:  None; patient tolerated the procedure well            DISPOSITION: PACU           CONDITION: Stable

## 2018-10-24 NOTE — ANESTHESIA PREPROCEDURE EVALUATION
Review of Systems/Medical History  Patient summary reviewed  Chart reviewed  No history of anesthetic complications     Cardiovascular  Exercise tolerance (METS): >4,  Hyperlipidemia,    Pulmonary  Negative pulmonary ROS        GI/Hepatic    GERD well controlled,        Negative  ROS        Endo/Other  Negative endo/other ROS      GYN  Negative gynecology ROS          Hematology  Negative hematology ROS      Musculoskeletal  Negative musculoskeletal ROS        Neurology  Negative neurology ROS      Psychology   Anxiety, Depression , being treated for depression,              Physical Exam    Airway    Mallampati score: I  TM Distance: >3 FB  Neck ROM: full     Dental   No notable dental hx     Cardiovascular      Pulmonary      Other Findings        Anesthesia Plan  ASA Score- 2     Anesthesia Type- IV sedation with anesthesia with ASA Monitors  Additional Monitors:   Airway Plan:         Plan Factors-    Induction- intravenous  Postoperative Plan-     Informed Consent- Anesthetic plan and risks discussed with patient  I personally reviewed this patient with the CRNA  Discussed and agreed on the Anesthesia Plan with the CRNA  Karrie Nissen

## 2018-10-29 ENCOUNTER — OFFICE VISIT (OUTPATIENT)
Dept: FAMILY MEDICINE CLINIC | Facility: CLINIC | Age: 33
End: 2018-10-29
Payer: COMMERCIAL

## 2018-10-29 VITALS
HEIGHT: 59 IN | DIASTOLIC BLOOD PRESSURE: 60 MMHG | SYSTOLIC BLOOD PRESSURE: 100 MMHG | HEART RATE: 81 BPM | WEIGHT: 125 LBS | RESPIRATION RATE: 16 BRPM | BODY MASS INDEX: 25.2 KG/M2 | OXYGEN SATURATION: 98 %

## 2018-10-29 DIAGNOSIS — R11.0 NAUSEA: ICD-10-CM

## 2018-10-29 DIAGNOSIS — K21.9 GASTROESOPHAGEAL REFLUX DISEASE WITHOUT ESOPHAGITIS: Primary | ICD-10-CM

## 2018-10-29 DIAGNOSIS — N20.0 RENAL CALCULUS OR STONE: ICD-10-CM

## 2018-10-29 PROCEDURE — 3008F BODY MASS INDEX DOCD: CPT | Performed by: FAMILY MEDICINE

## 2018-10-29 PROCEDURE — 99214 OFFICE O/P EST MOD 30 MIN: CPT | Performed by: FAMILY MEDICINE

## 2018-10-29 NOTE — PROGRESS NOTES
Assessment/Plan:    Problem List Items Addressed This Visit        Digestive    RESOLVED: Gastroesophageal reflux disease without esophagitis - Primary      She had recent endoscopy, she has mild gastritis and linear ulcer, GI started her on sucralfate and Zantac and she feels improvement and she will follow up with them, she has improvement on her symptoms            Genitourinary    Renal calculus or stone      On recent CT scan of her abdomen for abdominal pain it was found she has 3 mm left renal stone, advised to just monitor her fluid intake, she is currently asymptomatic            Other    Nausea     Still feels slightly nauseated, but feels improvement, she is on Zantac and sucralfate now               Chief Complaint   Patient presents with    Follow-up       Subjective:   Patient ID: Robert Goldman is a 34 y o  female  She is here follow-up on her abdominal pain, she was sent to the ER, on her last visit, she went to [facility] and had ultrasound and CT scan of abdomen and pelvis and she was sent home  next day she went to [facility] and she was diagnosed possible rib pain, she also had 3 mm left renal stone which is asymptomatic, later she had endoscopy by her gastroenterologist as she was being workup for nausea, and the lb she had mild gastritis with linear ulcers and stomach biopsy was done she will follow up with them, currently she is taking Zantac 150 mg twice a day and she is taking sucralfate and her symptoms are improving she has minimal discomfort in upper part of her abdomen and some nausea but she has normal bowel movement and normal urine,   denies vomiting fever chills   she is on Wellbutrin  From Psychiatry,        Review of Systems   Constitutional: Negative for activity change, appetite change, chills, diaphoresis, fatigue, fever and unexpected weight change     HENT: Negative for congestion, dental problem, ear discharge, ear pain, facial swelling, hearing loss, mouth sores, nosebleeds, postnasal drip, rhinorrhea, sinus pain, sinus pressure, sneezing, sore throat, trouble swallowing and voice change  Eyes: Negative for photophobia, pain, discharge, redness and itching  Respiratory: Negative for cough, chest tightness, shortness of breath and wheezing  Cardiovascular: Negative for chest pain, palpitations and leg swelling  Gastrointestinal: Negative for abdominal distention, abdominal pain, blood in stool, constipation, diarrhea and nausea  Endocrine: Negative for cold intolerance, heat intolerance, polydipsia, polyphagia and polyuria  Genitourinary: Negative for dysuria, flank pain, frequency, hematuria and urgency  Musculoskeletal: Negative for arthralgias, back pain, myalgias and neck pain  Skin: Negative for color change and pallor  Allergic/Immunologic: Negative for environmental allergies and food allergies  Neurological: Negative for dizziness, weakness, light-headedness, numbness and headaches  Hematological: Negative for adenopathy  Does not bruise/bleed easily  Psychiatric/Behavioral: Negative for behavioral problems, sleep disturbance and suicidal ideas  The patient is not nervous/anxious  Objective:  Physical Exam   Constitutional: She is oriented to person, place, and time  She appears well-developed and well-nourished  HENT:   Head: Normocephalic and atraumatic  Nose: Nose normal    Mouth/Throat: Oropharynx is clear and moist  No oropharyngeal exudate  Eyes: Conjunctivae and EOM are normal  Right eye exhibits no discharge  Left eye exhibits no discharge  No scleral icterus  Neck: Normal range of motion  Neck supple  No tracheal deviation present  No thyromegaly present  Cardiovascular: Normal rate, regular rhythm and normal heart sounds  No murmur heard  Pulmonary/Chest: Effort normal and breath sounds normal  No respiratory distress  She has no wheezes  She has no rales  Abdominal: Soft   Bowel sounds are normal  She exhibits no distension and no mass  There is no tenderness  There is no rebound  Musculoskeletal: Normal range of motion  She exhibits no edema  Lymphadenopathy:     She has no cervical adenopathy  Neurological: She is alert and oriented to person, place, and time  She has normal reflexes  No cranial nerve deficit  Skin: Skin is warm  No rash noted  No erythema  No pallor  Psychiatric: She has a normal mood and affect  Her behavior is normal  Judgment and thought content normal    Nursing note and vitals reviewed  Past Surgical History:   Procedure Laterality Date    [place] ESOPHAGOGASTRODUODENOSCOPY TRANSORAL DIAGNOSTIC N/A 10/24/2018    Procedure: ESOPHAGOGASTRODUODENOSCOPY (EGD); Surgeon: Aimee Kennedy MD;  [facility];   Service: Gastroenterology    WISDOM TOOTH EXTRACTION         Family History   Problem Relation Age of Onset    Depression Mother          Current Outpatient Prescriptions:     buPROPion (WELLBUTRIN XL) 150 mg 24 hr tablet, Take 300 mg by mouth  , Disp: , Rfl:     drospirenone-ethinyl estradiol (OCELLA) 3-0 03 MG per tablet, Take 1 tablet by mouth daily, Disp: , Rfl:     ranitidine (ZANTAC) 150 MG capsule, Take 1 capsule (150 mg total) by mouth 2 (two) times a day, Disp: 60 capsule, Rfl: 3    sucralfate (CARAFATE) 1 g/10 mL suspension, Take 10 mL (1 g total) by mouth 4 (four) times a day, Disp: 420 mL, Rfl: 0    Allergies   Allergen Reactions    Amoxicillin GI Intolerance       Vitals:    10/29/18 1025   BP: 100/60   Pulse: 81   Resp: 16   SpO2: 98%   Weight: 56 7 kg (125 lb)   Height: 4' 11" (1 499 m)

## 2018-10-29 NOTE — ASSESSMENT & PLAN NOTE
On recent CT scan of her abdomen for abdominal pain it was found she has 3 mm left renal stone, advised to just monitor her fluid intake, she is currently asymptomatic

## 2018-10-29 NOTE — ASSESSMENT & PLAN NOTE
She had recent endoscopy, she has mild gastritis and linear ulcer, GI started her on sucralfate and Zantac and she feels improvement and she will follow up with them, she has improvement on her symptoms

## 2018-10-31 ENCOUNTER — TELEPHONE (OUTPATIENT)
Dept: GASTROENTEROLOGY | Facility: AMBULARY SURGERY CENTER | Age: 33
End: 2018-10-31

## 2018-10-31 NOTE — TELEPHONE ENCOUNTER
----- Message from Gumaro Aguirre MD sent at 10/30/2018  9:46 AM EDT -----  Please inform the patient biopsies from small intestine are negative for celiac sprue, biopsies from stomach were negative for H pylori  Please find out if her symptoms have improved with Carafate therapy  If not we will start her on PPI therapy  Please have her follow up in the office with PA in 2 months, call with any questions or concerns or worsening symptoms

## 2018-11-01 NOTE — TELEPHONE ENCOUNTER
Ayde Gandara spoke with patient and gave results  Pt stated she will call back to schedule her 2 month PA follow up

## 2018-11-26 ENCOUNTER — OFFICE VISIT (OUTPATIENT)
Dept: FAMILY MEDICINE CLINIC | Facility: CLINIC | Age: 33
End: 2018-11-26
Payer: COMMERCIAL

## 2018-11-26 VITALS
RESPIRATION RATE: 16 BRPM | OXYGEN SATURATION: 97 % | DIASTOLIC BLOOD PRESSURE: 70 MMHG | WEIGHT: 122 LBS | HEIGHT: 59 IN | BODY MASS INDEX: 24.6 KG/M2 | SYSTOLIC BLOOD PRESSURE: 102 MMHG | HEART RATE: 93 BPM

## 2018-11-26 DIAGNOSIS — K21.9 CHRONIC GERD: Primary | ICD-10-CM

## 2018-11-26 PROBLEM — R10.12 LEFT UPPER QUADRANT PAIN: Status: RESOLVED | Noted: 2018-10-11 | Resolved: 2018-11-26

## 2018-11-26 PROBLEM — R19.8 CHANGE IN BOWEL MOVEMENT: Status: RESOLVED | Noted: 2018-07-16 | Resolved: 2018-11-26

## 2018-11-26 PROBLEM — R10.13 EPIGASTRIC PAIN: Status: RESOLVED | Noted: 2018-10-08 | Resolved: 2018-11-26

## 2018-11-26 PROBLEM — R10.30 LOWER ABDOMINAL PAIN: Status: RESOLVED | Noted: 2018-09-10 | Resolved: 2018-11-26

## 2018-11-26 PROCEDURE — 99213 OFFICE O/P EST LOW 20 MIN: CPT | Performed by: FAMILY MEDICINE

## 2018-11-26 NOTE — ASSESSMENT & PLAN NOTE
She is now stable on Zantac 300 mg daily, endoscopy was negative  She will see the gastroenterologist in February 2019, she will continue same medication she says she stop taking sucralfate now    She says she had thyroid checked which was normal

## 2018-11-26 NOTE — PROGRESS NOTES
Assessment/Plan:    Problem List Items Addressed This Visit        Digestive    Chronic GERD - Primary     She is now stable on Zantac 300 mg daily, endoscopy was negative  She will see the gastroenterologist in February 2019, she will continue same medication she says she stop taking sucralfate now  She says she had thyroid checked which was normal               Chief Complaint   Patient presents with    Follow-up       Subjective:   Patient ID: Marilin Alvarez is a 34 y o  female  She is here follow-up and she says her acid reflux is under control now she takes Zantac 150 mg twice a day and she finished the sucralfate  She says she has no more abdominal pain acid reflux vomiting but she sometimes feel nausea, she had her endoscopy and biopsies were negative,  She will follow up with gastroenterologist now in February 2019  She follows psychiatrist and the anxiety and depression is under control with the Wellbutrin 300 mg daily  And she is also on birth control pills  Review of Systems   Constitutional: Negative for activity change, appetite change, chills, diaphoresis, fatigue, fever and unexpected weight change  HENT: Negative for congestion, dental problem, ear discharge, ear pain, facial swelling, hearing loss, mouth sores, nosebleeds, postnasal drip, rhinorrhea, sinus pain, sinus pressure, sneezing, sore throat, trouble swallowing and voice change  Eyes: Negative for photophobia, pain, discharge, redness and itching  Respiratory: Negative for cough, chest tightness, shortness of breath and wheezing  Cardiovascular: Negative for chest pain, palpitations and leg swelling  Gastrointestinal: Negative for abdominal distention, abdominal pain, blood in stool, constipation, diarrhea and nausea  Endocrine: Negative for cold intolerance, heat intolerance, polydipsia, polyphagia and polyuria  Genitourinary: Negative for dysuria, flank pain, frequency, hematuria and urgency  Musculoskeletal: Negative for arthralgias, back pain, myalgias and neck pain  Skin: Negative for color change and pallor  Allergic/Immunologic: Negative for environmental allergies and food allergies  Neurological: Negative for dizziness, weakness, light-headedness, numbness and headaches  Hematological: Negative for adenopathy  Does not bruise/bleed easily  Psychiatric/Behavioral: Negative for behavioral problems, sleep disturbance and suicidal ideas  The patient is not nervous/anxious  Objective:  Physical Exam   Constitutional: She appears well-developed and well-nourished  Feels warm   HENT:   Head: Normocephalic and atraumatic  Mouth/Throat: Oropharynx is clear and moist    Eyes: Conjunctivae and EOM are normal  No scleral icterus  Neck: Neck supple  No thyromegaly present  Cardiovascular: Normal rate, regular rhythm and normal heart sounds  Pulmonary/Chest: Effort normal and breath sounds normal  She has no wheezes  She has no rales  Lymphadenopathy:     She has no cervical adenopathy  Neurological: She is alert  Skin: No rash noted  No erythema  Psychiatric: She has a normal mood and affect  Nursing note and vitals reviewed  Past Surgical History:   Procedure Laterality Date    [place] ESOPHAGOGASTRODUODENOSCOPY TRANSORAL DIAGNOSTIC N/A 10/24/2018    Procedure: ESOPHAGOGASTRODUODENOSCOPY (EGD); Surgeon: Venu Danielson MD;  [facility];   Service: Gastroenterology    WISDOM TOOTH EXTRACTION         Family History   Problem Relation Age of Onset    Depression Mother          Current Outpatient Prescriptions:     buPROPion (WELLBUTRIN XL) 150 mg 24 hr tablet, Take 300 mg by mouth  , Disp: , Rfl:     drospirenone-ethinyl estradiol (OCELLA) 3-0 03 MG per tablet, Take 1 tablet by mouth daily, Disp: , Rfl:     ranitidine (ZANTAC) 150 MG capsule, Take 1 capsule (150 mg total) by mouth 2 (two) times a day, Disp: 60 capsule, Rfl: 3    sucralfate (CARAFATE) 1 g/10 mL suspension, Take 10 mL (1 g total) by mouth 4 (four) times a day, Disp: 420 mL, Rfl: 0    Allergies   Allergen Reactions    Amoxicillin GI Intolerance       Vitals:    11/26/18 0959   BP: 102/70   Pulse: 93   Resp: 16   SpO2: 97%   Weight: 55 3 kg (122 lb)   Height: 4' 11" (1 499 m)

## 2019-01-21 ENCOUNTER — OFFICE VISIT (OUTPATIENT)
Dept: GASTROENTEROLOGY | Facility: AMBULARY SURGERY CENTER | Age: 34
End: 2019-01-21
Payer: COMMERCIAL

## 2019-01-21 VITALS
TEMPERATURE: 98.6 F | SYSTOLIC BLOOD PRESSURE: 100 MMHG | DIASTOLIC BLOOD PRESSURE: 62 MMHG | WEIGHT: 124.6 LBS | BODY MASS INDEX: 25.12 KG/M2 | RESPIRATION RATE: 14 BRPM | HEART RATE: 96 BPM | HEIGHT: 59 IN

## 2019-01-21 DIAGNOSIS — R11.0 NAUSEA: Primary | ICD-10-CM

## 2019-01-21 DIAGNOSIS — K21.9 CHRONIC GERD: ICD-10-CM

## 2019-01-21 PROCEDURE — 99214 OFFICE O/P EST MOD 30 MIN: CPT | Performed by: PHYSICIAN ASSISTANT

## 2019-01-21 RX ORDER — CITALOPRAM 10 MG/1
10 TABLET ORAL DAILY
COMMUNITY
End: 2020-04-06

## 2019-01-21 NOTE — LETTER
January 21, 2019     Elsa Ayala MD  [address] [facility]  [place] [address]    Patient: Dawood Vee   YOB: 1985   Date of Visit: 1/21/2019       Dear Dr Osmani Gaston:    Thank you for referring Lay Bell to me for evaluation  Below are my notes for this consultation  If you have questions, please do not hesitate to call me  I look forward to following your patient along with you  Sincerely,        Corbin Fletcher PA-C        CC: No Recipients  Corbin Fletcher PA-C  1/21/2019 12:45 PM  Sign at close encounter  [facility] - Outpatient Follow-up Note  Dawood Vee 34 y o  female MRN: [id number]  Encounter: [id number]      ASSESSMENT AND PLAN:      Epigastric pain  Dyspepsia  -EGD with gastric erosions negative for hpylori  -she had been on carafate and ranitidine for 3 months, more recently just ranitidine  -she is asymptomatic at this time  -she will trial tapering the ranitidine to once daily then complete therapy  If she has recurrence of symptoms I encouraged her to call and she should resume ranitidine  -reviewed non-ulcerogenic diet  ______________________________________________________________________    SUBJECTIVEMelda Mutters presents for follow up  She was seen previously by Dr Johnny Riley for epigastric pain and had an EGD with gastric erosions, pathology was negative for hpylori, no celiac disease  She was treated with ranitidine BID and carafate  She is currently only taking raniditine  She denies any further symptoms except sometimes she gets mild nausea when she is hungry  She does not take NSAIDs      REVIEW OF SYSTEMS IS OTHERWISE NEGATIVE        Historical Information   Past Medical History:   Diagnosis Date    Abnormal weight gain 03/23/2006    Acne 03/23/2006    Depression     Dysfunction of both eustachian tubes     last assessed 12/21/17    History of PCOS     Hyperlipidemia     Polycystic ovarian syndrome 01/30/2009    Severe single current episode of major depressive disorder, without psychotic features [facility]     last assessed 06/21/16    Suicidal ideation     last assessed 10/24/17     Past Surgical History:   Procedure Laterality Date    [place] ESOPHAGOGASTRODUODENOSCOPY TRANSORAL DIAGNOSTIC N/A 10/24/2018    Procedure: ESOPHAGOGASTRODUODENOSCOPY (EGD); Surgeon: Ranjan Fields MD;  [facility]; Service: Gastroenterology    WISDOM TOOTH EXTRACTION       Social History   History   Alcohol Use    1 2 oz/week    2 Glasses of wine per week     Comment: socially(2 glasses weekly)     History   Drug Use No     History   Smoking Status    Never Smoker   Smokeless Tobacco    Never Used     Family History   Problem Relation Age of Onset    Depression Mother        Meds/Allergies       Current Outpatient Prescriptions:     buPROPion (WELLBUTRIN XL) 150 mg 24 hr tablet    citalopram (CeleXA) 10 mg tablet    drospirenone-ethinyl estradiol (OCELLA) 3-0 03 MG per tablet    ranitidine (ZANTAC) 150 MG capsule    sucralfate (CARAFATE) 1 g/10 mL suspension    Allergies   Allergen Reactions    Amoxicillin GI Intolerance           Objective     Blood pressure 100/62, pulse 96, temperature 98 6 °F (37 °C), temperature source Tympanic, resp  rate 14, height 4' 11" (1 499 m), weight 56 5 kg (124 lb 9 6 oz)  Body mass index is 25 17 kg/m²  PHYSICAL EXAM:      General Appearance:   Alert, cooperative, no distress   HEENT:   Normocephalic, atraumatic, anicteric      Neck:  Supple, symmetrical, trachea midline   Lungs:   Clear to auscultation bilaterally   Heart[de-identified]   Regular rate and rhythm   Abdomen:   Soft, non-tender, non-distended; normal bowel sounds                             Lab Results:   No visits with results within 1 Day(s) from this visit     Latest known visit with results is:   Admission on 10/24/2018, Discharged on 10/24/2018   Component Date Value    EXT Preg Test, Ur 10/24/2018 Negative     Case Report 10/24/2018                      Value:Surgical Pathology Report                         Case: [id number]                                   Authorizing Provider:  Estefania Cook MD            Collected:           10/24/2018 1108              [facility]        Received:            10/24/2018 [address] [facility]                                                      Pathologist:           Sherri Chau MD                                                         Specimens:   A) - Duodenum, Bx duodenum                                                                          B) - Stomach, Bx Gastric                                                                   Final Diagnosis 10/24/2018                      Value: This result contains rich text formatting which cannot be displayed here   Additional Information 10/24/2018                      Value: This result contains rich text formatting which cannot be displayed here  Exie Courtland Gross Description 10/24/2018                      Value: This result contains rich text formatting which cannot be displayed here   Clinical Information 10/24/2018                      Value:R/o celiac         Radiology Results:   No results found

## 2019-01-21 NOTE — PROGRESS NOTES
[facility] - Outpatient Follow-up Note  Robert Goldman 34 y o  female MRN: [id number]  Encounter: [id number]      ASSESSMENT AND PLAN:      Epigastric pain  Dyspepsia  -EGD with gastric erosions negative for hpylori  -she had been on carafate and ranitidine for 3 months, more recently just ranitidine  -she is asymptomatic at this time  -she will trial tapering the ranitidine to once daily then complete therapy  If she has recurrence of symptoms I encouraged her to call and she should resume ranitidine  -reviewed non-ulcerogenic diet  ______________________________________________________________________    SUBJECTIVECurlene Pel presents for follow up  She was seen previously by Dr Dave Aguero for epigastric pain and had an EGD with gastric erosions, pathology was negative for hpylori, no celiac disease  She was treated with ranitidine BID and carafate  She is currently only taking raniditine  She denies any further symptoms except sometimes she gets mild nausea when she is hungry  She does not take NSAIDs      REVIEW OF SYSTEMS IS OTHERWISE NEGATIVE  Historical Information   Past Medical History:   Diagnosis Date    Abnormal weight gain 03/23/2006    Acne 03/23/2006    Depression     Dysfunction of both eustachian tubes     last assessed 12/21/17    History of PCOS     Hyperlipidemia     Polycystic ovarian syndrome 01/30/2009    Severe single current [facility] major [facility]     last assessed 06/21/16    Suicidal ideation     last assessed 10/24/17     Past Surgical History:   Procedure Laterality Date    UT ESOPHAGOGASTRODUODENOSCOPY TRANSORAL DIAGNOSTIC N/A 10/24/2018    Procedure: ESOPHAGOGASTRODUODENOSCOPY (EGD); Surgeon: Obed Shrestha MD;  [facility];   Service: Gastroenterology    WISDOM TOOTH EXTRACTION       Social History   History   Alcohol Use    1 2 oz/week    2 Glasses of wine per week     Comment: socially(2 glasses weekly)     History   Drug Use No     History   Smoking Status    Never Smoker   Smokeless Tobacco    Never Used     Family History   Problem Relation Age of Onset    Depression Mother        Meds/Allergies       Current Outpatient Prescriptions:     buPROPion (WELLBUTRIN XL) 150 mg 24 hr tablet    citalopram (CeleXA) 10 mg tablet    drospirenone-ethinyl estradiol (OCELLA) 3-0 03 MG per tablet    ranitidine (ZANTAC) 150 MG capsule    sucralfate (CARAFATE) 1 g/10 mL suspension    Allergies   Allergen Reactions    Amoxicillin GI Intolerance           Objective     Blood pressure 100/62, pulse 96, temperature 98 6 °F (37 °C), temperature source Tympanic, resp  rate 14, height 4' 11" (1 499 m), weight 56 5 kg (124 lb 9 6 oz)  Body mass index is 25 17 kg/m²  PHYSICAL EXAM:      General Appearance:   Alert, cooperative, no distress   HEENT:   Normocephalic, atraumatic, anicteric      Neck:  Supple, symmetrical, trachea midline   Lungs:   Clear to auscultation bilaterally   Heart[de-identified]   Regular rate and rhythm   Abdomen:   Soft, non-tender, non-distended; normal bowel sounds                             Lab Results:   No visits with results within 1 Day(s) from this visit     Latest known visit with results is:   Admission on 10/24/2018, Discharged on 10/24/2018   Component Date Value    EXT Preg Test, Ur 10/24/2018 Negative     Case Report 10/24/2018                      Value:Surgical Pathology Report                         Case: [id number]                                   Authorizing Provider:  Hiral De La Torre MD            Collected:           10/24/2018 1108              Ordering Location:     [facility]        Received:            10/24/2018 [address] [facility]                                                      Pathologist: Paulino Beverly MD                                                         Specimens:   A) - Duodenum, Bx duodenum                                                                          B) - Stomach, Bx Gastric                                                                   Final Diagnosis 10/24/2018                      Value: This result contains rich text formatting which cannot be displayed here   Additional Information 10/24/2018                      Value: This result contains rich text formatting which cannot be displayed here  [employer] Description 10/24/2018                      Value: This result contains rich text formatting which cannot be displayed here   Clinical Information 10/24/2018                      Value:R/o celiac         Radiology Results:   No results found

## 2019-03-06 ENCOUNTER — OFFICE VISIT (OUTPATIENT)
Dept: FAMILY MEDICINE CLINIC | Facility: CLINIC | Age: 34
End: 2019-03-06
Payer: COMMERCIAL

## 2019-03-06 VITALS
HEIGHT: 59 IN | BODY MASS INDEX: 24.64 KG/M2 | SYSTOLIC BLOOD PRESSURE: 106 MMHG | TEMPERATURE: 98.2 F | HEART RATE: 92 BPM | WEIGHT: 122.2 LBS | OXYGEN SATURATION: 98 % | DIASTOLIC BLOOD PRESSURE: 74 MMHG | RESPIRATION RATE: 16 BRPM

## 2019-03-06 DIAGNOSIS — J06.9 ACUTE UPPER RESPIRATORY INFECTION: Primary | ICD-10-CM

## 2019-03-06 PROCEDURE — 99213 OFFICE O/P EST LOW 20 MIN: CPT | Performed by: NURSE PRACTITIONER

## 2019-03-06 RX ORDER — MULTIVITAMIN
1 CAPSULE ORAL DAILY
COMMUNITY
End: 2020-04-06

## 2019-03-06 NOTE — PROGRESS NOTES
Assessment/Plan:      Diagnoses and all orders for this visit:    Acute upper respiratory infection  Discussed with the patient that her symptoms are most likely caused by a viral URI  Patient instructed to drink plenty of fluids  Patient instructed to follow-up if symptoms get worse or do not get better  Subjective:     Patient ID: Kelton Capps is a 34 y o  female  Patient reports a dry cough for the past 4 days  Denies any fever, wheezing, or SOB  Patient also reports nasal congestion and sore throat  Denies any earache, vomiting, or diarrhea  Patient reports that she took cold medication OTC  Patient reports that her symptoms are not going away  Review of Systems   Constitutional: Negative for chills, fatigue and fever  HENT:        As noted in HPI  Eyes: Negative for pain, discharge and redness  Respiratory:        As noted in HPI  Cardiovascular: Negative for chest pain and palpitations  Gastrointestinal: Negative for abdominal pain, diarrhea, nausea and vomiting  Genitourinary: Negative for dysuria, frequency, hematuria and urgency  Musculoskeletal: Negative for myalgias  Skin: Negative for rash  Neurological: Negative for dizziness, seizures, syncope, light-headedness and headaches  Objective:     Physical Exam   Constitutional: She is oriented to person, place, and time  No distress  HENT:   Right Ear: External ear normal    Left Ear: External ear normal    Mouth/Throat: Oropharynx is clear and moist    Tympanic membranes and ear canals wnl  Clear nasal discharge noted  Eyes: Pupils are equal, round, and reactive to light  Conjunctivae are normal  Right eye exhibits no discharge  Left eye exhibits no discharge  Cardiovascular: Normal rate, regular rhythm and normal heart sounds  Pulmonary/Chest: Effort normal and breath sounds normal  No respiratory distress  She has no wheezes  Musculoskeletal:   Gait wnl      Lymphadenopathy:     She has no cervical adenopathy  Neurological: She is alert and oriented to person, place, and time  Skin: No rash noted  Psychiatric: She has a normal mood and affect  Vitals reviewed

## 2019-07-25 ENCOUNTER — OFFICE VISIT (OUTPATIENT)
Dept: FAMILY MEDICINE CLINIC | Facility: CLINIC | Age: 34
End: 2019-07-25
Payer: COMMERCIAL

## 2019-07-25 VITALS
OXYGEN SATURATION: 98 % | HEART RATE: 84 BPM | BODY MASS INDEX: 24.19 KG/M2 | SYSTOLIC BLOOD PRESSURE: 100 MMHG | RESPIRATION RATE: 16 BRPM | WEIGHT: 120 LBS | DIASTOLIC BLOOD PRESSURE: 72 MMHG | TEMPERATURE: 97 F | HEIGHT: 59 IN

## 2019-07-25 DIAGNOSIS — J01.10 ACUTE NON-RECURRENT FRONTAL SINUSITIS: Primary | ICD-10-CM

## 2019-07-25 PROCEDURE — 1036F TOBACCO NON-USER: CPT | Performed by: FAMILY MEDICINE

## 2019-07-25 PROCEDURE — 99214 OFFICE O/P EST MOD 30 MIN: CPT | Performed by: FAMILY MEDICINE

## 2019-07-25 PROCEDURE — 3008F BODY MASS INDEX DOCD: CPT | Performed by: FAMILY MEDICINE

## 2019-07-25 RX ORDER — AZITHROMYCIN 250 MG/1
TABLET, FILM COATED ORAL
Qty: 6 TABLET | Refills: 0 | Status: SHIPPED | OUTPATIENT
Start: 2019-07-25 | End: 2019-07-29

## 2019-07-25 RX ORDER — FLUTICASONE PROPIONATE 50 MCG
1 SPRAY, SUSPENSION (ML) NASAL DAILY
Qty: 1 BOTTLE | Refills: 0 | Status: SHIPPED | OUTPATIENT
Start: 2019-07-25 | End: 2020-04-06

## 2019-07-25 NOTE — PROGRESS NOTES
Subjective:      Patient ID: Yves Zuniga is a 30 y o  female  Sore Throat    This is a new problem  The current episode started in the past 7 days  The problem has been unchanged  There has been no fever  The pain is at a severity of 5/10  The pain is moderate  Associated symptoms include congestion, coughing, headaches and a hoarse voice  Pertinent negatives include no shortness of breath  She has had no exposure to strep or mono  She has tried nothing for the symptoms  The treatment provided no relief  Past Medical History:   Diagnosis Date    Abnormal weight gain 03/23/2006    Acne 03/23/2006    Depression     Dysfunction of both eustachian tubes     last assessed 12/21/17    History of PCOS     Hyperlipidemia     Polycystic ovarian syndrome 01/30/2009    Severe single current episode of major depressive disorder, without psychotic features ([facility]     last assessed 06/21/16    Suicidal ideation     last assessed 10/24/17       Family History   Problem Relation Age of Onset    Depression Mother        Past Surgical History:   Procedure Laterality Date    AR ESOPHAGOGASTRODUODENOSCOPY TRANSORAL DIAGNOSTIC N/A 10/24/2018    Procedure: ESOPHAGOGASTRODUODENOSCOPY (EGD); Surgeon: Amado Preston MD;  [facility]; Service: Gastroenterology    WISDOM TOOTH EXTRACTION          reports that she has never smoked  She has never used smokeless tobacco  She reports that she drinks alcohol  She reports that she does not use drugs        Current Outpatient Medications:     buPROPion (WELLBUTRIN XL) 150 mg 24 hr tablet, Take 300 mg by mouth  , Disp: , Rfl:     citalopram (CeleXA) 10 mg tablet, Take 10 mg by mouth daily , Disp: , Rfl:     drospirenone-ethinyl estradiol (OCELLA) 3-0 03 MG per tablet, Take 1 tablet by mouth daily, Disp: , Rfl:     Multiple Vitamin (MULTIVITAMIN) capsule, Take 1 capsule by mouth daily, Disp: , Rfl:     ranitidine (ZANTAC) 150 MG capsule, Take 1 capsule (150 mg total) by mouth 2 (two) times a day, Disp: 60 capsule, Rfl: 3    azithromycin (ZITHROMAX) 250 mg tablet, Take 2 tablets today then 1 tablet daily x 4 days, Disp: 6 tablet, Rfl: 0    fluticasone (FLONASE) 50 mcg/act nasal spray, 1 spray into each nostril daily, Disp: 1 Bottle, Rfl: 0    The following portions of the patient's history were reviewed and updated as appropriate: allergies, current medications, past family history, past medical history, past social history, past surgical history and problem list     Review of Systems   Constitutional: Negative  HENT: Positive for congestion, hoarse voice, sinus pressure, sinus pain, sneezing and sore throat  Negative for postnasal drip  Respiratory: Positive for cough  Negative for chest tightness and shortness of breath  Cardiovascular: Negative  Neurological: Positive for headaches  Objective:    /72   Pulse 84   Temp (!) 97 °F (36 1 °C)   Resp 16   Ht 4' 11" (1 499 m)   Wt 54 4 kg (120 lb)   SpO2 98%   BMI 24 24 kg/m²      Physical Exam   Constitutional: She appears well-developed and well-nourished  HENT:   Right Ear: External ear normal    Left Ear: External ear normal    Mouth/Throat: Oropharynx is clear and moist    Posterior pharyngeal wall erythema   Cardiovascular: Normal rate, regular rhythm and normal heart sounds  Pulmonary/Chest: Effort normal and breath sounds normal          No results found for this or any previous visit (from the past 1008 hour(s))  Assessment/Plan:         Problem List Items Addressed This Visit        Respiratory    Acute non-recurrent frontal sinusitis - Primary     Patient advised supportive care for viral URI symptoms  Will start the antibiotics if symptoms do not improve           Relevant Medications    fluticasone (FLONASE) 50 mcg/act nasal spray    azithromycin (ZITHROMAX) 250 mg tablet

## 2019-07-25 NOTE — ASSESSMENT & PLAN NOTE
Patient advised supportive care for viral URI symptoms  Will start the antibiotics if symptoms do not improve

## 2019-10-21 ENCOUNTER — OFFICE VISIT (OUTPATIENT)
Dept: FAMILY MEDICINE CLINIC | Facility: CLINIC | Age: 34
End: 2019-10-21
Payer: COMMERCIAL

## 2019-10-21 VITALS
HEART RATE: 89 BPM | SYSTOLIC BLOOD PRESSURE: 104 MMHG | HEIGHT: 59 IN | DIASTOLIC BLOOD PRESSURE: 76 MMHG | TEMPERATURE: 98.7 F | OXYGEN SATURATION: 98 % | BODY MASS INDEX: 24.8 KG/M2 | RESPIRATION RATE: 12 BRPM | WEIGHT: 123 LBS

## 2019-10-21 DIAGNOSIS — M54.50 BILATERAL LOW BACK PAIN WITHOUT SCIATICA, UNSPECIFIED CHRONICITY: Primary | ICD-10-CM

## 2019-10-21 DIAGNOSIS — N39.0 URINARY TRACT INFECTION WITHOUT HEMATURIA, SITE UNSPECIFIED: ICD-10-CM

## 2019-10-21 DIAGNOSIS — R82.81 PYURIA: ICD-10-CM

## 2019-10-21 PROBLEM — J06.9 ACUTE UPPER RESPIRATORY INFECTION: Status: RESOLVED | Noted: 2019-03-06 | Resolved: 2019-10-21

## 2019-10-21 LAB
SL AMB  POCT GLUCOSE, UA: NORMAL
SL AMB LEUKOCYTE ESTERASE,UA: 75
SL AMB POCT BILIRUBIN,UA: NORMAL
SL AMB POCT BLOOD,UA: NORMAL
SL AMB POCT CLARITY,UA: CLEAR
SL AMB POCT COLOR,UA: YELLOW
SL AMB POCT KETONES,UA: NORMAL
SL AMB POCT NITRITE,UA: NORMAL
SL AMB POCT PH,UA: 7
SL AMB POCT SPECIFIC GRAVITY,UA: 1.01
SL AMB POCT URINE PROTEIN: NORMAL
SL AMB POCT UROBILINOGEN: NORMAL

## 2019-10-21 PROCEDURE — 3008F BODY MASS INDEX DOCD: CPT | Performed by: FAMILY MEDICINE

## 2019-10-21 PROCEDURE — 81003 URINALYSIS AUTO W/O SCOPE: CPT | Performed by: FAMILY MEDICINE

## 2019-10-21 PROCEDURE — 99213 OFFICE O/P EST LOW 20 MIN: CPT | Performed by: FAMILY MEDICINE

## 2019-10-21 RX ORDER — CIPROFLOXACIN 500 MG/1
500 TABLET, FILM COATED ORAL EVERY 12 HOURS SCHEDULED
Qty: 14 TABLET | Refills: 0 | Status: SHIPPED | OUTPATIENT
Start: 2019-10-21 | End: 2019-10-28

## 2019-10-21 NOTE — PROGRESS NOTES
Assessment/Plan:    Problem List Items Addressed This Visit        Genitourinary    Urinary tract infection without hematuria    Relevant Medications    ciprofloxacin (CIPRO) 500 mg tablet       Other    Bilateral low back pain without sciatica - Primary    Relevant Orders    POCT urine dip (Completed)    Pyuria    Relevant Medications    ciprofloxacin (CIPRO) 500 mg tablet    Other Relevant Orders    Chlamydia/GC amplified DNA by PCR          Chief Complaint   Patient presents with    Urinary Tract Infection       Subjective:   Patient ID: Cong Ramirez is a 30 y o  female  She complains of having burning in the urine started 1 week ago and now she has more back pain and pain goes in the pelvis, denies any anginal discharge, she sexually active,  She just finished her periods  Denies nausea vomiting fever or chills ,her urine color is yellow ,      Review of Systems   Constitutional: Negative for activity change, appetite change, chills, diaphoresis, fatigue, fever and unexpected weight change  HENT: Negative for congestion, dental problem, ear discharge, ear pain, facial swelling, hearing loss, mouth sores, nosebleeds, postnasal drip, rhinorrhea, sinus pressure, sinus pain, sneezing, sore throat, trouble swallowing and voice change  Eyes: Negative for photophobia, pain, discharge, redness and itching  Respiratory: Negative for cough, chest tightness, shortness of breath and wheezing  Cardiovascular: Negative for chest pain, palpitations and leg swelling  Gastrointestinal: Negative for abdominal distention, abdominal pain, blood in stool, constipation, diarrhea and nausea  Endocrine: Negative for cold intolerance, heat intolerance, polydipsia, polyphagia and polyuria  Genitourinary: Positive for dysuria and pelvic pain  Negative for flank pain, frequency, hematuria and urgency  Musculoskeletal: Negative for arthralgias, back pain, myalgias and neck pain     Skin: Negative for color change and pallor  Allergic/Immunologic: Negative for environmental allergies and food allergies  Neurological: Negative for dizziness, weakness, light-headedness, numbness and headaches  Hematological: Negative for adenopathy  Does not bruise/bleed easily  Psychiatric/Behavioral: Negative for behavioral problems, sleep disturbance and suicidal ideas  The patient is not nervous/anxious  Objective:  Physical Exam   Constitutional: She appears well-developed and well-nourished  HENT:   Head: Normocephalic and atraumatic  Left Ear: External ear normal    Mouth/Throat: Oropharynx is clear and moist    Eyes: No scleral icterus  Neck: Normal range of motion  Neck supple  No thyromegaly present  Cardiovascular: Normal rate, regular rhythm and normal heart sounds  Pulmonary/Chest: Effort normal and breath sounds normal  She has no wheezes  She has no rales  Musculoskeletal:   Tenderness in the both CVA   Lymphadenopathy:     She has no cervical adenopathy  Neurological: She is alert  Skin: No rash noted  No erythema  Psychiatric: She has a normal mood and affect  Nursing note and vitals reviewed  Past Surgical History:   Procedure Laterality Date    AL ESOPHAGOGASTRODUODENOSCOPY TRANSORAL DIAGNOSTIC N/A 10/24/2018    Procedure: ESOPHAGOGASTRODUODENOSCOPY (EGD); Surgeon: Sarmad Mauricio MD;  [facility];   Service: Gastroenterology    WISDOM TOOTH EXTRACTION         Family History   Problem Relation Age of Onset    Depression Mother          Current Outpatient Medications:     buPROPion (WELLBUTRIN XL) 150 mg 24 hr tablet, Take 300 mg by mouth  , Disp: , Rfl:     citalopram (CeleXA) 10 mg tablet, Take 10 mg by mouth daily , Disp: , Rfl:     drospirenone-ethinyl estradiol (OCELLA) 3-0 03 MG per tablet, Take 1 tablet by mouth daily, Disp: , Rfl:     fluticasone (FLONASE) 50 mcg/act nasal spray, 1 spray into each nostril daily, Disp: 1 Bottle, Rfl: 0    Multiple Vitamin (MULTIVITAMIN) capsule, Take 1 capsule by mouth daily, Disp: , Rfl:     ranitidine (ZANTAC) 150 MG capsule, Take 1 capsule (150 mg total) by mouth 2 (two) times a day, Disp: 60 capsule, Rfl: 3    ciprofloxacin (CIPRO) 500 mg tablet, Take 1 tablet (500 mg total) by mouth every 12 (twelve) hours for 7 days, Disp: 14 tablet, Rfl: 0    Allergies   Allergen Reactions    Amoxicillin GI Intolerance       Vitals:    10/21/19 1641   BP: 104/76   BP Location: Left arm   Patient Position: Sitting   Cuff Size: Large   Pulse: 89   Resp: 12   Temp: 98 7 °F (37 1 °C)   SpO2: 98%   Weight: 55 8 kg (123 lb)   Height: 4' 11" (1 499 m)

## 2019-10-24 LAB
EXTERNAL CHLAMYDIA RESULT: NOT DETECTED
N GONORRHOEA RRNA SPEC QL PROBE: NOT DETECTED

## 2020-04-06 ENCOUNTER — TELEMEDICINE (OUTPATIENT)
Dept: FAMILY MEDICINE CLINIC | Facility: CLINIC | Age: 35
End: 2020-04-06
Payer: COMMERCIAL

## 2020-04-06 DIAGNOSIS — R35.0 URINARY FREQUENCY: ICD-10-CM

## 2020-04-06 DIAGNOSIS — N39.0 URINARY TRACT INFECTION WITHOUT HEMATURIA, SITE UNSPECIFIED: Primary | ICD-10-CM

## 2020-04-06 PROCEDURE — 99213 OFFICE O/P EST LOW 20 MIN: CPT | Performed by: NURSE PRACTITIONER

## 2020-04-06 RX ORDER — CITALOPRAM 20 MG/1
20 TABLET ORAL
COMMUNITY
Start: 2020-03-30

## 2020-04-06 RX ORDER — CEPHALEXIN 500 MG/1
500 CAPSULE ORAL EVERY 12 HOURS SCHEDULED
Qty: 14 CAPSULE | Refills: 0 | Status: SHIPPED | OUTPATIENT
Start: 2020-04-06 | End: 2020-04-13

## 2020-10-26 ENCOUNTER — OFFICE VISIT (OUTPATIENT)
Dept: FAMILY MEDICINE CLINIC | Facility: CLINIC | Age: 35
End: 2020-10-26

## 2020-10-26 VITALS
HEART RATE: 99 BPM | HEIGHT: 59 IN | WEIGHT: 138 LBS | BODY MASS INDEX: 27.82 KG/M2 | DIASTOLIC BLOOD PRESSURE: 70 MMHG | SYSTOLIC BLOOD PRESSURE: 112 MMHG | OXYGEN SATURATION: 98 % | TEMPERATURE: 97 F | RESPIRATION RATE: 16 BRPM

## 2020-10-26 DIAGNOSIS — L70.9 ACNE, UNSPECIFIED ACNE TYPE: ICD-10-CM

## 2020-10-26 DIAGNOSIS — N92.6 MENSTRUAL ABNORMALITY: Primary | ICD-10-CM

## 2020-10-26 PROBLEM — J01.10 ACUTE NON-RECURRENT FRONTAL SINUSITIS: Status: RESOLVED | Noted: 2019-07-25 | Resolved: 2020-10-26

## 2020-10-26 PROBLEM — R82.81 PYURIA: Status: RESOLVED | Noted: 2019-10-21 | Resolved: 2020-10-26

## 2020-10-26 PROBLEM — R11.0 NAUSEA: Status: RESOLVED | Noted: 2018-10-08 | Resolved: 2020-10-26

## 2020-10-26 PROCEDURE — 99212 OFFICE O/P EST SF 10 MIN: CPT | Performed by: FAMILY MEDICINE

## 2020-10-26 RX ORDER — FAMOTIDINE 20 MG/1
20 TABLET, FILM COATED ORAL 2 TIMES DAILY
COMMUNITY
End: 2022-05-26 | Stop reason: ALTCHOICE

## 2020-11-02 ENCOUNTER — OFFICE VISIT (OUTPATIENT)
Dept: FAMILY MEDICINE CLINIC | Facility: CLINIC | Age: 35
End: 2020-11-02

## 2020-11-02 VITALS
HEART RATE: 81 BPM | HEIGHT: 59 IN | WEIGHT: 138 LBS | RESPIRATION RATE: 16 BRPM | DIASTOLIC BLOOD PRESSURE: 70 MMHG | OXYGEN SATURATION: 98 % | SYSTOLIC BLOOD PRESSURE: 100 MMHG | TEMPERATURE: 97.8 F | BODY MASS INDEX: 27.82 KG/M2

## 2020-11-02 DIAGNOSIS — E28.2 PCOS (POLYCYSTIC OVARIAN SYNDROME): ICD-10-CM

## 2020-11-02 DIAGNOSIS — L70.9 ACNE, UNSPECIFIED ACNE TYPE: Primary | ICD-10-CM

## 2020-11-02 PROBLEM — N39.0 URINARY TRACT INFECTION: Status: RESOLVED | Noted: 2020-04-06 | Resolved: 2020-11-02

## 2020-11-02 PROBLEM — N39.0 URINARY TRACT INFECTION WITHOUT HEMATURIA: Status: RESOLVED | Noted: 2019-10-21 | Resolved: 2020-11-02

## 2020-11-02 PROCEDURE — 99212 OFFICE O/P EST SF 10 MIN: CPT | Performed by: FAMILY MEDICINE

## 2021-03-15 ENCOUNTER — OFFICE VISIT (OUTPATIENT)
Dept: OBGYN CLINIC | Facility: CLINIC | Age: 36
End: 2021-03-15
Payer: COMMERCIAL

## 2021-03-15 VITALS — BODY MASS INDEX: 29.69 KG/M2 | SYSTOLIC BLOOD PRESSURE: 118 MMHG | WEIGHT: 147 LBS | DIASTOLIC BLOOD PRESSURE: 72 MMHG

## 2021-03-15 DIAGNOSIS — Z30.41 SURVEILLANCE OF PREVIOUSLY PRESCRIBED CONTRACEPTIVE PILL: ICD-10-CM

## 2021-03-15 DIAGNOSIS — Z11.51 SCREENING FOR HPV (HUMAN PAPILLOMAVIRUS): ICD-10-CM

## 2021-03-15 DIAGNOSIS — Z12.4 ENCOUNTER FOR PAPANICOLAOU SMEAR FOR CERVICAL CANCER SCREENING: ICD-10-CM

## 2021-03-15 DIAGNOSIS — Z01.419 ENCOUNTER FOR GYNECOLOGICAL EXAMINATION (GENERAL) (ROUTINE) WITHOUT ABNORMAL FINDINGS: Primary | ICD-10-CM

## 2021-03-15 DIAGNOSIS — E28.2 PCOS (POLYCYSTIC OVARIAN SYNDROME): ICD-10-CM

## 2021-03-15 PROCEDURE — 87624 HPV HI-RISK TYP POOLED RSLT: CPT | Performed by: NURSE PRACTITIONER

## 2021-03-15 PROCEDURE — G0145 SCR C/V CYTO,THINLAYER,RESCR: HCPCS | Performed by: NURSE PRACTITIONER

## 2021-03-15 PROCEDURE — 99385 PREV VISIT NEW AGE 18-39: CPT | Performed by: NURSE PRACTITIONER

## 2021-03-17 PROBLEM — Z01.419 ENCOUNTER FOR GYNECOLOGICAL EXAMINATION (GENERAL) (ROUTINE) WITHOUT ABNORMAL FINDINGS: Status: ACTIVE | Noted: 2021-03-17

## 2021-03-17 PROBLEM — Z30.41 SURVEILLANCE OF PREVIOUSLY PRESCRIBED CONTRACEPTIVE PILL: Status: ACTIVE | Noted: 2021-03-17

## 2021-03-17 LAB
HPV HR 12 DNA CVX QL NAA+PROBE: NEGATIVE
HPV16 DNA CVX QL NAA+PROBE: NEGATIVE
HPV18 DNA CVX QL NAA+PROBE: NEGATIVE

## 2021-03-17 NOTE — ASSESSMENT & PLAN NOTE
Normal findings on routine gyn exam  Advised monthly SBE, annual CBE and baseline screening mammo at age 38  Reviewed ASCCP guidelines and recommended pap with cotesting q3 yrs for this low risk patient; this was collected today  STI testing was offered and the patient declines; she reports low risk  The patient desires to continue current contraceptive method (OCP)  Diet/activity recommendations:  Encouraged daily Ca++ and vitamin D intake as well as daily weight bearing exercise for promotion of bone health  RTO in one year or sooner PRN

## 2021-03-17 NOTE — PROGRESS NOTES
Assessment/Plan:    Encounter for gynecological examination (general) (routine) without abnormal findings  Normal findings on routine gyn exam  Advised monthly SBE, annual CBE and baseline screening mammo at age 38  Reviewed ASCCP guidelines and recommended pap with cotesting q3 yrs for this low risk patient; this was collected today  STI testing was offered and the patient declines; she reports low risk  The patient desires to continue current contraceptive method (OCP)  Diet/activity recommendations:  Encouraged daily Ca++ and vitamin D intake as well as daily weight bearing exercise for promotion of bone health  RTO in one year or sooner PRN  Surveillance of previously prescribed contraceptive pill  The patient plans to continue current OCP for now but will RTO in about 3 mos if interested in transition to alternative method  Reviewed alternatives, risks/benefits and all questions answered  She denies ACHES on TIFFANY  She is aware condoms are advised with all sexual contact for prevention of STI  Refill provided  Reviewed reasons to call  Diagnoses and all orders for this visit:    Encounter for gynecological examination (general) (routine) without abnormal findings    Encounter for Papanicolaou smear for cervical cancer screening  -     Liquid-based pap, screening  -     HPV High Risk    Screening for HPV (human papillomavirus)  -     Liquid-based pap, screening  -     HPV High Risk    Surveillance of previously prescribed contraceptive pill    PCOS (polycystic ovarian syndrome)  -     norethindrone-ethinyl estradiol-iron (ESTROSTEP FE) 1-20/1-30/1-35 MG-MCG TABS; Take 1 tablet by mouth daily          Subjective:      Patient ID: Bo Walton is a 37 y o  female  This new patient presents for routine annual gyn exam  Hx is per pt  Carmen Marquez is a 37 y o  G0  PMHx notable for depression  Gyn hx notable for PCOS; denies abn pap or STI  FH neg for br, ov or colon ca    Withdrawal bleeds are light and reg on TIFFANY  Denies ACHES and desires to continue for now but is considering alternative methods  She denies acute gyn complaints  She denies pelvic pain, breast concerns, abnormal discharge, bowel/bladder dysfunction, depression/anxiety  Monogamous  She denies STI concerns  The following portions of the patient's history were reviewed and updated as appropriate: allergies, current medications, past family history, past medical history, past social history, past surgical history and problem list     Review of Systems   Constitutional: Negative  Respiratory: Negative  Cardiovascular: Negative  Gastrointestinal: Negative  Genitourinary: Negative  Musculoskeletal: Negative  Skin: Negative  Neurological: Negative  Psychiatric/Behavioral: Negative  Objective:      /72   Wt 66 7 kg (147 lb)   LMP 03/07/2021   BMI 29 69 kg/m²          Physical Exam  Constitutional:       Appearance: She is well-developed  HENT:      Head: Normocephalic and atraumatic  Eyes:      Pupils: Pupils are equal, round, and reactive to light  Neck:      Musculoskeletal: Normal range of motion and neck supple  Thyroid: No thyromegaly  Cardiovascular:      Rate and Rhythm: Normal rate and regular rhythm  Heart sounds: Normal heart sounds  Pulmonary:      Effort: Pulmonary effort is normal  No respiratory distress  Breath sounds: Normal breath sounds  No wheezing or rales  Chest:      Chest wall: No mass, deformity or tenderness  Breasts: Breasts are symmetrical          Right: No inverted nipple, mass, nipple discharge, skin change or tenderness  Left: No inverted nipple, mass, nipple discharge, skin change or tenderness  Abdominal:      General: There is no distension  Palpations: Abdomen is soft  There is no hepatomegaly, splenomegaly or mass  Tenderness: There is no abdominal tenderness  There is no guarding or rebound     Genitourinary: Labia:         Right: No rash, tenderness, lesion or injury  Left: No rash, tenderness, lesion or injury  Vagina: Normal  No foreign body  No vaginal discharge, erythema, tenderness or bleeding  Cervix: No cervical motion tenderness, discharge or friability  Uterus: Normal        Adnexa:         Right: No mass, tenderness or fullness  Left: No mass, tenderness or fullness  Rectum: Normal    Musculoskeletal: Normal range of motion  Lymphadenopathy:      Cervical: No cervical adenopathy  Skin:     General: Skin is warm and dry  Findings: No rash  Nails: There is no clubbing  Neurological:      Mental Status: She is alert and oriented to person, place, and time  Cranial Nerves: No cranial nerve deficit  Psychiatric:         Speech: Speech normal          Behavior: Behavior normal          Thought Content:  Thought content normal          Judgment: Judgment normal

## 2021-03-17 NOTE — ASSESSMENT & PLAN NOTE
The patient plans to continue current OCP for now but will RTO in about 3 mos if interested in transition to alternative method  Reviewed alternatives, risks/benefits and all questions answered  She denies ACHES on TIFFANY  She is aware condoms are advised with all sexual contact for prevention of STI  Refill provided  Reviewed reasons to call

## 2021-03-18 LAB
LAB AP GYN PRIMARY INTERPRETATION: NORMAL
Lab: NORMAL

## 2021-04-01 DIAGNOSIS — Z23 ENCOUNTER FOR IMMUNIZATION: ICD-10-CM

## 2021-10-04 ENCOUNTER — OFFICE VISIT (OUTPATIENT)
Dept: OBGYN CLINIC | Facility: CLINIC | Age: 36
End: 2021-10-04
Payer: COMMERCIAL

## 2021-10-04 VITALS — BODY MASS INDEX: 31.95 KG/M2 | DIASTOLIC BLOOD PRESSURE: 80 MMHG | WEIGHT: 158.2 LBS | SYSTOLIC BLOOD PRESSURE: 104 MMHG

## 2021-10-04 DIAGNOSIS — N94.10 DYSPAREUNIA IN FEMALE: ICD-10-CM

## 2021-10-04 DIAGNOSIS — B37.9 YEAST INFECTION: Primary | ICD-10-CM

## 2021-10-04 LAB
BV WHIFF TEST VAG QL: ABNORMAL
CLUE CELLS SPEC QL WET PREP: ABNORMAL
PH SMN: NORMAL [PH]
SL AMB POCT WET MOUNT: ABNORMAL
T VAGINALIS VAG QL WET PREP: ABNORMAL
YEAST VAG QL WET PREP: ABNORMAL

## 2021-10-04 PROCEDURE — 1036F TOBACCO NON-USER: CPT | Performed by: NURSE PRACTITIONER

## 2021-10-04 PROCEDURE — 87210 SMEAR WET MOUNT SALINE/INK: CPT | Performed by: NURSE PRACTITIONER

## 2021-10-04 PROCEDURE — 99214 OFFICE O/P EST MOD 30 MIN: CPT | Performed by: NURSE PRACTITIONER

## 2021-10-04 RX ORDER — CLOTRIMAZOLE 1 %
1 CREAM WITH APPLICATOR VAGINAL
Qty: 45 G | Refills: 0 | Status: SHIPPED | OUTPATIENT
Start: 2021-10-04 | End: 2022-05-26 | Stop reason: ALTCHOICE

## 2021-12-15 DIAGNOSIS — E28.2 PCOS (POLYCYSTIC OVARIAN SYNDROME): ICD-10-CM

## 2021-12-15 RX ORDER — NORETHINDRONE ACETATE AND ETHINYL ESTRADIOL 5-7-9-7
KIT ORAL
Qty: 84 TABLET | Refills: 1 | Status: SHIPPED | OUTPATIENT
Start: 2021-12-15 | End: 2022-04-11 | Stop reason: ALTCHOICE

## 2022-04-11 ENCOUNTER — ANNUAL EXAM (OUTPATIENT)
Dept: OBGYN CLINIC | Facility: CLINIC | Age: 37
End: 2022-04-11
Payer: COMMERCIAL

## 2022-04-11 VITALS
SYSTOLIC BLOOD PRESSURE: 92 MMHG | BODY MASS INDEX: 33.91 KG/M2 | WEIGHT: 168.2 LBS | DIASTOLIC BLOOD PRESSURE: 62 MMHG | HEIGHT: 59 IN

## 2022-04-11 DIAGNOSIS — E28.2 PCOS (POLYCYSTIC OVARIAN SYNDROME): ICD-10-CM

## 2022-04-11 DIAGNOSIS — Z01.419 ROUTINE GYNECOLOGICAL EXAMINATION: Primary | ICD-10-CM

## 2022-04-11 DIAGNOSIS — Z80.0 FAMILY HISTORY OF COLON CANCER: ICD-10-CM

## 2022-04-11 DIAGNOSIS — N89.8 VAGINAL DISCHARGE: ICD-10-CM

## 2022-04-11 DIAGNOSIS — N76.0 BACTERIAL VAGINITIS: ICD-10-CM

## 2022-04-11 DIAGNOSIS — B96.89 BACTERIAL VAGINITIS: ICD-10-CM

## 2022-04-11 DIAGNOSIS — R39.9 UTI SYMPTOMS: ICD-10-CM

## 2022-04-11 DIAGNOSIS — N94.10 DYSPAREUNIA, FEMALE: ICD-10-CM

## 2022-04-11 PROBLEM — Z13.220 SCREENING, LIPID: Status: RESOLVED | Noted: 2018-07-16 | Resolved: 2022-04-11

## 2022-04-11 LAB
BACTERIA UR QL AUTO: ABNORMAL /HPF
BILIRUB UR QL STRIP: NEGATIVE
BV WHIFF TEST VAG QL: POSITIVE
CLARITY UR: CLEAR
CLUE CELLS SPEC QL WET PREP: POSITIVE
COLOR UR: ABNORMAL
GLUCOSE UR STRIP-MCNC: NEGATIVE MG/DL
HGB UR QL STRIP.AUTO: NEGATIVE
KETONES UR STRIP-MCNC: NEGATIVE MG/DL
LEUKOCYTE ESTERASE UR QL STRIP: ABNORMAL
MUCOUS THREADS UR QL AUTO: ABNORMAL
NITRITE UR QL STRIP: NEGATIVE
NON-SQ EPI CELLS URNS QL MICRO: ABNORMAL /HPF
PH SMN: 4 [PH]
PH UR STRIP.AUTO: 6 [PH]
PROT UR STRIP-MCNC: NEGATIVE MG/DL
RBC #/AREA URNS AUTO: ABNORMAL /HPF
SL AMB LEUKOCYTE ESTERASE,UA: ABNORMAL
SL AMB POCT BILIRUBIN,UA: NEGATIVE
SL AMB POCT BLOOD,UA: ABNORMAL
SL AMB POCT CLARITY,UA: CLEAR
SL AMB POCT COLOR,UA: YELLOW
SL AMB POCT KETONES,UA: NEGATIVE
SL AMB POCT NITRITE,UA: NEGATIVE
SL AMB POCT PH,UA: 6
SL AMB POCT SPECIFIC GRAVITY,UA: 1.02
SL AMB POCT UROBILINOGEN: 0.2
SP GR UR STRIP.AUTO: 1.02 (ref 1–1.03)
T VAGINALIS VAG QL WET PREP: NEGATIVE
UROBILINOGEN UR STRIP-ACNC: <2 MG/DL
WBC #/AREA URNS AUTO: ABNORMAL /HPF
YEAST VAG QL WET PREP: NEGATIVE

## 2022-04-11 PROCEDURE — 81002 URINALYSIS NONAUTO W/O SCOPE: CPT | Performed by: PHYSICIAN ASSISTANT

## 2022-04-11 PROCEDURE — 99395 PREV VISIT EST AGE 18-39: CPT | Performed by: PHYSICIAN ASSISTANT

## 2022-04-11 PROCEDURE — 87210 SMEAR WET MOUNT SALINE/INK: CPT | Performed by: PHYSICIAN ASSISTANT

## 2022-04-11 PROCEDURE — 3008F BODY MASS INDEX DOCD: CPT | Performed by: PHYSICIAN ASSISTANT

## 2022-04-11 PROCEDURE — 81001 URINALYSIS AUTO W/SCOPE: CPT | Performed by: PHYSICIAN ASSISTANT

## 2022-04-11 PROCEDURE — 87086 URINE CULTURE/COLONY COUNT: CPT | Performed by: PHYSICIAN ASSISTANT

## 2022-04-11 PROCEDURE — 99213 OFFICE O/P EST LOW 20 MIN: CPT | Performed by: PHYSICIAN ASSISTANT

## 2022-04-11 RX ORDER — OMEPRAZOLE 20 MG/1
20 CAPSULE, DELAYED RELEASE ORAL DAILY
COMMUNITY

## 2022-04-11 RX ORDER — CLOTRIMAZOLE AND BETAMETHASONE DIPROPIONATE 10; .64 MG/G; MG/G
CREAM TOPICAL
Qty: 30 G | Refills: 0 | Status: SHIPPED | OUTPATIENT
Start: 2022-04-11 | End: 2022-05-26 | Stop reason: ALTCHOICE

## 2022-04-11 RX ORDER — NORETHINDRONE ACETATE AND ETHINYL ESTRADIOL 5-7-9-7
1 KIT ORAL DAILY
Qty: 84 TABLET | Refills: 4 | Status: CANCELLED | OUTPATIENT
Start: 2022-04-11

## 2022-04-11 RX ORDER — BUPROPION HYDROCHLORIDE 300 MG/1
300 TABLET ORAL DAILY
COMMUNITY
Start: 2022-03-16

## 2022-04-11 RX ORDER — METRONIDAZOLE 500 MG/1
500 TABLET ORAL EVERY 12 HOURS SCHEDULED
Qty: 14 TABLET | Refills: 0 | Status: SHIPPED | OUTPATIENT
Start: 2022-04-11 | End: 2022-04-18

## 2022-04-11 NOTE — PROGRESS NOTES
Assessment   31 y o  Veronica Gómez presenting for annual exam      Plan   Diagnoses and all orders for this visit:      Routine gynecological examination  Normal findings on routine exam (with exception to vaginitis)  Encouraged 150 min of exercise per week  Reviewed balanced diet  Multivitamin encouraged   Breast awareness/SBE encouraged       PCOS (polycystic ovarian syndrome)    Discussed pathophysiology of PCOS and its chronicity  Reveiwed  elements of the syndrome in regards to anovulation, oligoovulation, menstrual irregularities, weight gain, hirsutism, insulin resistance, increased risk for cardiovascular disease  Discussed possible risk of endometrial hyperplasia and possible advancement to endometrial cancer associated with untreated chronic anovulation  We reviewed how weight loss can help to restore ovulatory cycles  She is interested in seeing how her body responds to being off of OCP and if menses have changed at all  She admits she is unhappy with light and rare menses and has had some dysmenorrhea  Was given option for alternate contraceptive options, but declines noting she and partner will use condoms for now  Recommended she go no longer than 3 months without menses, and if this occurs to call office so we can discuss options to induce withdrawal bleed  She is agreeable and understands the importance of tx of chronic anovulation  Bacterial vaginitis  -     metroNIDAZOLE (FLAGYL) 500 mg tablet; Take 1 tablet (500 mg total) by mouth every 12 (twelve) hours for 7 days  -     clotrimazole-betamethasone (LOTRISONE) 1-0 05 % cream; Apply externally twice daily for up to 2 weeks as needed for vulvar irritation  Reviewed findings of BV on wet mount as well as very inflamed vulva  Will treat with metronidazole 500 mg BID x 7 days  Rx sent to pharmacy  We reviewed common side effects including antabuse like reaction  To apply lotrisone BID for up to 2 weeks   RTO in 1 week if sx not fully resolved  Advised pelvic rest until sx fully resolved  Reviewed vulvar hygiene  UTI symptoms  -     POCT urine dip  -     Urinalysis with microscopic  -     Urine culture    Urine dip only shows trace leukocytes  Will send for culture to r/o UTI, however, sx could also be explained by significant vulvar irritation  Plan pending culture results  Will notify pt in next 2-3 days when available  In interim recommended increased water intake, OTC pyridium for discomfort, f/u sooner if sx worsening  Vaginal discharge  -     POCT wet mount  -     clotrimazole-betamethasone (LOTRISONE) 1-0 05 % cream; Apply externally twice daily for up to 2 weeks as needed for vulvar irritation  As above  Dyspareunia, female  -     Ambulatory Referral to Physical Therapy; Future    We discussed cyclic nature of dyspareunia  Option fpr PT referral appreciated  Will call to schedule  Pelvic rest until resolution of vaginitis sx recommended  Family history of colon cancer  Pt's mother and maternal uncle with colon cancer, and maternal cousin with breast CA  LMOM for patient after discharge for option of genetic consultation  Requested return call if desired  Pap due 2026      RTO one year for yearly exam or sooner as needed  __________________________________________________________________    Subjective     Taylor Kearney is a 31 y o  Cletis Winnie with rare menses who is sexually active and currently using Binnie Cezar for contraception presenting for annual exam  She does not want STD testing today  SCREENING  Last Pap: 03/15/2021 NILM/Neg  Last Mammo: N/A  Last Colonoscopy: N/A      GYN    Complaints: Complains of vaginal discharge for the past 2 weeks  Reports grey in color, associated with dyspareunia, dysuria, itching, and odor  Reports ongoing dyspareunia, worsened with current sx  Previously referred to PT, but did not complete noting she wasn't able to schedule   Pain is felt prior to penetration, then decrease with time  No new partners and does not want STD testing  Hx of PCOS    Menarche at age 8  Periods occur every few months, lasting 1 days  Dysmenorrhea:mild to moderate  Cyclic symptoms include none  Sexually active: Yes - single partner - male x last few years  Hx STI: denies   Hx Abnormal pap: denies   We reviewed ASCCP guidelines for Pap testing today  This low risk patient meets criteria for q 5 year testing  Gardasil: She has not completed the Gardasil series  OB     Desires future fertility -- perhaps in next few years    Complaints: Complains of dysuria, urgency, and frequency  Started at same time as vaginal discharge but feels this is separate issue  No associated with F/C/S, abd or flank pain  Reports she is prone to UTIs  No tx tried  Denies hematuria, leakage / change in stream, difficulty urinating  BREAST  Complaints: denies  Denies: breast lump, breast tenderness, dryness, nipple discharge, pruritus, skin color change, and skin lesion(s)  Personal hx: none reported    Pertinent Family Hx:   Family hx of breast cancer: Maternal cousin  Family hx of ovarian cancer: no  Family hx of colon cancer: Mom, maternal uncle       GENERAL  PMH reviewed/updated and is as below  Patient does follow with a PCP  Exercise: regular    Past Medical History:   Diagnosis Date    Abnormal weight gain 2006    Acne 2006    Depression     Dysfunction of both eustachian tubes     last assessed 17    History of PCOS     Hyperlipidemia     Polycystic ovarian syndrome 2009    Severe single current episode of major depressive disorder, without psychotic features ([facility] [address] )     last assessed 16    Suicidal ideation     last assessed 10/24/17       Past Surgical History:   Procedure Laterality Date    [place] ESOPHAGOGASTRODUODENOSCOPY TRANSORAL DIAGNOSTIC N/A 10/24/2018    Procedure: ESOPHAGOGASTRODUODENOSCOPY (EGD);   Surgeon: Harrison Reyes MD; [facility]; Service: Gastroenterology    WISDOM TOOTH EXTRACTION           Current Outpatient Medications:     buPROPion (WELLBUTRIN XL) 300 mg 24 hr tablet, Take 300 mg by mouth daily, Disp: , Rfl:     citalopram (CeleXA) 20 mg tablet, Take 20 mg by mouth daily at bedtime, Disp: , Rfl:     omeprazole (PriLOSEC) 20 mg delayed release capsule, Take 20 mg by mouth daily, Disp: , Rfl:     buPROPion (WELLBUTRIN XL) 150 mg 24 hr tablet, Take 300 mg by mouth   (Patient not taking: Reported on 4/11/2022 ), Disp: , Rfl:     clotrimazole (GYNE-LOTRIMIN) 1 % vaginal cream, Insert 1 applicator into the vagina daily at bedtime (Patient not taking: Reported on 4/11/2022 ), Disp: 45 g, Rfl: 0    clotrimazole-betamethasone (LOTRISONE) 1-0 05 % cream, Apply externally twice daily for up to 2 weeks as needed for vulvar irritation  , Disp: 30 g, Rfl: 0    famotidine (PEPCID) 20 mg tablet, Take 20 mg by mouth 2 (two) times a day (Patient not taking: Reported on 10/4/2021), Disp: , Rfl:     metroNIDAZOLE (FLAGYL) 500 mg tablet, Take 1 tablet (500 mg total) by mouth every 12 (twelve) hours for 7 days, Disp: 14 tablet, Rfl: 0    Allergies   Allergen Reactions    Amoxicillin GI Intolerance       Social History     Socioeconomic History    Marital status: Single     Spouse name: Not on file    Number of children: Not on file    Years of education: Not on file    Highest education level: Not on file   Occupational History    Not on file   Tobacco Use    Smoking status: Never Smoker    Smokeless tobacco: Never Used   Vaping Use    Vaping Use: Never used   Substance and Sexual Activity    Alcohol use: Yes     Comment: rare alcohol use    Drug use: No    Sexual activity: Yes     Partners: Male     Birth control/protection: OCP   Other Topics Concern    Not on file   Social History Narrative    Caffeine use    Occupation     Social Determinants of Health     Financial Resource Strain: Not on file   Food Insecurity: Not on file   Transportation Needs: Not on file   Physical Activity: Not on file   Stress: Not on file   Social Connections: Not on file   Intimate Partner Violence: Not on file   Housing Stability: Not on file       Review of Systems     ROS:  Constitutional: Negative for appetite change, fatigue and unexpected weight change  Respiratory: Negative for cough, wheezing, shortness of breath  Cardiovascular: Negative for chest pain, palpitations, and leg swelling  Gastrointestinal: Negative for abdominal pain and change in bowel habits/constipation/diarrhea  Breasts:  Negative for tenderness, pain or masses  Genitourinary: + for dysuria, frequency, urgency  Grey, odorous vaginal discharge, vulvar itching  Negative for pelvic pain, vaginal bleeding  Psychiatric: Negative for mood difficulties  Objective      BP 92/62 (BP Location: Left arm, Patient Position: Sitting, Cuff Size: Large)   Ht 4' 11 25" (1 505 m)   Wt 76 3 kg (168 lb 3 2 oz)   LMP 04/03/2022   BMI 33 69 kg/m²     Wet Mount:    Yeast, Wet Prep Negative    pH 4    Whiff Test Positive    Clue Cells Positive    Trich, Wet Prep Negative      POCT Urine Dip:    Component 4/11/22  2:17 PM   LEUKOCYTE ESTERASE,UA trace    BLOOD,UA neg     COLOR,UA yellow    CLARITY,UA clear    NITRITE,UA negative    BILIRUBIN,UA negative    SPECIFIC GRAVITY,UA 1 020    SL AMB POCT UROBILINOGEN 0 2    KETONES,UA negative     PH,UA 6          Physical Examination:    Patient appears well and is not in distress  Neck is supple without masses  Breasts are symmetrical without mass, tenderness, nipple discharge, skin changes or adenopathy  Abdomen is soft and nontender without masses  External genitals are deep erythema and inflammation of the labia minora b/l, with significant tenderness to even light touch  No fissures, lesions, or other rashes     Urethral meatus and urethra are normal  Bladder is normal to palpation  Vagina is mildly erythematous with thin white discharge  No bleeding  Pain is reported with insertion of speculum with vaginismus noted  Cervix is normal without discharge or lesion  Uterus is normal, mobile, nontender without palpable mass  Adnexa are normal, nontender, without palpable mass  Exam limited due to patients level of discomfort and to body habitus

## 2022-04-11 NOTE — PROGRESS NOTES
Patient is here for annual exam   She states she has been having pelvic pressure, urinary urgency, grayish vaginal discharge, odor and itching the past 2 weeks  LMP:4/3/22  Periods are every few months  Periods last 1-2 days  Patient is sexually active  Patient does not desire STD testing  Birth control method: pills  Patient hasn't completed Gardasil series  Last Pap: 3/15/21  Pap: neg/HPV: neg    Family history of breast, uterine or colon cancer: breast cancer- maternal cousin; colon cancer- mother and maternal uncle

## 2022-04-11 NOTE — PATIENT INSTRUCTIONS
Take the antibiotic as directed with food  While on this medication take a probiotic such as yogurt  Do not drink alcohol while on this medication  Use only a nonscented soap for cleansing  Avoid over washing  Return to the office in 1 week if symptoms not fully resolved    Condoms with all sexual encounters to prevent STDs  Go to physical therapy    Try to get 150 minutes of exercise per week  Eat a balanced diet avoiding sugary beverages, refined sugars, and processed foods  Try to eat a combined 5-6 servings of fruits and vegetables per day  Try to do monthly breast exams, or frequently enough that you are aware if there are any changes in the appearance or texture  Call the office with any change  Take a multivitamin  Return in 1 year for your yearly exam or sooner as needed

## 2022-04-14 LAB — BACTERIA UR CULT: NORMAL

## 2022-05-26 ENCOUNTER — OFFICE VISIT (OUTPATIENT)
Dept: FAMILY MEDICINE CLINIC | Facility: CLINIC | Age: 37
End: 2022-05-26
Payer: COMMERCIAL

## 2022-05-26 VITALS
OXYGEN SATURATION: 97 % | HEART RATE: 97 BPM | RESPIRATION RATE: 16 BRPM | BODY MASS INDEX: 34.68 KG/M2 | HEIGHT: 59 IN | DIASTOLIC BLOOD PRESSURE: 70 MMHG | SYSTOLIC BLOOD PRESSURE: 110 MMHG | WEIGHT: 172 LBS

## 2022-05-26 DIAGNOSIS — F33.1 MODERATE EPISODE OF RECURRENT MAJOR DEPRESSIVE DISORDER (HCC): ICD-10-CM

## 2022-05-26 DIAGNOSIS — E28.2 PCOS (POLYCYSTIC OVARIAN SYNDROME): ICD-10-CM

## 2022-05-26 DIAGNOSIS — R63.5 WEIGHT GAIN: ICD-10-CM

## 2022-05-26 DIAGNOSIS — L68.0 HIRSUTISM: ICD-10-CM

## 2022-05-26 DIAGNOSIS — E78.49 OTHER HYPERLIPIDEMIA: ICD-10-CM

## 2022-05-26 DIAGNOSIS — L70.0 ACNE VULGARIS: Primary | ICD-10-CM

## 2022-05-26 PROCEDURE — 99214 OFFICE O/P EST MOD 30 MIN: CPT | Performed by: FAMILY MEDICINE

## 2022-05-26 PROCEDURE — 1036F TOBACCO NON-USER: CPT | Performed by: FAMILY MEDICINE

## 2022-05-26 RX ORDER — CLINDAMYCIN PHOSPHATE 11.9 MG/ML
SOLUTION TOPICAL 2 TIMES DAILY
Qty: 30 ML | Refills: 0 | Status: SHIPPED | OUTPATIENT
Start: 2022-05-26

## 2022-05-26 NOTE — ASSESSMENT & PLAN NOTE
She complains of excessive hair growth but there is no visible thick hair on the chin, will get the labs and follow-up

## 2022-05-26 NOTE — PROGRESS NOTES
Assessment/Plan:    Problem List Items Addressed This Visit        Endocrine    PCOS (polycystic ovarian syndrome)     She was getting treatment from the gynecologist and she finished the birth control pills recently           Relevant Orders    Hemoglobin A1C    TSH, 3rd generation with Free T4 reflex    Testosterone       Musculoskeletal and Integument    Hirsutism     She complains of excessive hair growth but there is no visible thick hair on the chin, will get the labs and follow-up           Relevant Medications    clindamycin (CLEOCIN T) 1 % external solution    Other Relevant Orders    Hemoglobin A1C    Testosterone    Acne - Primary     She has tried over-the-counter benzoyl peroxide with no improvement, and she tried salicylic acid, will try the clindamycin topical cream           Relevant Medications    clindamycin (CLEOCIN T) 1 % external solution    Other Relevant Orders    Testosterone       Other    Hyperlipidemia    Relevant Orders    CBC and differential    Comprehensive metabolic panel    Lipid panel    TSH, 3rd generation with Free T4 reflex    Moderate episode of recurrent major depressive disorder (HCC)     Stable on medication and follows psychiatrist           Weight gain    Relevant Orders    Comprehensive metabolic panel    Lipid panel    TSH, 3rd generation with Free T4 reflex    Ambulatory Referral to Weight Management          Return in about 3 weeks (around 6/16/2022) for Recheck  Chief Complaint   Patient presents with    Acne    Weight Gain       Subjective:   Patient ID: Juan Carlos Henriquez is a 30 y o  female  She says she has acne for long time and she tried over-the-counter medication without any improvement she also has polycystic ovaries and she was on birth control pills through the gynecologist and she gained lot of weight, she says she stop them and she will watch her periods    She also concerned about her testosterone level,  She says she get some extra hair on her chin but they are not very thick    HPI    Review of Systems   Constitutional: Positive for unexpected weight change  Eyes: Negative  Respiratory: Negative  Cardiovascular: Negative  Gastrointestinal: Negative  Genitourinary: Negative  Musculoskeletal: Negative  Skin:        Acne on face and more hair growth        Objective:  Physical Exam  Vitals and nursing note reviewed  Constitutional:       Appearance: She is well-developed  HENT:      Head: Normocephalic and atraumatic  Right Ear: External ear normal       Left Ear: External ear normal    Eyes:      General: No scleral icterus  Extraocular Movements: Extraocular movements intact  Neck:      Thyroid: No thyromegaly  Cardiovascular:      Rate and Rhythm: Normal rate and regular rhythm  Heart sounds: Normal heart sounds  Pulmonary:      Effort: Pulmonary effort is normal       Breath sounds: Normal breath sounds  No wheezing or rales  Musculoskeletal:         General: Normal range of motion  Cervical back: Normal range of motion and neck supple  Right lower leg: No edema  Left lower leg: No edema  Lymphadenopathy:      Cervical: No cervical adenopathy  Skin:     Findings: No erythema or rash  Neurological:      General: No focal deficit present  Mental Status: She is alert  Psychiatric:         Mood and Affect: Mood normal         Depression Screening Follow-up Plan: Patient's depression screening was positive with a PHQ-2 score of   Their PHQ-9 score was   Continue regular follow-up with their psychologist/therapist/psychiatrist who is managing their mental health condition(s)  Past Surgical History:   Procedure Laterality Date    [place] ESOPHAGOGASTRODUODENOSCOPY TRANSORAL DIAGNOSTIC N/A 10/24/2018    Procedure: ESOPHAGOGASTRODUODENOSCOPY (EGD); Surgeon: Mehdi Mcmillan MD;  [facility];   Service: Gastroenterology    WISDOM TOOTH EXTRACTION         Family History   Problem Relation Age of Onset    Depression Mother     Colorectal Cancer Mother 72    Basal cell carcinoma Father     Breast cancer Cousin     Colon cancer Maternal Uncle     Ovarian cancer Neg Hx          Current Outpatient Medications:     buPROPion (WELLBUTRIN XL) 300 mg 24 hr tablet, Take 300 mg by mouth daily, Disp: , Rfl:     citalopram (CeleXA) 20 mg tablet, Take 20 mg by mouth daily at bedtime, Disp: , Rfl:     clindamycin (CLEOCIN T) 1 % external solution, Apply topically 2 (two) times a day, Disp: 30 mL, Rfl: 0    omeprazole (PriLOSEC) 20 mg delayed release capsule, Take 20 mg by mouth daily, Disp: , Rfl:     Allergies   Allergen Reactions    Amoxicillin GI Intolerance       Vitals:    05/26/22 1606   BP: 110/70   Pulse: 97   Resp: 16   SpO2: 97%   Weight: 78 kg (172 lb)   Height: 4' 11 25" (1 505 m)

## 2022-05-26 NOTE — ASSESSMENT & PLAN NOTE
She has tried over-the-counter benzoyl peroxide with no improvement, and she tried salicylic acid, will try the clindamycin topical cream

## 2022-06-02 ENCOUNTER — TELEPHONE (OUTPATIENT)
Dept: BARIATRICS | Facility: CLINIC | Age: 37
End: 2022-06-02

## 2022-06-02 NOTE — TELEPHONE ENCOUNTER
Patient called the office in reference to having a referral  I called and no answer  Vcmail left to give us a call back to schedule

## 2022-06-06 ENCOUNTER — OFFICE VISIT (OUTPATIENT)
Dept: BARIATRICS | Facility: CLINIC | Age: 37
End: 2022-06-06
Payer: COMMERCIAL

## 2022-06-06 VITALS
DIASTOLIC BLOOD PRESSURE: 72 MMHG | WEIGHT: 174.2 LBS | HEIGHT: 60 IN | SYSTOLIC BLOOD PRESSURE: 112 MMHG | HEART RATE: 98 BPM | BODY MASS INDEX: 34.2 KG/M2

## 2022-06-06 DIAGNOSIS — E66.9 OBESITY, CLASS I, BMI 30-34.9: Primary | ICD-10-CM

## 2022-06-06 DIAGNOSIS — R63.5 WEIGHT GAIN: ICD-10-CM

## 2022-06-06 PROCEDURE — 99203 OFFICE O/P NEW LOW 30 MIN: CPT | Performed by: FAMILY MEDICINE

## 2022-06-06 NOTE — PROGRESS NOTES
Assessment/Plan:  Osker Dakins was seen today for consult  Diagnoses and all orders for this visit:    Obesity, Class I, BMI 30-34 9    Weight gain  -     Ambulatory Referral to Weight Management    Likely secondary to high caloric intake and high carbohydrate intake  Sample menu provided  May benefit from medication during follow-up appointment  Already on Wellbutrin for treatment of depression and anxiety  - Discussed options of HealthyCORE-Intensive Lifestyle Intervention Program, Very Low Calorie Diet-VLCD and Conservative Program and the role of weight loss medications  - Explained the importance of making lifestyle changes first before starting any anti-obesity medications  Patient should demonstrate lifestyle changes first before anti-obesity medication can be initiated  - Patient is interested in pursuing Conservative Program  - Initial weight loss goal of 5-10% weight loss for improved health  - Weight loss can improve patient's co-morbid conditions and/or prevent weight-related complications  Goals:  Do not skip any meals! Food log (ie ) www myfitnesspal com,sparkpeople  com,loseit com,calorieking  com,etc  baritastic (use skinnytaste  com, dietdoctor  com or smartphone yung [employer] for recipes)  No sugary beverages  At least 64oz of water daily  Increase physical activity by 10 minutes daily  Gradually increase physical activity to a goal of 5 days per week for 30 minutes of MODERATE intensity PLUS 2 days per week of FULL BODY resistance training (use smartphone apps "[employer] Home Workout, etc )  Goal protein 90g per day  Goal carbohydrates 90g per day   2287-4309 calories per day    Total time spent: 30 min, with >50% face-to-face time spent counseling patient on nonsurgical interventions for the treatment of excess weight   Discussed in detail nonsurgical options including intensive lifestyle intervention program, very low-calorie diet program and conservative program   Discussed the role of weight loss medications  Counseled patient on diet behavior and exercise modification for weight loss  Follow up in approximately 3 months with Non-Surgical Physician/Advanced Practitioner  Subjective:   Chief Complaint   Patient presents with    Consult     Pt is here today for MWM consult  Patient ID: Bo Walton  is a 30 y o  female with excess weight/obesity here to pursue weight management  Previous notes and records have been reviewed  Past Medical History:   Diagnosis Date    Abnormal weight gain 03/23/2006    Acne 03/23/2006    Depression     Dysfunction of both eustachian tubes     last assessed 12/21/17    History of PCOS     Hyperlipidemia     Polycystic ovarian syndrome 01/30/2009    Severe single current episode of major depressive disorder, without psychotic features ([facility] [address] )     last assessed 06/21/16    Suicidal ideation     last assessed 10/24/17     Past Surgical History:   Procedure Laterality Date    [place] ESOPHAGOGASTRODUODENOSCOPY TRANSORAL DIAGNOSTIC N/A 10/24/2018    Procedure: ESOPHAGOGASTRODUODENOSCOPY (EGD); Surgeon: Noman Arias MD;  [facility]; Service: Gastroenterology    WISDOM TOOTH EXTRACTION         HPI:  Patient presents for weight management consultation      Wt Readings from Last 20 Encounters:   06/06/22 79 kg (174 lb 3 2 oz)   05/26/22 78 kg (172 lb)   04/11/22 76 3 kg (168 lb 3 2 oz)   10/04/21 71 8 kg (158 lb 3 2 oz)   03/15/21 66 7 kg (147 lb)   11/02/20 62 6 kg (138 lb)   10/26/20 62 6 kg (138 lb)   10/21/19 55 8 kg (123 lb)   07/25/19 54 4 kg (120 lb)   03/06/19 55 4 kg (122 lb 3 2 oz)   01/21/19 56 5 kg (124 lb 9 6 oz)   11/26/18 55 3 kg (122 lb)   10/29/18 56 7 kg (125 lb)   10/24/18 56 7 kg (125 lb)   10/11/18 56 7 kg (125 lb)   10/08/18 57 3 kg (126 lb 6 4 oz)   09/10/18 58 5 kg (129 lb)   07/16/18 58 5 kg (129 lb)   01/08/18 58 5 kg (129 lb)   12/21/17 57 6 kg (127 lb)     Obesity/Excess Weight:  Severity: Mild  Onset:      Modifiers: Diet and Exercise  Contributing factors: Poor Food Choices and Insufficient Physical Activity  Associated symptoms: fatigue, increased joint pain and decreased exercise capacity    B- [employer] or bowl of cereal or fried egg on whole wheat bread  S- no  L- cafeteria food w/ cake or hard boiled egg, yogurt, apple sauce and cheese  S- no  D- bowl of pasta or pizza or salad  S- popcorn    Hydration: 32oz water daily, 8oz coffee w/ oatmilk and sweetened creamer  Alcohol: 2x/mo 2 drinks  Smoking: no  Exercise: 30min 5 days/wk  Occupation:  (part active and part sedentary  Sleep: 6 5hrs  STOP ban/8    The following portions of the patient's history were reviewed and updated as appropriate: allergies, current medications, past family history, past medical history, past social history, past surgical history, and problem list     Review of Systems   Constitutional: Negative for fever  Respiratory: Negative for shortness of breath  Cardiovascular: Negative for chest pain  Gastrointestinal: Negative for abdominal pain and blood in stool  Genitourinary: Negative for dysuria and hematuria  Musculoskeletal: Positive for back pain  Negative for arthralgias and myalgias  Skin: Negative for rash  Neurological: Positive for headaches  Psychiatric/Behavioral: Positive for dysphoric mood  Negative for suicidal ideas  The patient is nervous/anxious  Objective:  /72 (BP Location: Left arm, Patient Position: Sitting, Cuff Size: Standard)   Pulse 98   Ht 4' 11 5" (1 511 m)   Wt 79 kg (174 lb 3 2 oz)   LMP 2022   BMI 34 60 kg/m²   Constitutional: Well-developed, well-nourished and Obese Body mass index is 34 6 kg/m²  Asaf Rutledge HEENT: No conjunctival injection  Pulmonary: No increased work of breathing or signs of respiratory distress  CV: Well-perfused  GI: Obese  Non-distended  MSK: No edema   Neuro: Oriented to person, place and time  Normal Speech  Normal gait  Psych: Normal affect and mood  Labs and Imaging  Recent labs and imaging have been personally reviewed    Lab Results   Component Value Date    WBC 6 86 10/11/2018    HGB 13 3 10/11/2018    HCT 42 0 10/11/2018    MCV 93 10/11/2018     10/11/2018     Lab Results   Component Value Date    SODIUM 140 10/11/2018    K 3 9 10/11/2018     10/11/2018    CO2 28 10/11/2018    AGAP 8 10/11/2018    BUN 10 10/11/2018    CREATININE 0 71 10/11/2018    GLUC 107 10/11/2018    CALCIUM 8 9 10/11/2018    AST 8 10/11/2018    ALT 17 10/11/2018    ALKPHOS 51 10/11/2018    TP 7 4 10/11/2018    TBILI 0 10 (L) 10/11/2018    EGFR 114 10/11/2018     Lab Results   Component Value Date    HGBA1C 5 2 03/02/2018     No results found for: [id number], TSH  No results found for: CHOLESTEROL  No results found for: HDL  No results found for: TRIG  No results found for: [address]

## 2022-06-07 LAB — HBA1C MFR BLD HPLC: 5.7 %

## 2022-06-28 ENCOUNTER — OFFICE VISIT (OUTPATIENT)
Dept: FAMILY MEDICINE CLINIC | Facility: CLINIC | Age: 37
End: 2022-06-28
Payer: COMMERCIAL

## 2022-06-28 VITALS
HEIGHT: 60 IN | HEART RATE: 80 BPM | DIASTOLIC BLOOD PRESSURE: 76 MMHG | SYSTOLIC BLOOD PRESSURE: 98 MMHG | BODY MASS INDEX: 34.16 KG/M2 | OXYGEN SATURATION: 97 % | RESPIRATION RATE: 18 BRPM | WEIGHT: 174 LBS

## 2022-06-28 DIAGNOSIS — F33.1 MODERATE EPISODE OF RECURRENT MAJOR DEPRESSIVE DISORDER (HCC): ICD-10-CM

## 2022-06-28 DIAGNOSIS — L68.0 HIRSUTISM: ICD-10-CM

## 2022-06-28 DIAGNOSIS — E78.2 MIXED HYPERLIPIDEMIA: ICD-10-CM

## 2022-06-28 DIAGNOSIS — E28.2 PCOS (POLYCYSTIC OVARIAN SYNDROME): ICD-10-CM

## 2022-06-28 DIAGNOSIS — R73.09 ABNORMAL BLOOD SUGAR: ICD-10-CM

## 2022-06-28 DIAGNOSIS — R63.5 WEIGHT GAIN: ICD-10-CM

## 2022-06-28 DIAGNOSIS — L70.0 ACNE VULGARIS: Primary | ICD-10-CM

## 2022-06-28 PROCEDURE — 3008F BODY MASS INDEX DOCD: CPT | Performed by: FAMILY MEDICINE

## 2022-06-28 PROCEDURE — 99214 OFFICE O/P EST MOD 30 MIN: CPT | Performed by: FAMILY MEDICINE

## 2022-06-28 PROCEDURE — 1036F TOBACCO NON-USER: CPT | Performed by: FAMILY MEDICINE

## 2022-06-28 NOTE — ASSESSMENT & PLAN NOTE
Previously diagnosed polycystic ovaries by gynecologist, currently not on any medication, her labs are reviewed testosterone in normal level, she gets her monthly    Every other month, previously she was treated with birth control pills and now she stopped

## 2022-06-28 NOTE — ASSESSMENT & PLAN NOTE
Total cholesterol is slightly elevated, as she will start the weight loss diet will recheck her lipid in 6 months

## 2022-06-28 NOTE — PROGRESS NOTES
Assessment/Plan:    Problem List Items Addressed This Visit        Endocrine    PCOS (polycystic ovarian syndrome)     Previously diagnosed polycystic ovaries by gynecologist, currently not on any medication, her labs are reviewed testosterone in normal level, she gets her monthly  Every other month, previously she was treated with birth control pills and now she stopped           Relevant Orders    Hemoglobin A1C       Musculoskeletal and Integument    Hirsutism    Acne - Primary       Other    Hyperlipidemia     Total cholesterol is slightly elevated, as she will start the weight loss diet will recheck her lipid in 6 months           Relevant Orders    Lipid panel    Moderate episode of recurrent major depressive disorder ([address] )     Depression under control and follows psychiatrist            Weight gain     She started seeing the bariatric team and will start the dieting           Abnormal blood sugar    Relevant Orders    Hemoglobin A1C           Return in about 6 months (around 12/28/2022) for Recheck  Chief Complaint   Patient presents with    Follow-up     Review lab results   Depression Screening Follow-up Plan: Patient's depression screening was positive with a PHQ-2 score of   Their PHQ-9 score was   Continue regular follow-up with their psychologist/therapist/psychiatrist who is managing their mental health condition(s)  Subjective:   Patient ID: Obey Velasco is a 30 y o  female  Follow-up on her labs, she has been diagnosed polycystic ovaries and previously was given treatment by gynecologist by oral birth control pills now she stopped she gets her periods every other month    She use the clindamycin external solution on the face and she says acne is improving,  She follows a psychiatrist for depression which is stable  She has gained weight she met with the bariatric surgeon and she is advised to have 1000 calorie diet which she will start to lose weight and if she lose some weight then they can give her some medications to help her lose more weight    HPI    Review of Systems   Constitutional: Negative for activity change, appetite change, chills, fatigue, fever and unexpected weight change  HENT: Negative for congestion, ear discharge, ear pain, nosebleeds, postnasal drip, rhinorrhea, sinus pressure, sneezing, sore throat, trouble swallowing and voice change  Eyes: Negative for photophobia, pain, discharge, redness and itching  Respiratory: Negative for cough, chest tightness, shortness of breath and wheezing  Cardiovascular: Negative for chest pain, palpitations and leg swelling  Gastrointestinal: Negative for abdominal pain, constipation, diarrhea, nausea and vomiting  Endocrine: Negative for polyuria  Genitourinary: Negative for dysuria, frequency and urgency  Musculoskeletal: Negative for arthralgias, back pain, myalgias and neck pain  Skin: Negative for color change, pallor and rash  Acne on the face   Allergic/Immunologic: Negative for environmental allergies and food allergies  Neurological: Negative for dizziness, weakness, light-headedness and headaches  Hematological: Negative for adenopathy  Does not bruise/bleed easily  Psychiatric/Behavioral: Negative for behavioral problems  The patient is not nervous/anxious  Objective:  Physical Exam  Vitals and nursing note reviewed  Constitutional:       Appearance: She is well-developed  HENT:      Head: Normocephalic and atraumatic  Eyes:      General: No scleral icterus  Conjunctiva/sclera: Conjunctivae normal       Pupils: Pupils are equal, round, and reactive to light  Neck:      Thyroid: No thyromegaly  Cardiovascular:      Rate and Rhythm: Normal rate and regular rhythm  Heart sounds: Normal heart sounds  No murmur heard  Pulmonary:      Effort: Pulmonary effort is normal       Breath sounds: Normal breath sounds  No wheezing or rales     Musculoskeletal:      Cervical back: Normal range of motion and neck supple  Lymphadenopathy:      Cervical: No cervical adenopathy  Skin:     Findings: No erythema  Comments: Acne on the face   Neurological:      General: No focal deficit present  Mental Status: She is alert  Psychiatric:         Mood and Affect: Mood normal             Past Surgical History:   Procedure Laterality Date    [place] ESOPHAGOGASTRODUODENOSCOPY TRANSORAL DIAGNOSTIC N/A 10/24/2018    Procedure: ESOPHAGOGASTRODUODENOSCOPY (EGD); Surgeon: Noman Arias MD;  [facility];   Service: Gastroenterology    WISDOM TOOTH EXTRACTION         Family History   Problem Relation Age of Onset    Depression Mother     Colorectal Cancer Mother 72    Basal cell carcinoma Father     Breast cancer Cousin     Colon cancer Maternal Uncle     Ovarian cancer Neg Hx          Current Outpatient Medications:     buPROPion (WELLBUTRIN XL) 300 mg 24 hr tablet, Take 300 mg by mouth daily, Disp: , Rfl:     citalopram (CeleXA) 20 mg tablet, Take 20 mg by mouth daily at bedtime, Disp: , Rfl:     clindamycin (CLEOCIN T) 1 % external solution, Apply topically 2 (two) times a day, Disp: 30 mL, Rfl: 0    omeprazole (PriLOSEC) 20 mg delayed release capsule, Take 20 mg by mouth daily, Disp: , Rfl:     Allergies   Allergen Reactions    Amoxicillin GI Intolerance       Vitals:    06/28/22 1803   BP: 98/76   BP Location: Left arm   Patient Position: Sitting   Cuff Size: Large   Pulse: 80   Resp: 18   SpO2: 97%   Weight: 78 9 kg (174 lb)   Height: 4' 11 5" (1 511 m)

## 2022-07-11 ENCOUNTER — EVALUATION (OUTPATIENT)
Dept: PHYSICAL THERAPY | Facility: REHABILITATION | Age: 37
End: 2022-07-11
Payer: COMMERCIAL

## 2022-07-11 DIAGNOSIS — M62.89 PELVIC FLOOR TENSION: Primary | ICD-10-CM

## 2022-07-11 DIAGNOSIS — N94.10 DYSPAREUNIA, FEMALE: ICD-10-CM

## 2022-07-11 PROCEDURE — 97162 PT EVAL MOD COMPLEX 30 MIN: CPT | Performed by: PHYSICAL THERAPIST

## 2022-07-11 PROCEDURE — 97530 THERAPEUTIC ACTIVITIES: CPT | Performed by: PHYSICAL THERAPIST

## 2022-07-11 NOTE — PROGRESS NOTES
PT Evaluation     Today's date: 2022  Patient name: Clemencia Jaimes  : 1985  MRN: [id number]  Referring provider: Manuel Levy PA-C  Dx:   Encounter Diagnosis     ICD-10-CM    1  Pelvic floor tension  M62 89    2  Dyspareunia, female  N94 10 Ambulatory Referral to Physical Therapy                  Assessment  Assessment details: Clemencia Jaimes is a 30 y o  female who presents with concerns of poor tolerance to penetration and vaginal examinations and intolerance to tampons  Assessment reveals marked pelvic muscle guarding, poor voltional PFM control and hyperalgesia  Patient presents with the below outlined deficits and is appropriate for skilled physical therapy in order to address deficits and ultimately meet goal of independent self management of condition  Therapeutic activities performed upon examination included education regarding pelvic floor anatomy, explanation of exam technique, explanation of exam findings and discussion of treatment plan as well as expectations of the patient to emphasize the importance of compliance and adherence to physical therapy visits  Impairments: abnormal muscle firing, abnormal muscle tone, abnormal or restricted ROM, activity intolerance, lacks appropriate home exercise program and pain with function  Understanding of Dx/Px/POC: good   Prognosis: good    Goals  STGs to be met in 4 weeks:  * Patient will be compliant with introductory HEP as prescribed  * Patient will report 50% less pain with activities  LTGs to be met by discharge:  * Normalize findings on sEMG to indicate PFM strength average of at least 12uV and resting average < 2 5uV  * Implements relaxation strategies on a daily basis  * Patient will report 80 % reduction in discomfort with either palpation or intimacy  * Patient will be proficient and compliant with use of vaginal dilator (s) for progression of self PFM stretching in 3-4 visits if indicated during treatment      * Patient will be compliant with comprehensive home exercise program for self management of condition  Plan  Patient would benefit from: skilled physical therapy  Referral necessary: No  Planned modality interventions: biofeedback  Planned therapy interventions: manual therapy, neuromuscular re-education, patient education, strengthening, therapeutic activities, therapeutic exercise and home exercise program  Frequency: 1x week  Duration in weeks: 12  Plan of Care beginning date: 7/11/2022  Plan of Care expiration date: 10/3/2022  Treatment plan discussed with: patient        PT Pelvic Floor Subjective:   History of Present Illness:   Patient reports that she is unable to tolerate tampons and reports pain with insertion during intercourse  Also reports bladder dysfunction:  "Since I was a kid, I always had a weak bladder and recently it has gotten worse  I would empty my bladder and have to go again or go to the BR 10 times with the smallest amount of water "   "I have noticed recently I cannot hold my bladder, I often have to stop and cross my legs " She has UUI about 3 times a month  Denies MORENITA           Recurrent probem    Quality of life: good    Social Support:     Lives in:  [address] [place] [address],[address] (4th floor with elevator)    Lives with:  Significant other    Relationship status: domestic partnership    Work status: employed full time and unemployed ( )    Life stress severity: severe (work stress and homelife )  Hand dominance:  Right  Diet and Exercise:      30 minutes of exercise - 5 days/week  Walking and swimming   Co-morbidities:    GERD, PCOS, hiatal hernia  OB/ gyn History    Gestational History:     Prior Pregnancy: No      Menstrual History:    Date of last menstrual period: 6/16/2022    Menstrual irregularities irregular menses    Painful periods:  No difficulty managing menstrual pain    Tolerates tampons: no    Menopausal: no menopause    Birth control method: condoms  Used birth control "for a very long time" however she was bleeding twice a month  She is not taking any form of birth control currently and currently reports that she has her period every other month  Bladder Function:     Voiding Difficulties positive for: urgency      Voiding Difficulties comments:     Voiding frequency: every 1-2 hours    Urinary leakage: urine leakage    Urinary leakage aggravated by: walking to the bathroom    Painful urination: No      Intake (ounces): Water intake (oz): 5 cups  Coffee intake (oz): 1 cup coffee  Bowel Function:     Bowel frequency: daily    Whiting Stool Scale: type 4 and type 3    Stool softener use: no stool softeners  Sexual Function:     Sexually Active:  Sexually active    Pain during intercourse: Yes      Lubrication Use: Yes (doesn't really make much difference)    pain does not cause abstinence    Patient wishes to return to having intercourse: currently unable to have intercourse but wants to  Pain:     Current pain ratin    At best pain ratin    At worst pain ratin    Location:  Vaginal     Onset:  More than 2 years ago    Quality:  Sharp    Aggravating factors:  Roby  Diagnostic Tests:     None    Treatments:     None    Patient Goals:     Patient goals for therapy:  Improved pain management, improved quality of life, return to sport/leisure activities, improved comfort, improved bladder or bowel function and decreased pain      Objective   Pelvic Floor Exam     General Perineum Exam:   perineum intact  Positive for swelling  General perineum exam comments: Difficulty visualizing vestibule due to patient guarding  She flinched with light touch with cotton swab and reported marked discomfort along 6 o'clock introitus rating 7/10  I was unable to palpate beyond Layer 1  I asked her to engage her pelvic floor and was able to palpate a weak contraction with no active lift and no volitional relaxation   Given her discomfort, she would likely benefit from external biofeedback for neuromuscular re-education       Visual Inspection of Perineum:   Excursion of perineal body in cephalad direction with contraction of pelvic floor muscles (PFM): weak  Excursion of perineal body in caudal direction with relaxation of pelvic floor muscles (PFM): unable  Involuntary contraction with coughing: yes  Involuntary relaxation with bearing down: no  Cotton swab test: tender  Sphincter Tone Resting: increased  Sensation: intact    SMEG Biofeedback   to be assessed next treatment               Precautions: GERD, PCOS, depression, anxiety, LBP      Manuals 7/11            PFM release                                                    Neuro Re-Ed             biofeedback nv                                                                                          Ther Ex                                                                                                                     Ther Activity                                       Gait Training                                       Modalities

## 2022-07-18 ENCOUNTER — OFFICE VISIT (OUTPATIENT)
Dept: PHYSICAL THERAPY | Facility: REHABILITATION | Age: 37
End: 2022-07-18
Payer: COMMERCIAL

## 2022-07-18 DIAGNOSIS — N94.10 DYSPAREUNIA, FEMALE: Primary | ICD-10-CM

## 2022-07-18 DIAGNOSIS — M62.89 PELVIC FLOOR TENSION: ICD-10-CM

## 2022-07-18 PROCEDURE — 97530 THERAPEUTIC ACTIVITIES: CPT | Performed by: PHYSICAL THERAPIST

## 2022-07-18 PROCEDURE — 97110 THERAPEUTIC EXERCISES: CPT | Performed by: PHYSICAL THERAPIST

## 2022-07-18 PROCEDURE — 97140 MANUAL THERAPY 1/> REGIONS: CPT | Performed by: PHYSICAL THERAPIST

## 2022-07-18 NOTE — PROGRESS NOTES
Daily Note     Today's date: 2022  Patient name: Neri Gomez  : 1985  MRN: [id number]  Referring provider: Christiano Harding PA-C  Dx:   Encounter Diagnosis     ICD-10-CM    1  Dyspareunia, female  N94 10    2  Pelvic floor tension  M62 89      Patient reports that she is unable to tolerate tampons and reports pain with insertion during intercourse  Also reports bladder dysfunction:  "Since I was a kid, I always had a weak bladder and recently it has gotten worse  I would empty my bladder and have to go again or go to the BR 10 times with the smallest amount of water "   "I have noticed recently I cannot hold my bladder, I often have to stop and cross my legs " She has UUI about 3 times a month  Denies MORENITA  Subjective: Patient offers no complaints after IE  Mildly sore after lv  Objective: See treatment diary below    Access Code: [email or web address]  URL: [email or web address]  Date: 2022  Prepared by: Agatha Laws    Exercises  · Child's Pose Stretch - 1 x daily - 7 x weekly - 3 reps - 1 minute hold  · Supine Pelvic Floor Stretch - 1 x daily - 7 x weekly - 3 reps - 1 minute hold  · Supine Hamstring Stretch with Strap - 1 x daily - 7 x weekly - 3 reps - 20 hold            Assessment: Tolerated treatment well  Patient would benefit from continued PT   Did well with self stretches and gentle external desensitization with direct applied pressure at 6 o'clock  Less flinching than past visits  Plan: Continue per plan of care        Precautions: GERD, PCOS, depression, anxiety, LBP      Manuals            PFM release  external [id number]'           Adductor release  B 20'                                     Neuro Re-Ed             biofeedback nv nv                                                                                         Ther Ex             nustep  L3x8'           Happy baby  x3           cyrus pose  x3           HS stretch with strap x3 each                                                               Ther Activity             education  anatomy and desensitizing techniqeus x10'                        Gait Training                                       Modalities

## 2022-07-24 NOTE — PROGRESS NOTES
Daily Note     Today's date: 2022  Patient name: Clemencia Jaimes  : 1985  MRN: [id number]  Referring provider: Manuel Levy PA-C  Dx:   Encounter Diagnosis     ICD-10-CM    1  Dyspareunia, female  N94 10    2  Pelvic floor tension  M62 89      Patient reports that she is unable to tolerate tampons and reports pain with insertion during intercourse  Also reports bladder dysfunction:  "Since I was a kid, I always had a weak bladder and recently it has gotten worse  I would empty my bladder and have to go again or go to the BR 10 times with the smallest amount of water "   "I have noticed recently I cannot hold my bladder, I often have to stop and cross my legs " She has UUI about 3 times a month  Denies MORENITA  Subjective: Patient reports that she has been doing her stretches and had some hip and back discomfort due to tightness  Objective: See treatment diary below    Access Code: [email or web address]  URL: [email or web address]  Date: 2022  Prepared by: Frandy Ball    Exercises  · Child's Pose Stretch - 1 x daily - 7 x weekly - 3 reps - 1 minute hold  · Supine Pelvic Floor Stretch - 1 x daily - 7 x weekly - 3 reps - 1 minute hold  · Supine Hamstring Stretch with Strap - 1 x daily - 7 x weekly - 3 reps - 20 hold    Assessment: Tolerated treatment well  Patient would benefit from continued PT   Was able to palpate layer 1-2 this session and apply pressure for mild stretching  Patient did well with instruction to focus on breathing strategies for quieting ANS and conversation for distraction  She may benefit from progressive muscle relaxation in the future  Plan: Continue per plan of care   NV - DB instruction and introduce/educate about trainers     Precautions: GERD, PCOS, depression, anxiety, LBP      Manuals           external desensitazation  x10' 10'          Adductor release  B 20' B 15'            PFM release   10'          Hip stretches   10' Neuro Re-Ed             biofeedback nv nv                                                                                         Ther Ex             nustep  L3x8' 8'          Happy baby  x3 R          cyrus pose  x3 R          HS stretch with strap  x3 each R                                                              Ther Activity             education  anatomy and desensitizing techniqeus x10'                        Gait Training                                       Modalities

## 2022-07-25 ENCOUNTER — OFFICE VISIT (OUTPATIENT)
Dept: PHYSICAL THERAPY | Facility: REHABILITATION | Age: 37
End: 2022-07-25
Payer: COMMERCIAL

## 2022-07-25 DIAGNOSIS — N94.10 DYSPAREUNIA, FEMALE: Primary | ICD-10-CM

## 2022-07-25 DIAGNOSIS — M62.89 PELVIC FLOOR TENSION: ICD-10-CM

## 2022-07-25 PROCEDURE — 97140 MANUAL THERAPY 1/> REGIONS: CPT | Performed by: PHYSICAL THERAPIST

## 2022-07-25 PROCEDURE — 97110 THERAPEUTIC EXERCISES: CPT | Performed by: PHYSICAL THERAPIST

## 2022-08-01 ENCOUNTER — OFFICE VISIT (OUTPATIENT)
Dept: PHYSICAL THERAPY | Facility: REHABILITATION | Age: 37
End: 2022-08-01
Payer: COMMERCIAL

## 2022-08-01 DIAGNOSIS — M62.89 PELVIC FLOOR TENSION: ICD-10-CM

## 2022-08-01 DIAGNOSIS — N94.10 DYSPAREUNIA, FEMALE: Primary | ICD-10-CM

## 2022-08-01 PROCEDURE — 97110 THERAPEUTIC EXERCISES: CPT | Performed by: PHYSICAL THERAPIST

## 2022-08-01 PROCEDURE — 97530 THERAPEUTIC ACTIVITIES: CPT | Performed by: PHYSICAL THERAPIST

## 2022-08-01 PROCEDURE — 97140 MANUAL THERAPY 1/> REGIONS: CPT | Performed by: PHYSICAL THERAPIST

## 2022-08-01 NOTE — PROGRESS NOTES
Daily Note     Today's date: 2022  Patient name: Reymundo Tillman  : 1985  MRN: [id number]  Referring provider: Zara Sapp PA-C  Dx:   Encounter Diagnosis     ICD-10-CM    1  Dyspareunia, female  N94 10    2  Pelvic floor tension  M62 89      Patient reports that she is unable to tolerate tampons and reports pain with insertion during intercourse  Also reports bladder dysfunction:  "Since I was a kid, I always had a weak bladder and recently it has gotten worse  I would empty my bladder and have to go again or go to the BR 10 times with the smallest amount of water "   "I have noticed recently I cannot hold my bladder, I often have to stop and cross my legs " She has UUI about 3 times a month  Denies MORENITA  Subjective: Patient reports minimal pain after last session with mostly manual focus  Objective: See treatment diary below    Access Code: [email or web address]  URL: [email or web address]  Date: 2022  Prepared by: Ely Flatter    Exercises  · Child's Pose Stretch - 1 x daily - 7 x weekly - 3 reps - 1 minute hold  · Supine Pelvic Floor Stretch - 1 x daily - 7 x weekly - 3 reps - 1 minute hold  · Supine Hamstring Stretch with Strap - 1 x daily - 7 x weekly - 3 reps - 20 hold    Access Code: [email or web address]  URL: [email or web address]  Date: 2022  Prepared by: Ely Flatter    Exercises  · Side Lunge Adductor Stretch - 1 x daily - 7 x weekly - 1 sets - 10 reps - 5 seconds hold          Assessment: Tolerated treatment well  Patient would benefit from continued PT   Less tolerance to palpation this session along Layer 1 with pain rating 5/10 on R, 4/10 at 6 o'clock, 6/10 on L  This reduced to 3/10 with gentle breathing focus along 6 o'clock but otherwise she remained TTP  We spent time discussing pros and cons vaginal dilators with ordering information provided to patient  Also encouraged DB for ANS quieting due to a tendency for patient to hold tension  Plan: Continue per plan of care        Precautions: GERD, PCOS, depression, anxiety, LBP      Manuals 7/11 7/18 7/25 8/1         external desensitazation  x10' 10' 10'         Adductor release  B 20' B 15'   8'         PFM release   10' 10'         Hip stretches   10' 10'         Neuro Re-Ed             biofeedback nv nv                                                                                         Ther Ex             nustep  L3x8' 8' 10'         Happy baby  x3 R          cyrus pose  x3 R          HS stretch with strap  x3 each R          Standing adductor stretches    issued for HEP                                                Ther Activity             education  anatomy and desensitizing techniqeus x10'  dilator/lube info x10'                      Gait Training                                       Modalities

## 2022-08-08 ENCOUNTER — APPOINTMENT (OUTPATIENT)
Dept: PHYSICAL THERAPY | Facility: REHABILITATION | Age: 37
End: 2022-08-08
Payer: COMMERCIAL

## 2022-08-15 ENCOUNTER — OFFICE VISIT (OUTPATIENT)
Dept: PHYSICAL THERAPY | Facility: REHABILITATION | Age: 37
End: 2022-08-15
Payer: COMMERCIAL

## 2022-08-15 DIAGNOSIS — N94.10 DYSPAREUNIA, FEMALE: Primary | ICD-10-CM

## 2022-08-15 DIAGNOSIS — M62.89 PELVIC FLOOR TENSION: ICD-10-CM

## 2022-08-15 PROCEDURE — 97530 THERAPEUTIC ACTIVITIES: CPT | Performed by: PHYSICAL THERAPIST

## 2022-08-15 PROCEDURE — 97110 THERAPEUTIC EXERCISES: CPT | Performed by: PHYSICAL THERAPIST

## 2022-08-15 PROCEDURE — 97140 MANUAL THERAPY 1/> REGIONS: CPT | Performed by: PHYSICAL THERAPIST

## 2022-08-15 NOTE — PROGRESS NOTES
Daily Note     Today's date: 8/15/2022  Patient name: Mckenna Talbert  : 1985  MRN: [id number]  Referring provider: Yuli Lockhart PA-C  Dx:   Encounter Diagnosis     ICD-10-CM    1  Dyspareunia, female  N94 10    2  Pelvic floor tension  M62 89      Patient reports that she is unable to tolerate tampons and reports pain with insertion during intercourse  Also reports bladder dysfunction:  "Since I was a kid, I always had a weak bladder and recently it has gotten worse  I would empty my bladder and have to go again or go to the BR 10 times with the smallest amount of water "   "I have noticed recently I cannot hold my bladder, I often have to stop and cross my legs " She has UUI about 3 times a month  Denies MORENITA  Subjective: Patient reports that she can hold bladder for about 5 minutes at this point  Her voiding frequency is 1-2 hours at this point  No pain with urination  One episode of bladder leakage with urgency recently  Started menses today and she rates abdominal cramping 4/10  She has not ordered vaginal dilators yet  Objective: See treatment diary below    Access Code: [email or web address]  URL: [email or web address]  Date: 08/15/2022  Prepared by: Adrian Vega    Exercises  · Supine March - 1 x daily - 7 x weekly  · Supine Hip Adduction Isometric with Ball - 1 x daily - 7 x weekly          Assessment: Tolerated treatment well  Patient would benefit from continued PT   Deferred internal work this session due to menses  We discussed fluid intake and bladder strategies for deferral  Added co-contractions with handouts provided today to increase PFM holding endurance  Plan: Continue per plan of care        Precautions: GERD, PCOS, depression, anxiety, LBP      Manuals 7/11 7/18 7/25 8/1 8/15        external desensitazation  x10' 10' 10'         Adductor release  B 20' B 15'   8' 10'        PFM release   10' 10'         Hip stretches   10' 10' 15'        Neuro Re-Ed biofeedback nv nv                                                                                         Ther Ex             nustep  L3x8' 8' 10' 10'        Happy baby  x3 R          cyrus pose  x3 R          HS stretch with strap  x3 each R          Standing adductor stretches    issued for HEP         PFM + marches     3x30"        PFM + adduction     15x5"                     Ther Activity             education  anatomy and desensitizing techniqeus x10'  dilator/lube info x10' bladder [id number]'                     Gait Training                                       Modalities

## 2022-08-22 ENCOUNTER — OFFICE VISIT (OUTPATIENT)
Dept: PHYSICAL THERAPY | Facility: REHABILITATION | Age: 37
End: 2022-08-22
Payer: COMMERCIAL

## 2022-08-22 DIAGNOSIS — N94.10 DYSPAREUNIA, FEMALE: Primary | ICD-10-CM

## 2022-08-22 DIAGNOSIS — M62.89 PELVIC FLOOR TENSION: ICD-10-CM

## 2022-08-22 PROCEDURE — 97140 MANUAL THERAPY 1/> REGIONS: CPT

## 2022-08-22 PROCEDURE — 97530 THERAPEUTIC ACTIVITIES: CPT

## 2022-08-22 PROCEDURE — 97112 NEUROMUSCULAR REEDUCATION: CPT

## 2022-08-22 PROCEDURE — 97110 THERAPEUTIC EXERCISES: CPT

## 2022-08-22 NOTE — PROGRESS NOTES
Daily Note     Today's date: 2022  Patient name: Nati Avery  : 1985  MRN: [id number]  Referring provider: Paige Corcoran PA-C  Dx:   Encounter Diagnosis     ICD-10-CM    1  Dyspareunia, female  N94 10    2  Pelvic floor tension  M62 89      Patient reports that she is unable to tolerate tampons and reports pain with insertion during intercourse  Also reports bladder dysfunction:  "Since I was a kid, I always had a weak bladder and recently it has gotten worse  I would empty my bladder and have to go again or go to the BR 10 times with the smallest amount of water "   "I have noticed recently I cannot hold my bladder, I often have to stop and cross my legs " She has UUI about 3 times a month  Denies MORENITA  Start Time:   Stop Time:   Total time in clinic (min): 60 minutes    Subjective: Pt feels she has made some gains with with urgency, unable to rate but "its better"  She has not ordered vaginal dilators yet  Is currently having intercourse but its painful, more so with the initial penetration  Objective: See treatment diary below    Access Code: [email or web address]  URL: [email or web address]  Date: 08/15/2022  Prepared by: Amisha Mao    Exercises  · Supine March - 1 x daily - 7 x weekly  · Supine Hip Adduction Isometric with Ball - 1 x daily - 7 x weekly    Assessment: Tolerated treatment well  Patient would benefit from continued PT   SEMG revealed decreased work and endurance  When cued to engage, pt passed gas  Reviewed with tactile cueing how to properly engage her PFM  Even with cueing continues to be challenged but appears to improve with practice  Reviewed the benefits of trainers, the possibility of PFM stretching with her partner before attempting intercourse, urge deferral technique and given a handout on different sexual positions  Plan: Continue per plan of care        Precautions: GERD, PCOS, depression, anxiety, LBP      Manuals 7/11 7/18 7/25 8/1 8/15 8/22       external desensitazation  x10' 10' 10'  5'       Adductor release  B 20' B 15'   8' 10'        PFM release   10' 10'  10'       Hip stretches   10' 10' 15'        Neuro Re-Ed             biofeedback nv nv    25                                                                                     Ther Ex             nustep  L3x8' 8' 10' 10' 10       Happy baby  x3 R          cyrus pose  x3 R          HS stretch with strap  x3 each R          Standing adductor stretches    issued for HEP         PFM + marches     3x30"        PFM + adduction     15x5"                     Ther Activity             education  anatomy and desensitizing techniqeus x10'  dilator/lube info x10' bladder [id number]' dilators, sexual positions, partner stretching, come as you are book, urge deferral technique 10'                    Gait Training                                       Modalities

## 2022-08-29 ENCOUNTER — OFFICE VISIT (OUTPATIENT)
Dept: PHYSICAL THERAPY | Facility: REHABILITATION | Age: 37
End: 2022-08-29
Payer: COMMERCIAL

## 2022-08-29 DIAGNOSIS — N94.10 DYSPAREUNIA, FEMALE: Primary | ICD-10-CM

## 2022-08-29 DIAGNOSIS — M62.89 PELVIC FLOOR TENSION: ICD-10-CM

## 2022-08-29 PROCEDURE — 97110 THERAPEUTIC EXERCISES: CPT

## 2022-08-29 PROCEDURE — 97140 MANUAL THERAPY 1/> REGIONS: CPT

## 2022-08-29 NOTE — PROGRESS NOTES
Daily Note     Today's date: 2022  Patient name: Reymundo Tillman  : 1985  MRN: [id number]  Referring provider: Zara Sapp PA-C  Dx:   Encounter Diagnosis     ICD-10-CM    1  Dyspareunia, female  N94 10    2  Pelvic floor tension  M62 89      Patient reports that she is unable to tolerate tampons and reports pain with insertion during intercourse  Also reports bladder dysfunction:  "Since I was a kid, I always had a weak bladder and recently it has gotten worse  I would empty my bladder and have to go again or go to the BR 10 times with the smallest amount of water "   "I have noticed recently I cannot hold my bladder, I often have to stop and cross my legs " She has UUI about 3 times a month  Denies MORENITA  Start Time:   Stop Time: 65  Total time in clinic (min): 35 minutes    Subjective: Pt reports having a yeast overthecounter cream - easier the applicator with the cream than in the past  Urge deferal technique was successful today  Too busy to try partner training but did order the dilators  Objective: See treatment diary below    Access Code: [email or web address]  URL: [email or web address]  Date: 08/15/2022  Prepared by: Ely Flatter    Exercises  · Supine March - 1 x daily - 7 x weekly  · Supine Hip Adduction Isometric with Ball - 1 x daily - 7 x weekly    Assessment: Tolerated treatment well  Patient would benefit from continued PT   Due to possible yeast and irritation flare up deferred PFM release for today  Proximal adductors tender, L > R      Plan: Continue per plan of care  I will send her a suggestion to possibly prevent yeast infections       Precautions: GERD, PCOS, depression, anxiety, LBP      Manuals 7/11 7/18 7/25 8/1 8/15 8/22 8/29      external desensitazation  x10' 10' 10'  5'       Adductor release  B 20' B 15'   8' 10'  15' MFR and skin rolling      PFM release   10' 10'  10'       Hip stretches   10' 10' 15'  10'      Neuro Re-Ed             biofeedback nv nv    25'                                                                                     Ther Ex             nustep  L3x8' 8' 10' 10' 10 10'      Happy baby  x3 R          cyrus pose  x3 R          HS stretch with strap  x3 each R          Standing adductor stretches    issued for HEP         PFM + marches     3x30"        PFM + adduction     15x5"                     Ther Activity             education  anatomy and desensitizing techniqeus x10'  dilator/lube info x10' bladder [id number]' dilators, sexual positions, partner stretching, come as you are book, urge deferral technique 10'                    Gait Training                                       Modalities

## 2022-09-08 ENCOUNTER — OFFICE VISIT (OUTPATIENT)
Dept: PHYSICAL THERAPY | Facility: REHABILITATION | Age: 37
End: 2022-09-08
Payer: COMMERCIAL

## 2022-09-08 DIAGNOSIS — N94.10 DYSPAREUNIA, FEMALE: Primary | ICD-10-CM

## 2022-09-08 DIAGNOSIS — M62.89 PELVIC FLOOR TENSION: ICD-10-CM

## 2022-09-08 PROCEDURE — 97110 THERAPEUTIC EXERCISES: CPT

## 2022-09-08 PROCEDURE — 97112 NEUROMUSCULAR REEDUCATION: CPT

## 2022-09-08 PROCEDURE — 97140 MANUAL THERAPY 1/> REGIONS: CPT

## 2022-09-08 NOTE — PROGRESS NOTES
Daily Note     Today's date: 2022  Patient name: Prashant Woodruff  : 1985  MRN: [id number]  Referring provider: Rashi Mark PA-C  Dx:   Encounter Diagnosis     ICD-10-CM    1  Dyspareunia, female  N94 10    2  Pelvic floor tension  M62 89      Patient reports that she is unable to tolerate tampons and reports pain with insertion during intercourse  Also reports bladder dysfunction:  "Since I was a kid, I always had a weak bladder and recently it has gotten worse  I would empty my bladder and have to go again or go to the BR 10 times with the smallest amount of water "   "I have noticed recently I cannot hold my bladder, I often have to stop and cross my legs " She has UUI about 3 times a month  Denies MORENITA  Start Time:   Stop Time:   Total time in clinic (min): 43 minutes    Subjective: Pt reports her yeast infection is resolved and did receive her dilators  Objective: See treatment diary below    HEP 22 dilator training handout    Access Code: [email or web address]  URL: [email or web address]  Date: 08/15/2022  Prepared by: Santhosh Brandon    Exercises  · Supine March - 1 x daily - 7 x weekly  · Supine Hip Adduction Isometric with Ball - 1 x daily - 7 x weekly    Assessment: Tolerated treatment well  Patient would benefit from continued PT   Initially I performed some STM and identified area which are more tender  Pt was able to reproduce some of the discomfort, more on her L side with dilator no  1  Reviewed how to properly progress, different positions, importance of being in a good head space, possibly listening to music or a meditation  Pt feels less overwhelmed by the end of the session  Plan: Continue per plan of care         Precautions: GERD, PCOS, depression, anxiety, LBP      Manuals 7/11 7/18 7/25 8/1 8/15 8/22 8/29 9/8     external desensitazation  x10' 10' 10'  5'       Adductor release  B 20' B 15'   8' 10'  15' MFR and skin rolling      PFM release   10' 10' 10'  20'     Hip stretches   10' 10' 15'  10'      Neuro Re-Ed             biofeedback nv nv    25'       Dilator training        15'                                                                      Ther Ex             nustep  L3x8' 8' 10' 10' 10 10' 8'     Happy baby  x3 R          cyrus pose  x3 R          HS stretch with strap  x3 each R          Standing adductor stretches    issued for HEP         PFM + marches     3x30"        PFM + adduction     15x5"                     Ther Activity             education  anatomy and desensitizing techniqeus x10'  dilator/lube info x10' bladder [id number]' dilators, sexual positions, partner stretching, come as you are book, urge deferral technique 10'  dilator handouts                  Gait Training                                       Modalities

## 2022-09-15 ENCOUNTER — APPOINTMENT (OUTPATIENT)
Dept: PHYSICAL THERAPY | Facility: REHABILITATION | Age: 37
End: 2022-09-15
Payer: COMMERCIAL

## 2022-09-20 ENCOUNTER — APPOINTMENT (OUTPATIENT)
Dept: PHYSICAL THERAPY | Facility: REHABILITATION | Age: 37
End: 2022-09-20
Payer: COMMERCIAL

## 2022-09-27 ENCOUNTER — OFFICE VISIT (OUTPATIENT)
Dept: PHYSICAL THERAPY | Facility: REHABILITATION | Age: 37
End: 2022-09-27
Payer: COMMERCIAL

## 2022-09-27 DIAGNOSIS — N94.10 DYSPAREUNIA, FEMALE: Primary | ICD-10-CM

## 2022-09-27 DIAGNOSIS — M62.89 PELVIC FLOOR TENSION: ICD-10-CM

## 2022-09-27 PROCEDURE — 97164 PT RE-EVAL EST PLAN CARE: CPT | Performed by: PHYSICAL THERAPIST

## 2022-09-27 PROCEDURE — 97110 THERAPEUTIC EXERCISES: CPT | Performed by: PHYSICAL THERAPIST

## 2022-09-27 PROCEDURE — 97140 MANUAL THERAPY 1/> REGIONS: CPT | Performed by: PHYSICAL THERAPIST

## 2022-09-27 NOTE — PROGRESS NOTES
PT Re-Evaluation     Today's date: 2022  Patient name: Aimee Michel  : 1985  MRN: [id number]  Referring provider: Alis Reyna PA-C  Dx:   Encounter Diagnosis     ICD-10-CM    1  Dyspareunia, female  N94 10    2  Pelvic floor tension  M62 89        Start Time: 1705  Stop Time: 1800  Total time in clinic (min): 55 minutes    Assessment  Assessment details: Rick Faye presents with improved pelvic floor muscle tone, improved tolerance to examination and palpation and improved PFM strength  Her bladder urgency symptoms have markedly improved and she is pleased with progress  Antwan continues with pain and tenderness along all 3 layers of the pelvic floor and her progress has been slow but in an upward trend  She has not met her goals of being able to tolerate tampons or pain free intercourse  Impairments: abnormal muscle tone, abnormal or restricted ROM, activity intolerance and pain with function  Understanding of Dx/Px/POC: good   Prognosis: good    Goals  STGs to be met in 4 weeks:  * Patient will be compliant with introductory HEP as prescribed  MET  * Patient will report 50% less pain with activities  MET    LTGs to be met by discharge:  * Normalize findings on sEMG to indicate PFM strength average of at least 12uV and resting average < 2 5uV  TBD  * Implements relaxation strategies on a daily basis  ONGOING  * Patient will report 80 % reduction in discomfort with either palpation or intimacy  NOT MET  * Patient will be proficient and compliant with use of vaginal dilator (s) for progression of self PFM stretching in 3-4 visits if indicated during treatment  ONGOING  * Patient will be compliant with comprehensive home exercise program for self management of condition   ONGOING      Plan  Patient would benefit from: skilled physical therapy  Referral necessary: No  Planned modality interventions: biofeedback  Planned therapy interventions: manual therapy, neuromuscular re-education, patient education, strengthening, therapeutic activities, therapeutic exercise and home exercise program  Frequency: 1x week  Duration in weeks: 12  Plan of Care beginning date: 9/27/2022  Plan of Care expiration date: 12/20/2022  Treatment plan discussed with: patient        PT Pelvic Floor Subjective:   History of Present Illness:   Currently reports that her bladder frequency and urgency is much better and she has implemented urge suppression strategies  She has not had any episodes of urinary leakage since [facility]  She reports that her pain with insertion during intercourse has improved  She feels as though her pelvic floor is more stretched and not quite as tight  She has been using a small vaginal dilator once a week for self stretching and desensitization  She was sick past 2 weeks and was non-compliant with home exercises as a result  Recurrent probem    Quality of life: good    Social Support:     Lives in:  [address] [facility] [place] (4th floor with elevator)    Lives with:  Significant other    Relationship status: domestic partnership    Work status: employed full time ( )    Life stress severity: moderate (work stress)  Hand dominance:  Right  Diet and Exercise:      None regularly  Co-morbidities:    GERD, PCOS, hiatal hernia  OB/ gyn History    Gestational History:     Prior Pregnancy: No      Menstrual History:    Date of last menstrual period: 9/18/2022    Menstrual irregularities regular menses    Painful periods:  No difficulty managing menstrual pain    Tolerates tampons: no    Menopausal: no menopause    Birth control method: condoms      Bladder Function:     Voiding Difficulties positive for: urgency      Voiding Difficulties comments:     Voiding frequency: every 1-2 hours    Urinary leakage: no urine leakage    Urinary leakage not aggravated by: walking to the bathroom    Painful urination: No      Intake (ounces): Water intake (oz): 5 cups  Coffee intake (oz): 1 cup coffee     Bowel Function: Bowel frequency: daily    King Stool Scale: type 4 and type 3    Stool softener use: no stool softeners  Sexual Function:     Sexually Active:  Sexually active    Pain during intercourse: Yes      Lubrication Use: Yes (doesn't really make much difference)    pain does not cause abstinence    Patient wishes to return to having intercourse: currently unable to have intercourse but wants to  Pain:     Current pain ratin    At best pain ratin    At worst pain ratin    Location:  Vaginal     Onset:  More than 2 years ago    Quality:  Sharp    Aggravating factors:  [place]  Diagnostic Tests:     None    Treatments:     None    Patient Goals:     Patient goals for therapy:  Improved pain management, improved comfort, improved bladder or bowel function and decreased pain      Objective   Pelvic Floor Exam     General Perineum Exam:   perineum intact  General perineum exam comments: No discharge, irritation, organ prolapse or skin breakdown evident    Moderate guarding still evident with palpation  Visual Inspection of Perineum:   Excursion of perineal body in cephalad direction with contraction of pelvic floor muscles (PFM): fair   Excursion of perineal body in caudal direction with relaxation of pelvic floor muscles (PFM): fair   Involuntary contraction with coughing: yes  Involuntary relaxation with bearing down: yes  Sphincter Tone Resting: increased  Sensation: intact    Pelvic Floor Muscle Exam     Palpation   Minimal increased muscle tension in the super transverse perineal  Moderate increased muscle tension in the super transverse perineal and pubococcygeus  Moderate tenderness on right in the super transverse perineal and pubococcygeus  Moderate tenderness on left in the super transverse perineal and pubococcygeus    Pelvic floor muscle relaxation is incomplete     50% pelvic floor relaxation    PERFECT Score   Power right: 3/5  Power left: 3/5  Endurance (seconds to max): 4  Repetitions (before fatigue): 4               Precautions: GERD, PCOS, depression, anxiety, LBP        Manuals 7/11 7/18 7/25 8/1 8/15 8/22 8/29 9/8 9/27    external desensitazation  x10' 10' 10'  5'   10'    Adductor release  B 20' B 15'   8' 10'  15' MFR and skin rolling      PFM release   10' 10'  10'  20' 20'    Hip stretches   10' 10' 15'  10'  15'    Neuro Re-Ed             biofeedback nv nv    25'       Dilator training        15'                                                                      Ther Ex             nustep  L3x8' 8' 10' 10' 10 10' 8' 10'    Happy baby  x3 R          cyrus pose  x3 R          HS stretch with strap  x3 each R          Standing adductor stretches    issued for HEP         PFM + marches     3x30"        PFM + adduction     15x5"                     Ther Activity             education  anatomy and desensitizing techniqeus x10'  dilator/lube info x10' bladder [id number]' dilators, sexual positions, partner stretching, come as you are book, urge deferral technique 10'  dilator handouts                  Gait Training                                       Modalities

## 2022-10-03 ENCOUNTER — OFFICE VISIT (OUTPATIENT)
Dept: PHYSICAL THERAPY | Facility: REHABILITATION | Age: 37
End: 2022-10-03
Payer: COMMERCIAL

## 2022-10-03 DIAGNOSIS — M62.89 PELVIC FLOOR TENSION: ICD-10-CM

## 2022-10-03 DIAGNOSIS — N94.10 DYSPAREUNIA, FEMALE: Primary | ICD-10-CM

## 2022-10-03 PROCEDURE — 97112 NEUROMUSCULAR REEDUCATION: CPT

## 2022-10-03 PROCEDURE — 97110 THERAPEUTIC EXERCISES: CPT

## 2022-10-03 PROCEDURE — 97140 MANUAL THERAPY 1/> REGIONS: CPT

## 2022-10-03 NOTE — PROGRESS NOTES
Daily Note     Today's date: 10/3/2022  Patient name: Ángel Fournier  : 1985  MRN: [id number]  Referring provider: Rubén Lunsford PA-C  Dx:   Encounter Diagnosis     ICD-10-CM    1  Dyspareunia, female  N94 10    2  Pelvic floor tension  M62 89        Start Time: 1800  Stop Time: 1848  Total time in clinic (min): 48 minutes    Subjective: Pt would like to do more PFM stretching since it was more tender  Objective: See treatment diary below    Surface EMG biofeedback was used to augment coordination and rest tone and was performed in hooklying position  Patient presents with 3 0uV resting activity level at baseline   Set#2: Performed 5 sec active PFM contractions followed by 10 sec of rest for 10 repetitions  Average muscle activity during work phase measured 11 6 uV, with the goal being > 12 0uV; average muscle activity during rest phase measured 3 9 uV with the goal being < 2 5uV   Set#3: Performed 10 sec active PFM contractions followed by 10 sec of rest for 10 repetitions  Average muscle activity during work phase measured 13 3uV, with the goal being > 12 0uV; average muscle activity during rest phase measured 3  8uV with the goal being < 2 5uV  Assessment: Tolerated treatment well  Patient would benefit from continued PT   Improved performance with SEMG with the ability to relax and contract  Discussed trialing no  2 dilator, reminded her she's in charge and should use no  1 before and after  Discussed different positions which may improve her tolerance  Plan: Continue per plan of care        Precautions: GERD, PCOS, depression, anxiety, LBP        Manuals 7/11 7/18 7/25 8/1 8/15 8/22 8/29 9/8 9/27 10/3   external desensitazation  x10' 10' 10'  5'   10'    Adductor release  B 20' B 15'   8' 10'  15' MFR and skin rolling      PFM release   10' 10'  10'  20' 20' 15'   Hip stretches   10' 10' 15'  10'  15'    Neuro Re-Ed             biofeedback nv nv    25'    23'   Dilator training 15'                                                                      Ther Ex             nustep  L3x8' 8' 10' 10' 10 10' 8' 10' 10   Happy baby  x3 R          cyrus pose  x3 R          HS stretch with strap  x3 each R          Standing adductor stretches    issued for HEP         PFM + marches     3x30"        PFM + adduction     15x5"                     Ther Activity             education  anatomy and desensitizing techniqeus x10'  dilator/lube info x10' bladder [id number]' dilators, sexual positions, partner stretching, come as you are book, urge deferral technique 10'  dilator handouts                  Gait Training                                       Modalities

## 2022-10-10 ENCOUNTER — OFFICE VISIT (OUTPATIENT)
Dept: PHYSICAL THERAPY | Facility: REHABILITATION | Age: 37
End: 2022-10-10
Payer: COMMERCIAL

## 2022-10-10 DIAGNOSIS — M62.89 PELVIC FLOOR TENSION: ICD-10-CM

## 2022-10-10 DIAGNOSIS — N94.10 DYSPAREUNIA, FEMALE: Primary | ICD-10-CM

## 2022-10-10 PROCEDURE — 97110 THERAPEUTIC EXERCISES: CPT

## 2022-10-10 PROCEDURE — 97112 NEUROMUSCULAR REEDUCATION: CPT

## 2022-10-10 PROCEDURE — 97140 MANUAL THERAPY 1/> REGIONS: CPT

## 2022-10-10 NOTE — PROGRESS NOTES
Daily Note     Today's date: 10/10/2022  Patient name: Aayush Rodriguez  : 1985  MRN: [id number]  Referring provider: Adriel Goodson PA-C  Dx:   Encounter Diagnosis     ICD-10-CM    1  Dyspareunia, female  N94 10    2  Pelvic floor tension  M62 89        Start Time: 1545  Stop Time: 1645  Total time in clinic (min): 60 minutes    Subjective: Pt hit her L small toe on her boyfriend's shoe and feels she may have broken it       Objective: See treatment diary below    Assessment: Tolerated treatment well  Patient would benefit from continued PT   Pt's gait pattern affected due to L foot pain, nancy taped it at the end of the session  Remains somewhat guarded with manual techniques, does better with focus on her breathing  Reviewed bladder irritants due to recent accident  STGs to be met in 4 weeks:  * Patient will be compliant with introductory HEP as prescribed  MET  * Patient will report 50% less pain with activities  MET    LTGs to be met by discharge:  * Normalize findings on sEMG to indicate PFM strength average of at least 12uV and resting average < 2 5uV  TBD  * Implements relaxation strategies on a daily basis  ONGOING  * Patient will report 80 % reduction in discomfort with either palpation or intimacy  NOT MET  * Patient will be proficient and compliant with use of vaginal dilator (s) for progression of self PFM stretching in 3-4 visits if indicated during treatment  ONGOING  * Patient will be compliant with comprehensive home exercise program for self management of condition  ONGOING    Plan: Continue per plan of care        Precautions: GERD, PCOS, depression, anxiety, LBP        Manuals 7/11 7/18 7/25 8/1 8/15 8/22 8/29 9/8 9/27 10/3 10/10   external desensitazation  x10' 10' 10'  5'   10'     Adductor release  B 20' B 15'   8' 10'  15' MFR and skin rolling    10'    PFM release   10' 10'  10'  20' 20' 15' 15'   Hip stretches   10' 10' 15'  10'  15'  15'   Neuro Re-Ed biofeedback nv nv    25'    23'    Dilator training        15'      DB           10'                                                           Ther Ex              nustep  L3x8' 8' 10' 10' 10 10' 8' 10' 10 10   Happy baby  x3 R           cyrus pose  x3 R           HS stretch with strap  x3 each R           Standing adductor stretches    issued for HEP          PFM + marches     3x30"         PFM + adduction     15x5"                       Ther Activity              education  anatomy and desensitizing techniqeus x10'  dilator/lube info x10' bladder [id number]' dilators, sexual positions, partner stretching, come as you are book, urge deferral technique 10'  dilator handouts                    Gait Training                                          Modalities

## 2022-10-18 ENCOUNTER — APPOINTMENT (OUTPATIENT)
Dept: PHYSICAL THERAPY | Facility: REHABILITATION | Age: 37
End: 2022-10-18

## 2022-10-24 ENCOUNTER — OFFICE VISIT (OUTPATIENT)
Dept: BARIATRICS | Facility: CLINIC | Age: 37
End: 2022-10-24
Payer: COMMERCIAL

## 2022-10-24 VITALS
HEIGHT: 60 IN | DIASTOLIC BLOOD PRESSURE: 80 MMHG | BODY MASS INDEX: 35.38 KG/M2 | SYSTOLIC BLOOD PRESSURE: 118 MMHG | HEART RATE: 93 BPM | WEIGHT: 180.2 LBS

## 2022-10-24 DIAGNOSIS — E66.9 OBESITY, CLASS II, BMI 35-39.9: Primary | ICD-10-CM

## 2022-10-24 PROCEDURE — 99214 OFFICE O/P EST MOD 30 MIN: CPT | Performed by: FAMILY MEDICINE

## 2022-10-24 NOTE — PATIENT INSTRUCTIONS
Goals:  Do not skip any meals! Food log (ie ) www myfitnesspal com,sparkpeople  com,loseit com,calorieking  com,etc  baritastic (use skinnytaste  com, dietdoctor  com or smartphone yung [employer] for recipes)  No sugary beverages  At least 64oz of water daily  Increase physical activity by 10 minutes daily   Gradually increase physical activity to a goal of 5 days per week for 30 minutes of MODERATE intensity PLUS 2 days per week of FULL BODY resistance training (use smartphone apps [employer], etc )  Meet with dietician

## 2022-10-24 NOTE — PROGRESS NOTES
Assessment/Plan:  Vern De was seen today for follow-up  Diagnoses and all orders for this visit:    Obesity, Class II, BMI 35-39 9    Nonsurgical program options were reviewed with patient  Has some interest in meeting with a dietitian as well as possibly participating in very low-calorie diet  She would like to meet with a dietitian first   Due to interest in very low-calorie diet I did provide patient with some products samples  In addition I did recommend not skipping breakfast and at least having something for breakfast such as a protein shake  Ensure protein shake sample provided  Goals:  Do not skip any meals! Food log (ie ) www myfitnesspal com,sparkpeople  com,loseit com,calorieking  com,etc  baritastic (use skinnytaste  com, dietdoctor  com or smartphone yung NeoReach for recipes)  No sugary beverages  At least 64oz of water daily  Increase physical activity by 10 minutes daily  Gradually increase physical activity to a goal of 5 days per week for 30 minutes of MODERATE intensity PLUS 2 days per week of FULL BODY resistance training (use smartphone apps FitON, Home Workout, etc )  Meet with dietician    Total time spent: 30 minutes with >50% face-to-face time with the patient  Follow up in approximately 3 months with Non-Surgical Physician/Advanced Practitioner  Subjective:   Chief Complaint   Patient presents with   • Follow-up     Pt is here for MWM f/u  Patient ID: Shawn Kendall  is a 30 y o  female with excess weight/obesity here to pursue weight management  Patient is pursuing Conservative Program    Most recent notes and records were reviewed  Patient presents for medical weight management follow-up  Since her last visit she has made some nutrition changes  She skips breakfast now  Still has limited protein intake    Has gained weight unfortunately     -Initial weight loss goal of 5-10% weight loss for improved health  Wt Readings from Last 10 Encounters:   10/24/22 81 7 kg (180 lb 3 2 oz)   06/28/22 78 9 kg (174 lb)   06/06/22 79 kg (174 lb 3 2 oz)   05/26/22 78 kg (172 lb)   04/11/22 76 3 kg (168 lb 3 2 oz)   10/04/21 71 8 kg (158 lb 3 2 oz)   03/15/21 66 7 kg (147 lb)   11/02/20 62 6 kg (138 lb)   10/26/20 62 6 kg (138 lb)   10/21/19 55 8 kg (123 lb)     Initial weight: 174 2lbs  Current weight: 180 2lbs  Change in weight: +6lbs      B- skips  S- no  L- sandwich and chips  S- no  D- pasta or burritos  S- popcorn    Drinks- 32oz water daily, 8oz coffee w/ oatmilk and sweetened creamer  Alcohol- 4 drinks /mo  Exercise- 30min 5 days/wk      The following portions of the patient's history were reviewed and updated as appropriate: allergies, current medications, past family history, past medical history, past social history, past surgical history, and problem list     Review of Systems   Constitutional: Negative for fever  Respiratory: Negative for shortness of breath  Cardiovascular: Negative for chest pain  Objective:  /80 (BP Location: Left arm, Patient Position: Sitting, Cuff Size: Large)   Pulse 93   Ht 4' 11 5" (1 511 m)   Wt 81 7 kg (180 lb 3 2 oz)   LMP 10/17/2022   BMI 35 79 kg/m²   Constitutional: Well-developed, well-nourished and Obese Body mass index is 35 79 kg/m²  Julio Gomes HEENT: No conjunctival injection  Pulmonary: No increased work of breathing or signs of respiratory distress  CV: Well-perfused  GI: Obese  Non-distended  MSK: No edema   Neuro: Oriented to person, place and time  Normal Speech  Normal gait  Psych: Normal affect and mood  Labs and Imaging  Recent labs and imaging have been personally reviewed    Lab Results   Component Value Date    WBC 6 86 10/11/2018    HGB 13 3 10/11/2018    HCT 42 0 10/11/2018    MCV 93 10/11/2018     10/11/2018     Lab Results   Component Value Date    SODIUM 140 10/11/2018    K 3 9 10/11/2018     10/11/2018    CO2 28 10/11/2018    AGAP 8 10/11/2018    BUN 10 10/11/2018    CREATININE 0 71 10/11/2018    GLUC 107 10/11/2018    CALCIUM 8 9 10/11/2018    AST 8 10/11/2018    ALT 17 10/11/2018    ALKPHOS 51 10/11/2018    TP 7 4 10/11/2018    TBILI 0 10 (L) 10/11/2018    EGFR 114 10/11/2018     Lab Results   Component Value Date    HGBA1C 5 7 (H) 06/07/2022     No results found for: [id number], TSH  No results found for: CHOLESTEROL  No results found for: HDL  No results found for: TRIG  No results found for: [address]

## 2022-10-27 ENCOUNTER — OFFICE VISIT (OUTPATIENT)
Dept: PHYSICAL THERAPY | Facility: REHABILITATION | Age: 37
End: 2022-10-27
Payer: COMMERCIAL

## 2022-10-27 DIAGNOSIS — M62.89 PELVIC FLOOR TENSION: ICD-10-CM

## 2022-10-27 DIAGNOSIS — N94.10 DYSPAREUNIA, FEMALE: Primary | ICD-10-CM

## 2022-10-27 PROCEDURE — 97140 MANUAL THERAPY 1/> REGIONS: CPT

## 2022-10-27 PROCEDURE — 97110 THERAPEUTIC EXERCISES: CPT

## 2022-10-27 NOTE — PROGRESS NOTES
Daily Note     Today's date: 10/27/2022  Patient name: Jessie Khan  : 1985  MRN: [id number]  Referring provider: Elma Tena PA-C  Dx:   Encounter Diagnosis     ICD-10-CM    1  Dyspareunia, female  N94 10    2  Pelvic floor tension  M62 89        Start Time: 1630  Stop Time: 2464  Total time in clinic (min): 45 minutes    Subjective: Decreased her irritants, is practicing her exercises but did not do the dilators this last week due to time  Pt feels ready to be discharged at this time, "I have the tools"  Objective: See treatment diary below    Access Code: [email or web address] Lilly: [email or web address]  Date: 10/27/2022  Prepared by: Jem Mcneill    Program Notes   55 cm ball - What a pleasure to meet you :)    Exercises  · Supine Lower Trunk Rotation with Swiss Ball - 1 x daily - 7 x weekly - 2 sets - 10 reps  · Supine Hip and Knee Flexion AROM with Swiss Ball - 1 x daily - 7 x weekly - 2 sets - 10 reps  · Supine Piriformis Stretch with Foot on Ground - 1 x daily - 7 x weekly - 2 sets - 10 reps  · Seated Thoracic Flexion and Rotation with Swiss Ball - 1 x daily - 7 x weekly - 2 sets - 10 reps  · Praying Mantis - 1 x daily - 7 x weekly - 2 sets - 10 reps  · Seated Swiss Ball Pelvic Circles - 1 x daily - 7 x weekly - 2 sets - 10 reps  · Supine Hip Adductor Stretch - 1 x daily - 7 x weekly - 3 reps - 20 hold        Assessment: Tolerated treatment well  Issued a HEP she can practice with a ball, feels confident with the other exercises and use of dilators  Primary PT spoke to [facility] at the end of the session and discharged her  STGs to be met in 4 weeks:  * Patient will be compliant with introductory HEP as prescribed  MET  * Patient will report 50% less pain with activities  MET    LTGs to be met by discharge:  * Normalize findings on sEMG to indicate PFM strength average of at least 12uV and resting average < 2 5uV  TBD  * Implements relaxation strategies on a daily basis  ONGOING  * Patient will report 80 % reduction in discomfort with either palpation or intimacy  60% improvement, able to penetrate w/ less resistance as of 10/27  * Patient will be proficient and compliant with use of vaginal dilator (s) for progression of self PFM stretching in 3-4 visits if indicated during treatment  GOAL MET  * Patient will be compliant with comprehensive home exercise program for self management of condition  ONGOING    Plan: Discontinue POC       Precautions: GERD, PCOS, depression, anxiety, LBP        Manuals 7/11 7/18 7/25 8/1 8/15 8/22 8/29 9/8 9/27 10/3 10/10 10/27   external desensitazation  x10' 10' 10'  5'   10'      Adductor release  B 20' B 15'   8' 10'  15' MFR and skin rolling    10'     PFM release   10' 10'  10'  20' 20' 15' 15'    Hip stretches   10' 10' 15'  10'  15'  15' 10'   Neuro Re-Ed               biofeedback nv nv    25'    23'     Dilator training        15'       DB           10'                                                                Ther Ex               nustep  L3x8' 8' 10' 10' 10 10' 8' 10' 10 10 10'   Review of new HEP with Pball            20'   Happy baby  x3 R            cyrus pose  x3 R            HS stretch with strap  x3 each R            Standing adductor stretches    issued for HEP           PFM + marches     3x30"          PFM + adduction     15x5"                         Ther Activity               education  anatomy and desensitizing techniqeus x10'  dilator/lube info x10' bladder [id number]' dilators, sexual positions, partner stretching, come as you are book, urge deferral technique 10'  dilator handouts                      Gait Training                                             Modalities

## 2022-10-31 NOTE — PROGRESS NOTES
Weight Management Medical Nutrition Assessment  Rosa Lombardo is here today for menu planning  Current weight 181 2 #  Patient is no longer interested in VLCD after trying product samples  Per dietary recall, patient skips breakfast and will have high calorie, high fat meals for lunch and dinner with 0-1 snacks/day  Patient shared she was skipping meals to save calories  Discussed benefits of interval eating, adequate protein intake, and mindful eating  Discussed lean protein sources as patient dislikes poultry, does not often cook meats, and relies on vegetarian sources for protein intake  Provided vegetarian protein and meal ideas handout  Low-calorie plan reviewed  Encouraged patient to decrease meals from cafeteria to 2x/week and continue with current physical activity + strength training, if possible  Recommend patient begin food logging via [email or web address] with goal of 1,100 kcal  Will reach-out for RD f/u       Likes: [place] yogurt, cottage cheese, eggs, cheese, nuts, peanut butter, hummus, tuna, fish    Dislikes: poultry     Patient seen by Medical Provider in past 6 months:  yes  Requested to schedule appointment with Medical Provider: Yes    Anthropometric Measurements  Start Weight (#): 181 2# (22)  Current Weight (#): n/a  TBW % Change from start weight: n/a  Ideal Body Weight (#): 98# (59 5")  Goal Weight (#): LT# (last weighed: )    Weight Loss History  Previous weight loss attempts: Exercise  Self Created Diets (Portion Control, Healthy Food Choices, etc )    Food and Nutrition Related History  Wake up: 7/8 am  Bed Time: 11 pm  Struggles with sleep     Food Recall  Coffee (2 Tbsp vanilla creamer + unsweetened oatmilk)   Breakfast: -- Or on weekends will have 2 eggs + 2 slices toast + lite butter   Snack: --  Lunch (2 pm): cafeteria at work; hoagie ([facility]: salami, ham, pepperoni, lettuce) + chips + something sweet (cookie, candy bar)  Snack: --  Dinner (8 pm): pasta (1 5 cups pasta + red sauce or butter, no protein) Or last night was [employer], McChicken + medium ocampo + cheeseburger Or rice + beans Or frozen burrito  Snack: popcorn Or crackers     Beverages: water, coffee (8 oz w/ oatmilk + sweetened creamer) and alcohol (1x/week)  Volume of beverage intake: 32 oz water    Weekends: Will have breakfast but will skip lunch   Cravings: sweets  Trouble area of day: evenings     Frequency of Eating out: daily (lunch) and 2-3x/week (dinner)  Food restrictions: avoids dark chocolate   Cooking: self  Food Shopping: self     Occupation:  (sedentary/light activity)    Physical Activity  Activity: Walking (30 mins)  Frequency:4-5x/week  Physical limitations/barriers to exercise: none     Estimated Needs  [employer] Needs (needs at 180 2#)  BMR: 1,426 kcal  Maintenance calories (sedentary): 1,711 kcal  1-2# loss weekly sedentary: 711-1,211 kcal  1-2# loss weekly lightly active: 961-1,461 kcal    Protein: 53-67 grams (1 2-1 5g/kg IBW)  Fluid: 52 ounces (35mL/kg IBW)    Nutrition Diagnosis  Yes; Overweight/obesity related to Excess energy intake as evidenced by BMI more than normative standard for age and sex (obesity-grade II 35-39  9)     Nutrition Intervention    Nutrition Prescription  Calories: 1,000-1,200 kcal  Protein: 60-85 g  Fluid: 52+ ounces    Meal Plan (All/Pro)  Breakfast: 200/15-20  Snack: --  Lunch: 364/28-70  Snack: 100-150/5+  Dinner: 300-400/15-30  Snack: 100-150/5+    Nutrition Education  Calorie controlled menu  Lean protein food choices  Healthy snack options  Food journaling tips    Nutrition Counseling  Strategies: meal planning, portion sizes, healthy snack choices, hydration, fiber intake, protein intake, exercise, food logging    Monitoring and Evaluation:    Evaluation criteria  Energy Intake  Meet protein needs  Maintain adequate hydration  Monitor weekly weight  Meal planning/preparation  Food journal   Decreased portions at mealtimes and snacks  Physical activity Barriers to learning:none  Readiness to change: Preparation:  (Getting ready to change)   Comprehension: good  Expected Compliance: good

## 2022-11-01 ENCOUNTER — OFFICE VISIT (OUTPATIENT)
Dept: BARIATRICS | Facility: CLINIC | Age: 37
End: 2022-11-01

## 2022-11-01 VITALS — WEIGHT: 181.2 LBS | HEIGHT: 60 IN | BODY MASS INDEX: 35.57 KG/M2

## 2022-11-01 DIAGNOSIS — R63.5 ABNORMAL WEIGHT GAIN: Primary | ICD-10-CM

## 2023-01-10 ENCOUNTER — APPOINTMENT (EMERGENCY)
Dept: ULTRASOUND IMAGING | Facility: HOSPITAL | Age: 38
End: 2023-01-10

## 2023-01-10 ENCOUNTER — APPOINTMENT (EMERGENCY)
Dept: RADIOLOGY | Facility: HOSPITAL | Age: 38
End: 2023-01-10

## 2023-01-10 ENCOUNTER — HOSPITAL ENCOUNTER (EMERGENCY)
Facility: HOSPITAL | Age: 38
Discharge: HOME | End: 2023-01-11
Attending: EMERGENCY MEDICINE

## 2023-01-10 ENCOUNTER — HOSPITAL ENCOUNTER (EMERGENCY)
Facility: HOSPITAL | Age: 38
Discharge: HOME | End: 2023-01-10
Attending: EMERGENCY MEDICINE

## 2023-01-10 VITALS
HEART RATE: 111 BPM | DIASTOLIC BLOOD PRESSURE: 56 MMHG | SYSTOLIC BLOOD PRESSURE: 124 MMHG | OXYGEN SATURATION: 100 % | TEMPERATURE: 99.7 F | RESPIRATION RATE: 16 BRPM

## 2023-01-10 DIAGNOSIS — E83.42 HYPOMAGNESEMIA: ICD-10-CM

## 2023-01-10 DIAGNOSIS — K52.9 GASTROENTERITIS: ICD-10-CM

## 2023-01-10 DIAGNOSIS — E87.6 HYPOKALEMIA: ICD-10-CM

## 2023-01-10 DIAGNOSIS — E83.51 HYPOCALCEMIA: ICD-10-CM

## 2023-01-10 DIAGNOSIS — K52.9 GASTROENTERITIS: Primary | ICD-10-CM

## 2023-01-10 DIAGNOSIS — R11.2 NAUSEA AND VOMITING: Primary | ICD-10-CM

## 2023-01-10 LAB
BACTERIA UR QL AUTO: ABNORMAL /HPF
BILIRUB UR QL STRIP: NEGATIVE
CLARITY UR: CLEAR
COLOR UR: ABNORMAL
EXT PREGNANCY TEST URINE: NEGATIVE
EXT. CONTROL: NORMAL
GLUCOSE UR STRIP-MCNC: NEGATIVE MG/DL
HGB UR QL STRIP.AUTO: NEGATIVE
KETONES UR STRIP-MCNC: ABNORMAL MG/DL
LEUKOCYTE ESTERASE UR QL STRIP: NEGATIVE
MUCOUS THREADS UR QL AUTO: ABNORMAL
NITRITE UR QL STRIP: NEGATIVE
NON-SQ EPI CELLS URNS QL MICRO: ABNORMAL /HPF
PH UR STRIP.AUTO: 6.5 [PH]
PROT UR STRIP-MCNC: ABNORMAL MG/DL
RBC #/AREA URNS AUTO: ABNORMAL /HPF
SP GR UR STRIP.AUTO: 1.03 (ref 1–1.03)
UROBILINOGEN UR STRIP-ACNC: <2 MG/DL
WBC #/AREA URNS AUTO: ABNORMAL /HPF

## 2023-01-10 RX ORDER — ONDANSETRON 2 MG/ML
4 INJECTION INTRAMUSCULAR; INTRAVENOUS ONCE
Status: COMPLETED | OUTPATIENT
Start: 2023-01-10 | End: 2023-01-10

## 2023-01-10 RX ORDER — ONDANSETRON 4 MG/1
4 TABLET, FILM COATED ORAL EVERY 8 HOURS PRN
Qty: 15 TABLET | Refills: 0 | Status: SHIPPED | OUTPATIENT
Start: 2023-01-10 | End: 2023-01-17

## 2023-01-10 RX ORDER — KETOROLAC TROMETHAMINE 30 MG/ML
15 INJECTION, SOLUTION INTRAMUSCULAR; INTRAVENOUS ONCE
Status: COMPLETED | OUTPATIENT
Start: 2023-01-10 | End: 2023-01-11

## 2023-01-10 RX ORDER — ACETAMINOPHEN 325 MG/1
975 TABLET ORAL ONCE
Status: COMPLETED | OUTPATIENT
Start: 2023-01-10 | End: 2023-01-11

## 2023-01-10 RX ADMIN — SODIUM CHLORIDE, SODIUM LACTATE, POTASSIUM CHLORIDE, AND CALCIUM CHLORIDE 500 ML: .6; .31; .03; .02 INJECTION, SOLUTION INTRAVENOUS at 08:32

## 2023-01-10 RX ADMIN — ONDANSETRON 4 MG: 2 INJECTION INTRAMUSCULAR; INTRAVENOUS at 08:33

## 2023-01-10 RX ADMIN — ONDANSETRON 4 MG: 2 INJECTION INTRAMUSCULAR; INTRAVENOUS at 23:55

## 2023-01-10 RX ADMIN — SODIUM CHLORIDE 1000 ML: 0.9 INJECTION, SOLUTION INTRAVENOUS at 23:56

## 2023-01-10 NOTE — ED PROVIDER NOTES
History  Chief Complaint   Patient presents with   • Vomiting     Pt presents to the ED with c/o vomiting and diarrhea since last night  HPI patient is a 77-year-old female presenting nausea and vomiting beginning yesterday after lunch  Still currently nauseated  Emesis has been nonbloody  Has also had some mild frequency and urgency with urination  Has some mild generalized diffused abdominal pain with onset of nausea/vomiting  Possible chance of pregnancy  Has not had fevers but has had some chills with congestion  Diarrhea  has been profuse feels slightly dehydrated  Prior to Admission Medications   Prescriptions Last Dose Informant Patient Reported? Taking? buPROPion (WELLBUTRIN XL) 300 mg 24 hr tablet   Yes No   Sig: Take 300 mg by mouth daily   citalopram (CeleXA) 20 mg tablet   Yes No   Sig: Take 20 mg by mouth daily at bedtime   clindamycin (CLEOCIN T) 1 % external solution   No No   Sig: Apply topically 2 (two) times a day   omeprazole (PriLOSEC) 20 mg delayed release capsule   Yes No   Sig: Take 20 mg by mouth daily      Facility-Administered Medications: None       Past Medical History:   Diagnosis Date   • Abnormal weight gain 03/23/2006   • Acne 03/23/2006   • Depression    • Dysfunction of both eustachian tubes     last assessed 12/21/17   • History of PCOS    • Hyperlipidemia    • Polycystic ovarian syndrome 01/30/2009   • Severe single current episode of major depressive disorder, [facility]     last assessed 06/21/16   • Suicidal ideation     last assessed 10/24/17       Past Surgical History:   Procedure Laterality Date   • CO ESOPHAGOGASTRODUODENOSCOPY TRANSORAL DIAGNOSTIC N/A 10/24/2018    Procedure: ESOPHAGOGASTRODUODENOSCOPY (EGD); Surgeon: Arthur Cisse MD;  Location: [facility];   Service: Gastroenterology   • WISDOM TOOTH EXTRACTION         Family History   Problem Relation Age of Onset   • Depression Mother    • Colorectal Cancer Mother 72   • Basal cell carcinoma Father    • Breast cancer Cousin    • Colon cancer Maternal Uncle    • Ovarian cancer Neg Hx      I have reviewed and agree with the history as documented  E-Cigarette/Vaping   • E-Cigarette Use Never User      E-Cigarette/Vaping Substances     Social History     Tobacco Use   • Smoking status: Never   • Smokeless tobacco: Never   Vaping Use   • Vaping Use: Never used   Substance Use Topics   • Alcohol use: Yes     Comment: rare alcohol use   • Drug use: No        Review of Systems   Constitutional: Positive for chills  Negative for fever  HENT: Positive for congestion  Negative for rhinorrhea and sore throat  Eyes: Negative for photophobia, pain, redness and visual disturbance  Respiratory: Negative for shortness of breath and wheezing  Cardiovascular: Negative for chest pain, palpitations and leg swelling  Gastrointestinal: Positive for abdominal pain (generalized), diarrhea, nausea and vomiting  Negative for abdominal distention and constipation  Genitourinary: Positive for frequency and urgency  Negative for difficulty urinating, dysuria and flank pain  Musculoskeletal: Negative for neck pain and neck stiffness  Skin: Negative for rash  Neurological: Negative for dizziness, tremors, seizures, syncope, facial asymmetry, speech difficulty, weakness, light-headedness, numbness and headaches  Psychiatric/Behavioral: Negative for confusion  All other systems reviewed and are negative        Physical Exam  ED Triage Vitals   Temperature Pulse Respirations Blood Pressure SpO2   01/10/23 0745 01/10/23 0745 01/10/23 0745 01/10/23 0745 01/10/23 0745   99 7 °F (37 6 °C) (!) 116 16 154/63 95 %      Temp Source Heart Rate Source Patient Position - Orthostatic VS BP Location FiO2 (%)   01/10/23 0745 01/10/23 0745 01/10/23 0844 01/10/23 0745 --   Oral Monitor Lying Right arm       Pain Score       --                    Orthostatic Vital Signs  Vitals:    01/10/23 0745 01/10/23 6764 BP: 154/63 124/56   Pulse: (!) 116 (!) 111   Patient Position - Orthostatic VS:  Lying       Physical Exam  Vitals and nursing note reviewed  Constitutional:       General: She is not in acute distress  Appearance: Normal appearance  She is ill-appearing (mild)  She is not toxic-appearing or diaphoretic  HENT:      Head: Normocephalic and atraumatic  Nose: Nose normal  No congestion or rhinorrhea  Mouth/Throat:      Mouth: Mucous membranes are moist       Pharynx: Oropharynx is clear  No oropharyngeal exudate or posterior oropharyngeal erythema  Eyes:      General: No scleral icterus  Right eye: No discharge  Left eye: No discharge  Extraocular Movements: Extraocular movements intact  Conjunctiva/sclera: Conjunctivae normal       Pupils: Pupils are equal, round, and reactive to light  Neck:      Vascular: No carotid bruit  Cardiovascular:      Rate and Rhythm: Regular rhythm  Tachycardia present  Pulses: Normal pulses  Heart sounds: Normal heart sounds  No murmur heard  No friction rub  No gallop  Pulmonary:      Effort: Pulmonary effort is normal  No respiratory distress  Breath sounds: Normal breath sounds  No stridor  No wheezing, rhonchi or rales  Chest:      Chest wall: No tenderness  Abdominal:      General: Abdomen is flat  Bowel sounds are normal  There is no distension  Palpations: Abdomen is soft  There is no mass  Tenderness: There is no abdominal tenderness  There is no right CVA tenderness, left CVA tenderness, guarding or rebound  Hernia: No hernia is present  Musculoskeletal:         General: No swelling, tenderness, deformity or signs of injury  Normal range of motion  Cervical back: Normal range of motion and neck supple  No rigidity or tenderness  Right lower leg: No edema  Left lower leg: No edema  Lymphadenopathy:      Cervical: No cervical adenopathy     Skin:     General: Skin is warm and dry       Coloration: Skin is not jaundiced or pale  Findings: No bruising, erythema, lesion or rash  Neurological:      General: No focal deficit present  Mental Status: She is alert and oriented to person, place, and time  Cranial Nerves: No cranial nerve deficit  Sensory: No sensory deficit  Motor: No weakness  Coordination: Coordination normal       Gait: Gait normal       Deep Tendon Reflexes: Reflexes normal    Psychiatric:         Mood and Affect: Mood normal          Behavior: Behavior normal          ED Medications  Medications   lactated ringers bolus 500 mL (500 mL Intravenous New Bag 1/10/23 0832)   ondansetron (ZOFRAN) injection 4 mg (4 mg Intravenous Given 1/10/23 0833)       Diagnostic Studies  Results Reviewed     Procedure Component Value Units Date/Time    Urine Microscopic [[id number]]  (Abnormal) Collected: 01/10/23 0843    Lab Status: Final result Specimen: Urine, Clean Catch Updated: 01/10/23 0852     RBC, UA 1-2 /hpf      WBC, UA 1-2 /hpf      Epithelial Cells Occasional /hpf      Bacteria, UA Occasional /hpf      MUCUS THREADS Occasional    UA w Reflex to Microscopic w Reflex to Culture [[id number]]  (Abnormal) Collected: 01/10/23 0843    Lab Status: Final result Specimen: Urine, Clean Catch Updated: 01/10/23 0852     Color, UA Light Yellow     Clarity, UA Clear     Specific Gravity, UA 1 027     pH, UA 6 5     Leukocytes, UA Negative     Nitrite, UA Negative     Protein, UA Trace mg/dl      Glucose, UA Negative mg/dl      Ketones, UA 10 (1+) mg/dl      Urobilinogen, UA <2 0 mg/dl      Bilirubin, UA Negative     Occult Blood, UA Negative    POCT pregnancy, urine [[id number]]  (Normal) Resulted: 01/10/23 0843    Lab Status: Final result Updated: 01/10/23 0844     EXT Preg Test, Ur Negative     Control Valid                 No orders to display         Procedures  Procedures      ED Course       Patient is feeling much better after Zofran and LR bolus  UA negative  She is agreeable to plan for discharge, with Zofran tolerate p o  fluids  Discussed return precautions  She will follow-up with PCP as needed  MDM  Viral gastroenteritis  Viral syndrome  Hyperemesis gravidarum (poc preg negative)  Do not suspect SBO, pancreatitis  DDx including but not limited to: food poisoning, viral illness, metabolic abnormality, dehydration, intracranial process, GI etiology, hyperemesis gravidarum , UTI, adverse reaction; doubt acute surgical intraabdominal process, Boorhave's syndrome, mediastinitis  Disposition  Final diagnoses:   Nausea and vomiting   Gastroenteritis     Time reflects when diagnosis was documented in both MDM as applicable and the Disposition within this note     Time User Action Codes Description Comment    1/10/2023  9:44 AM Joy Minpame Add [R11 2] Nausea and vomiting     1/10/2023  9:44 AM Joy Minpame Add [K52 9] Gastroenteritis       ED Disposition     ED Disposition   Discharge    Condition   Stable    Date/Time   Tue Jeronimo 10, 2023  9:45 AM    Comment   Sheila Diaz discharge to home/self care  Follow-up Information     Follow up With Specialties Details Why Contact Info    Rosa Crenshaw MD Family Medicine  As needed [address]  [facility] [address]  [phone]            Patient's Medications   Discharge Prescriptions    ONDANSETRON (ZOFRAN) 4 MG TABLET    Take 1 tablet (4 mg total) by mouth every 8 (eight) hours as needed for nausea or vomiting for up to 7 days       Start Date: 1/10/2023 End Date: 1/17/2023       Order Dose: 4 mg       Quantity: 15 tablet    Refills: 0     No discharge procedures on file  PDMP Review     None           ED Provider  Attending physically available and evaluated Sheila Diaz I managed the patient along with the ED Attending      Electronically Signed by         Christine Mansfield DO  01/10/23 5381

## 2023-01-10 NOTE — DISCHARGE INSTRUCTIONS
Follow-up with your pcp as needed  Return to the ED with worsening symptoms including urinary, worsening abdominal pain, shortness of breath, dehydration, high fevers, etc  Continue drinking plenty of fluids and take zofran for nausea as needed

## 2023-01-10 NOTE — Clinical Note
Alansaji Acevedo was seen and treated in our emergency department on 1/10/2023  Diagnosis:     Georgette Machado  may return to work on return date  She may return on this date: 01/11/2023         If you have any questions or concerns, please don't hesitate to call        North Hernandes DO    ______________________________           _______________          _______________  Hospital Representative                              Date                                Time

## 2023-01-10 NOTE — ED ATTENDING ATTESTATION
1/10/2023  I, Skye Parada MD, saw and evaluated the patient  I have discussed the patient with the resident/non-physician practitioner and agree with the resident's/non-physician practitioner's findings, Plan of Care, and MDM as documented in the resident's/non-physician practitioner's note, except where noted  All available labs and Radiology studies were reviewed  I was present for key portions of any procedure(s) performed by the resident/non-physician practitioner and I was immediately available to provide assistance  At this point I agree with the current assessment done in the Emergency Department  I have conducted an independent evaluation of this patient a history and physical is as follows:    S:  Chief Complaint   Patient presents with   • Vomiting     Pt presents to the ED with c/o vomiting and diarrhea since last night  Albert Beltran is a 30 y o  female who presents with the chief complaint of nausea, vomiting, diarrhea that started yesterday after lunch  She ate a spicy chicken sandwich and a slim mita from her work cafeteria about an hour before the onset of symptoms  She reports that she also has some urinary urgency and frequency  During history she does report that it is possible she may be pregnant  O:  ED Triage Vitals   Temperature Pulse Respirations Blood Pressure SpO2   01/10/23 0745 01/10/23 0745 01/10/23 0745 01/10/23 0745 01/10/23 0745   99 7 °F (37 6 °C) (!) 116 16 154/63 95 %      Temp Source Heart Rate Source Patient Position - Orthostatic VS BP Location FiO2 (%)   01/10/23 0745 01/10/23 0745 01/10/23 0844 01/10/23 0745 --   Oral Monitor Lying Right arm       Pain Score       --                Physical Exam  Vitals and nursing note reviewed  Constitutional:       General: She is in acute distress (mild)  Appearance: She is well-developed  HENT:      Head: Normocephalic and atraumatic  Eyes:      Pupils: Pupils are equal, round, and reactive to light  Neck:      Vascular: No JVD  Cardiovascular:      Rate and Rhythm: Regular rhythm  Tachycardia present  Heart sounds: Normal heart sounds  No murmur heard  No friction rub  No gallop  Pulmonary:      Effort: Pulmonary effort is normal  No respiratory distress  Breath sounds: Normal breath sounds  No wheezing or rales  Chest:      Chest wall: No tenderness  Abdominal:      General: There is no distension  Palpations: There is no mass  Tenderness: There is no abdominal tenderness  There is no guarding  Musculoskeletal:         General: No tenderness  Normal range of motion  Cervical back: Normal range of motion  Skin:     General: Skin is warm and dry  Neurological:      General: No focal deficit present  Mental Status: She is alert and oriented to person, place, and time  Psychiatric:         Behavior: Behavior normal          Thought Content:  Thought content normal          Judgment: Judgment normal        A/P:  Background: 30 y o  female presents with n/v/d    Differential DX includes but is not limited to: food poisoning, viral gastroenteritis, pregnancy, less likely uti, doubt diabetes    Plan: urinalysis, urine hcg, symptomatic treatment        ED Course     Labs Reviewed   UA W REFLEX TO MICROSCOPIC WITH REFLEX TO CULTURE - Abnormal       Result Value Ref Range Status    Color, UA Light Yellow   Final    Clarity, UA Clear   Final    Specific Gravity, UA 1 027  1 003 - 1 030 Final    pH, UA 6 5  4 5, 5 0, 5 5, 6 0, 6 5, 7 0, 7 5, 8 0 Final    Leukocytes, UA Negative  Negative Final    Nitrite, UA Negative  Negative Final    Protein, UA Trace (*) Negative mg/dl Final    Glucose, UA Negative  Negative mg/dl Final    Ketones, UA 10 (1+) (*) Negative mg/dl Final    Urobilinogen, UA <2 0  <2 0 mg/dl mg/dl Final    Bilirubin, UA Negative  Negative Final    Occult Blood, UA Negative  Negative Final   URINE MICROSCOPIC - Abnormal    RBC, UA 1-2  None Seen, 1-2 /hpf Final    WBC, UA 1-2  None Seen, 1-2 /hpf Final    Epithelial Cells Occasional  None Seen, Occasional /hpf Final    Bacteria, UA Occasional  None Seen, Occasional /hpf Final    MUCUS THREADS Occasional (*) None Seen Final   POCT PREGNANCY, URINE - Normal    EXT Preg Test, Ur Negative   Final    Control Valid   Final     Medications   ondansetron (ZOFRAN) injection 4 mg (4 mg Intravenous Given 1/10/23 7333)   lactated ringers bolus 500 mL (0 mL Intravenous Stopped 1/10/23 1003)         Critical Care Time  Procedures    Time reflects when diagnosis was documented in both MDM as applicable and the Disposition within this note     Time User Action Codes Description Comment    1/10/2023  9:44 AM Kian David Add [R11 2] Nausea and vomiting     1/10/2023  9:44 AM Kian [place] Add [K52 9] Gastroenteritis       ED Disposition     ED Disposition   Discharge    Condition   Stable    Date/Time   Tue Jeronimo 10, 2023  9:45 AM    Comment   Will Holland discharge to home/self care                 Follow-up Information     Follow up With Specialties Details Why Contact Info    Kaci Guillen MD Family Medicine  As needed [address]  [address]  [phone]

## 2023-01-11 ENCOUNTER — APPOINTMENT (EMERGENCY)
Dept: CT IMAGING | Facility: HOSPITAL | Age: 38
End: 2023-01-11

## 2023-01-11 VITALS
RESPIRATION RATE: 17 BRPM | SYSTOLIC BLOOD PRESSURE: 101 MMHG | TEMPERATURE: 98.2 F | OXYGEN SATURATION: 95 % | HEART RATE: 96 BPM | DIASTOLIC BLOOD PRESSURE: 57 MMHG

## 2023-01-11 LAB
ALBUMIN SERPL BCP-MCNC: 3.8 G/DL (ref 3.5–5)
ALP SERPL-CCNC: 56 U/L (ref 34–104)
ALT SERPL W P-5'-P-CCNC: 11 U/L (ref 7–52)
ANION GAP SERPL CALCULATED.3IONS-SCNC: 8 MMOL/L (ref 4–13)
APTT PPP: 30 SECONDS (ref 23–37)
AST SERPL W P-5'-P-CCNC: 16 U/L (ref 13–39)
BASOPHILS # BLD AUTO: 0.01 THOUSANDS/ÂΜL (ref 0–0.1)
BASOPHILS NFR BLD AUTO: 0 % (ref 0–1)
BILIRUB SERPL-MCNC: 0.44 MG/DL (ref 0.2–1)
BUN SERPL-MCNC: 10 MG/DL (ref 5–25)
CALCIUM SERPL-MCNC: 8.2 MG/DL (ref 8.4–10.2)
CHLORIDE SERPL-SCNC: 103 MMOL/L (ref 96–108)
CO2 SERPL-SCNC: 23 MMOL/L (ref 21–32)
CREAT SERPL-MCNC: 0.57 MG/DL (ref 0.6–1.3)
EOSINOPHIL # BLD AUTO: 0 THOUSAND/ÂΜL (ref 0–0.61)
EOSINOPHIL NFR BLD AUTO: 0 % (ref 0–6)
ERYTHROCYTE [DISTWIDTH] IN BLOOD BY AUTOMATED COUNT: 14.8 % (ref 11.6–15.1)
FLUAV RNA RESP QL NAA+PROBE: NEGATIVE
FLUBV RNA RESP QL NAA+PROBE: NEGATIVE
GFR SERPL CREATININE-BSD FRML MDRD: 120 ML/MIN/1.73SQ M
GLUCOSE SERPL-MCNC: 118 MG/DL (ref 65–140)
HCT VFR BLD AUTO: 34.9 % (ref 34.8–46.1)
HGB BLD-MCNC: 11.1 G/DL (ref 11.5–15.4)
IMM GRANULOCYTES # BLD AUTO: 0.04 THOUSAND/UL (ref 0–0.2)
IMM GRANULOCYTES NFR BLD AUTO: 1 % (ref 0–2)
INR PPP: 1.1 (ref 0.84–1.19)
LACTATE SERPL-SCNC: 0.7 MMOL/L (ref 0.5–2)
LIPASE SERPL-CCNC: <6 U/L (ref 11–82)
LYMPHOCYTES # BLD AUTO: 0.68 THOUSANDS/ÂΜL (ref 0.6–4.47)
LYMPHOCYTES NFR BLD AUTO: 8 % (ref 14–44)
MAGNESIUM SERPL-MCNC: 1.8 MG/DL (ref 1.9–2.7)
MCH RBC QN AUTO: 28 PG (ref 26.8–34.3)
MCHC RBC AUTO-ENTMCNC: 31.8 G/DL (ref 31.4–37.4)
MCV RBC AUTO: 88 FL (ref 82–98)
MONOCYTES # BLD AUTO: 0.69 THOUSAND/ÂΜL (ref 0.17–1.22)
MONOCYTES NFR BLD AUTO: 8 % (ref 4–12)
NEUTROPHILS # BLD AUTO: 7.24 THOUSANDS/ÂΜL (ref 1.85–7.62)
NEUTS SEG NFR BLD AUTO: 83 % (ref 43–75)
NRBC BLD AUTO-RTO: 0 /100 WBCS
PLATELET # BLD AUTO: 314 THOUSANDS/UL (ref 149–390)
PMV BLD AUTO: 9.2 FL (ref 8.9–12.7)
POTASSIUM SERPL-SCNC: 3.3 MMOL/L (ref 3.5–5.3)
PROCALCITONIN SERPL-MCNC: 0.42 NG/ML
PROT SERPL-MCNC: 6.5 G/DL (ref 6.4–8.4)
PROTHROMBIN TIME: 14.4 SECONDS (ref 11.6–14.5)
RBC # BLD AUTO: 3.96 MILLION/UL (ref 3.81–5.12)
RSV RNA RESP QL NAA+PROBE: NEGATIVE
SARS-COV-2 RNA RESP QL NAA+PROBE: NEGATIVE
SODIUM SERPL-SCNC: 134 MMOL/L (ref 135–147)
WBC # BLD AUTO: 8.66 THOUSAND/UL (ref 4.31–10.16)

## 2023-01-11 RX ORDER — METRONIDAZOLE 500 MG/1
500 TABLET ORAL EVERY 8 HOURS SCHEDULED
Qty: 15 TABLET | Refills: 0 | Status: SHIPPED | OUTPATIENT
Start: 2023-01-11 | End: 2023-01-16

## 2023-01-11 RX ORDER — CIPROFLOXACIN 500 MG/1
500 TABLET, FILM COATED ORAL 2 TIMES DAILY
Qty: 10 TABLET | Refills: 0 | Status: SHIPPED | OUTPATIENT
Start: 2023-01-11 | End: 2023-01-16

## 2023-01-11 RX ORDER — METRONIDAZOLE 500 MG/1
500 TABLET ORAL ONCE
Status: COMPLETED | OUTPATIENT
Start: 2023-01-11 | End: 2023-01-11

## 2023-01-11 RX ORDER — CIPROFLOXACIN 500 MG/1
500 TABLET, FILM COATED ORAL ONCE
Status: COMPLETED | OUTPATIENT
Start: 2023-01-11 | End: 2023-01-11

## 2023-01-11 RX ORDER — MAGNESIUM SULFATE 1 G/100ML
1 INJECTION INTRAVENOUS ONCE
Status: COMPLETED | OUTPATIENT
Start: 2023-01-11 | End: 2023-01-11

## 2023-01-11 RX ORDER — POTASSIUM CHLORIDE 20 MEQ/1
20 TABLET, EXTENDED RELEASE ORAL ONCE
Status: COMPLETED | OUTPATIENT
Start: 2023-01-11 | End: 2023-01-11

## 2023-01-11 RX ADMIN — ACETAMINOPHEN 975 MG: 325 TABLET, FILM COATED ORAL at 00:03

## 2023-01-11 RX ADMIN — MAGNESIUM SULFATE HEPTAHYDRATE 1 G: 1 INJECTION, SOLUTION INTRAVENOUS at 01:19

## 2023-01-11 RX ADMIN — IOHEXOL 100 ML: 350 INJECTION, SOLUTION INTRAVENOUS at 01:06

## 2023-01-11 RX ADMIN — POTASSIUM CHLORIDE 20 MEQ: 1500 TABLET, EXTENDED RELEASE ORAL at 01:18

## 2023-01-11 RX ADMIN — KETOROLAC TROMETHAMINE 15 MG: 30 INJECTION, SOLUTION INTRAMUSCULAR; INTRAVENOUS at 00:07

## 2023-01-11 RX ADMIN — METRONIDAZOLE 500 MG: 500 TABLET ORAL at 02:20

## 2023-01-11 RX ADMIN — CIPROFLOXACIN HYDROCHLORIDE 500 MG: 500 TABLET, FILM COATED ORAL at 02:20

## 2023-01-11 NOTE — ED PROVIDER NOTES
History  Chief Complaint   Patient presents with   • Vomiting     Pt seen earlier today for vomiting and diarrhea, reports coming back due to provider stating to return for any new fevers  Reports new fever 102 at home with headache, no meds taken at home pta  Pt diagnosed with viral gastritis  31yo F presenting for vomiting  1d ago, while at work, she consumed a spicy chicken slider from the refrigerator section of a convenient store  About 20min later, she developed several episodes of watery diarrhea and NBNB vomiting  Finds that it is worse when eating/drinking, however, she reports unprovoked episodes as well  Pt was seen in our department earlier today given frequency of sx, in addition to the inability to keep fluids/foods down  UA was done, which was unremarkable  She did well after fluids and zofran, and was d/c  She returns this evening d/t worsening sx, and now the development of fever and epigastric abdominal pain  Abdominal pain is worse when eating fatty foods, but is otherwise constant  Denies abd surgeries, back pain, pelvic pain  History provided by:  Patient      Prior to Admission Medications   Prescriptions Last Dose Informant Patient Reported? Taking?    buPROPion (WELLBUTRIN XL) 300 mg 24 hr tablet   Yes No   Sig: Take 300 mg by mouth daily   citalopram (CeleXA) 20 mg tablet   Yes No   Sig: Take 20 mg by mouth daily at bedtime   clindamycin (CLEOCIN T) 1 % external solution   No No   Sig: Apply topically 2 (two) times a day   omeprazole (PriLOSEC) 20 mg delayed release capsule   Yes No   Sig: Take 20 mg by mouth daily   ondansetron (Zofran) 4 mg tablet   No No   Sig: Take 1 tablet (4 mg total) by mouth every 8 (eight) hours as needed for nausea or vomiting for up to 7 days      Facility-Administered Medications: None       Past Medical History:   Diagnosis Date   • Abnormal weight gain 03/23/2006   • Acne 03/23/2006   • Depression    • Dysfunction of both eustachian tubes     last assessed 12/21/17   • History of PCOS    • Hyperlipidemia    • Polycystic ovarian syndrome 01/30/2009   • Severe single current episode of major depressive disorder, without psychotic features ([facility] [address] )     last assessed 06/21/16   • Suicidal ideation     last assessed 10/24/17       Past Surgical History:   Procedure Laterality Date   • IL ESOPHAGOGASTRODUODENOSCOPY TRANSORAL DIAGNOSTIC N/A 10/24/2018    Procedure: ESOPHAGOGASTRODUODENOSCOPY (EGD); Surgeon: Rosy Isidro MD;  Location: [facility]; Service: Gastroenterology   • WISDOM TOOTH EXTRACTION         Family History   Problem Relation Age of Onset   • Depression Mother    • Colorectal Cancer Mother 72   • Basal cell carcinoma Father    • Breast cancer Cousin    • Colon cancer Maternal Uncle    • Ovarian cancer Neg Hx      I have reviewed and agree with the history as documented  E-Cigarette/Vaping   • E-Cigarette Use Never User      E-Cigarette/Vaping Substances   • Nicotine No    • THC No    • CBD No      Social History     Tobacco Use   • Smoking status: Never   • Smokeless tobacco: Never   Vaping Use   • Vaping Use: Never used   Substance Use Topics   • Alcohol use: Yes     Comment: rare alcohol use   • Drug use: No        Review of Systems   Constitutional: Positive for appetite change, chills and fever  HENT: Negative  Eyes: Positive for photophobia  Respiratory: Negative  Cardiovascular: Negative  Gastrointestinal: Positive for abdominal pain, diarrhea, nausea and vomiting  Genitourinary: Negative  Musculoskeletal: Negative  Skin: Negative  Neurological: Positive for headaches  Psychiatric/Behavioral: Negative          Physical Exam  ED Triage Vitals [01/10/23 2219]   Temperature Pulse Respirations Blood Pressure SpO2   (!) 101 9 °F (38 8 °C) (!) 126 16 111/55 95 %      Temp Source Heart Rate Source Patient Position - Orthostatic VS BP Location FiO2 (%)   Oral Monitor Sitting Right arm --      Pain Score       9 Orthostatic Vital Signs  Vitals:    01/10/23 2219 01/11/23 0015 01/11/23 0100 01/11/23 0130   BP: 111/55 112/55 99/50 101/57   Pulse: (!) 126 (!) 107 96 96   Patient Position - Orthostatic VS: Sitting   Sitting       Physical Exam  Constitutional:       General: She is not in acute distress  Appearance: Normal appearance  HENT:      Head: Normocephalic and atraumatic  Right Ear: External ear normal       Left Ear: External ear normal       Nose: Nose normal  No rhinorrhea  Mouth/Throat:      Mouth: Mucous membranes are dry  Pharynx: Oropharynx is clear  Eyes:      Extraocular Movements: Extraocular movements intact  Conjunctiva/sclera: Conjunctivae normal    Cardiovascular:      Rate and Rhythm: Regular rhythm  Tachycardia present  Pulses: Normal pulses  Heart sounds: Normal heart sounds  Pulmonary:      Effort: Pulmonary effort is normal       Breath sounds: Normal breath sounds  Abdominal:      General: Abdomen is flat  Tenderness: There is abdominal tenderness (RUQ, epigastric)  Positive signs include Marinelli's sign  Skin:     General: Skin is warm and dry  Capillary Refill: Capillary refill takes less than 2 seconds  Neurological:      General: No focal deficit present  Mental Status: She is alert and oriented to person, place, and time     Psychiatric:         Mood and Affect: Mood normal          Behavior: Behavior normal          ED Medications  Medications   sodium chloride 0 9 % bolus 1,000 mL (0 mL Intravenous Stopped 1/11/23 0226)   acetaminophen (TYLENOL) tablet 975 mg (975 mg Oral Given 1/11/23 0003)   ondansetron (ZOFRAN) injection 4 mg (4 mg Intravenous Given 1/10/23 2355)   ketorolac (TORADOL) injection 15 mg (15 mg Intravenous Given 1/11/23 0007)   magnesium sulfate IVPB (premix) SOLN 1 g (0 g Intravenous Stopped 1/11/23 0226)   potassium chloride (K-DUR,KLOR-CON) CR tablet 20 mEq (20 mEq Oral Given 1/11/23 0118)   iohexol (OMNIPAQUE) 350 MG/ML injection (SINGLE-DOSE) 100 mL (100 mL Intravenous Given 1/11/23 0106)   ciprofloxacin (CIPRO) tablet 500 mg (500 mg Oral Given 1/11/23 0220)   metroNIDAZOLE (FLAGYL) tablet 500 mg (500 mg Oral Given 1/11/23 0220)       Diagnostic Studies  Results Reviewed     Procedure Component Value Units Date/Time    FLU/RSV/COVID - if FLU/RSV clinically relevant [[id number]]  (Normal) Collected: 01/11/23 0009    Lab Status: Final result Specimen: Nares from Nose Updated: 01/11/23 0057     SARS-CoV-2 Negative     INFLUENZA A PCR Negative     INFLUENZA B PCR Negative     RSV PCR Negative    Narrative:      FOR PEDIATRIC PATIENTS - copy/paste COVID Guidelines URL to browser: [email or web address]  [employer]    SARS-CoV-2 assay is a Nucleic Acid Amplification assay intended for the  qualitative detection of nucleic acid from SARS-CoV-2 in nasopharyngeal  swabs  Results are for the presumptive identification of SARS-CoV-2 RNA  Positive results are indicative of infection with SARS-CoV-2, the virus  causing COVID-19, but do not rule out bacterial infection or co-infection  with other viruses  Laboratories within the [place] and its  territories are required to report all positive results to the appropriate  public health authorities  Negative results do not preclude SARS-CoV-2  infection and should not be used as the sole basis for treatment or other  patient management decisions  Negative results must be combined with  clinical observations, patient history, and epidemiological information  This test has not been FDA cleared or approved  This test has been authorized by FDA under an Emergency Use Authorization  (EUA)   This test is only authorized for the duration of time the  declaration that circumstances exist justifying the authorization of the  emergency use of an in vitro diagnostic tests for detection of SARS-CoV-2  virus and/or diagnosis of COVID-19 infection under section 564(b)(1) of  the Act, 21 U  S C  599RCL-0(T)(4), unless the authorization is terminated  or revoked sooner  The test has been validated but independent review by FDA  and CLIA is pending  Test performed using [employer] Gene[employer]pert: This RT-PCR assay targets N2,  a region unique to SARS-CoV-2  A conserved region in the E-gene was chosen  for pan-Sarbecovirus detection which includes SARS-CoV-2  According to CMS-2020-01-R, this platform meets the definition of high-throughput technology      Procalcitonin [[id number]]  (Abnormal) Collected: 01/10/23 2350    Lab Status: Final result Specimen: Blood from Arm, Right Updated: 01/11/23 0047     Procalcitonin 0 42 ng/ml     Comprehensive metabolic panel [[id number]]  (Abnormal) Collected: 01/10/23 2350    Lab Status: Final result Specimen: Blood from Arm, Right Updated: 01/11/23 0043     Sodium 134 mmol/L      Potassium 3 3 mmol/L      Chloride 103 mmol/L      CO2 23 mmol/L      ANION GAP 8 mmol/L      BUN 10 mg/dL      Creatinine 0 57 mg/dL      Glucose 118 mg/dL      Calcium 8 2 mg/dL      AST 16 U/L      ALT 11 U/L      Alkaline Phosphatase 56 U/L      Total Protein 6 5 g/dL      Albumin 3 8 g/dL      Total Bilirubin 0 44 mg/dL      eGFR 120 ml/min/1 73sq m     Narrative:      Michael guidelines for Chronic Kidney Disease (CKD):   •  Stage 1 with normal or high GFR (GFR > 90 mL/min/1 73 square meters)  •  Stage 2 Mild CKD (GFR = 60-89 mL/min/1 73 square meters)  •  Stage 3A Moderate CKD (GFR = 45-59 mL/min/1 73 square meters)  •  Stage 3B Moderate CKD (GFR = 30-44 mL/min/1 73 square meters)  •  Stage 4 Severe CKD (GFR = 15-29 mL/min/1 73 square meters)  •  Stage 5 End Stage CKD (GFR <15 mL/min/1 73 square meters)  Note: GFR calculation is accurate only with a steady state creatinine    Lipase [[id number]]  (Abnormal) Collected: 01/10/23 2350    Lab Status: Final result Specimen: Blood from Arm, Right Updated: 01/11/23 4423 Lipase <6 u/L     Magnesium [[id number]]  (Abnormal) Collected: 01/10/23 2350    Lab Status: Final result Specimen: Blood from Arm, Right Updated: 01/11/23 0043     Magnesium 1 8 mg/dL     Lactic acid [[id number]]  (Normal) Collected: 01/10/23 2350    Lab Status: Final result Specimen: Blood from Arm, Right Updated: 01/11/23 0037     LACTIC ACID 0 7 mmol/L     Narrative:      Result may be elevated if tourniquet was used during collection  Protime-INR [[id number]]  (Normal) Collected: 01/10/23 2350    Lab Status: Final result Specimen: Blood from Arm, Right Updated: 01/11/23 0033     Protime 14 4 seconds      INR 1 10    APTT [[id number]]  (Normal) Collected: 01/10/23 2350    Lab Status: Final result Specimen: Blood from Arm, Right Updated: 01/11/23 0033     PTT 30 seconds     CBC and differential [[id number]]  (Abnormal) Collected: 01/10/23 2350    Lab Status: Final result Specimen: Blood from Arm, Right Updated: 01/11/23 0020     WBC 8 66 Thousand/uL      RBC 3 96 Million/uL      Hemoglobin 11 1 g/dL      Hematocrit 34 9 %      MCV 88 fL      MCH 28 0 pg      MCHC 31 8 g/dL      RDW 14 8 %      MPV 9 2 fL      Platelets 586 Thousands/uL      nRBC 0 /100 WBCs      Neutrophils Relative 83 %      Immat GRANS % 1 %      Lymphocytes Relative 8 %      Monocytes Relative 8 %      Eosinophils Relative 0 %      Basophils Relative 0 %      Neutrophils Absolute 7 24 Thousands/µL      Immature Grans Absolute 0 04 Thousand/uL      Lymphocytes Absolute 0 68 Thousands/µL      Monocytes Absolute 0 69 Thousand/µL      Eosinophils Absolute 0 00 Thousand/µL      Basophils Absolute 0 01 Thousands/µL     Blood culture #1 [[id number]] Collected: 01/10/23 2350    Lab Status: In process Specimen: Blood from Arm, Right Updated: 01/11/23 0010    Blood culture #2 [[id number]] Collected: 01/10/23 2350    Lab Status:  In process Specimen: Blood from Arm, Right Updated: 01/11/23 0010                 CT abdomen pelvis with contrast   Final Result by Milton Zamorano DO (01/11 0210)      Liquid stool in the colon, consider enterocolitis in the appropriate clinical setting  No evidence of bowel obstruction  Workstation performed: [id number]         US right upper quadrant   Final Result by Milton Zamorano DO (01/11 0021)      Unremarkable exam       Workstation performed: [id number]         XR chest 1 view portable   ED Interpretation by Lizet Price MD (01/10 2342)   No evidence of acute cardiopulmonary pathology  Procedures  Procedures      ED Course  ED Course as of 01/11/23 0235   Tue Jeronimo 10, 2023   2244 Ddx includes but not limited to pancreatitis, cholecystitis, gastroenteritis, PUD  2254 Meets SIRS criteria- fever and tachycardia   2358 XR chest 1 view portable  Ordered d/t complaints of cough  No evidence of acute cardiopulmonary etiology  Wed Jan 11, 2023   0035 US negative for biliary pathology  0035 Pt finds some relief in nausea following zofran  0050 Potassium(!): 3 3  Will replete with PO potassium  4452 Magnesium(!): 1 8  Will replete with IV Mg    0051 Lipase(!): <6   0052 Procalcitonin(!): 0 42  Likely indicating bacterial etiology  0058 Temperature: 98 2 °F (36 8 °C)  Improved s/p tylenol and motrin  0059 Pulse(!): 107  Likely fever vs  hypovolemia   0206 Pulse: 96  Improved s/p fluids   0229 Pt states her HA, nausea, and abdominal pain has resolved  0230 Appropriate for d/c home with strict return precautions  0230 Given CT results show enterocolitis, will start abx treatment for likely bacterial etiology  Initial Sepsis Screening     Row Name 01/11/23 0053                Is the patient's history suggestive of a new or worsening infection? Yes (Proceed)  -[address]        Suspected source of infection acute abdominal infection  -[address]        Are two or more of the following signs & symptoms of infection both present and new to the patient?  Yes (Proceed)  -[address] [facility]        Indicate SIRS criteria Hyperthemia > 38 3C (100 9F); Tachycardia > 90 bpm  -MK        If the answer is yes to both questions, suspicion of sepsis is present --        If severe sepsis is present AND tissue hypoperfusion perists in the hour after fluid resuscitation or lactate > 4, the patient meets criteria for SEPTIC SHOCK --        Are any of the following organ dysfunction criteria present within 6 hours of suspected infection and SIRS criteria that are NOT considered to be chronic conditions? No  -[address] [facility]        Organ dysfunction --        Date of presentation of severe sepsis --        Time of presentation of severe sepsis --        Tissue hypoperfusion persists in the hour after crystalloid fluid administration, evidenced, by either: --        Was hypotension present within one hour of the conclusion of crystalloid fluid administration? --        Date of presentation of septic shock --        Time of presentation of septic shock --              User Key  (r) = Recorded By, (t) = Taken By, (c) = Cosigned By    [address] Name Provider Type    [address] [facility] Rigoberto Silver MD Resident                          Medical Decision Making  Gastroenteritis: acute illness or injury  Hypocalcemia: acute illness or injury  Hypokalemia: acute illness or injury  Hypomagnesemia: acute illness or injury  Amount and/or Complexity of Data Reviewed  Labs: ordered  Decision-making details documented in ED Course  Radiology: ordered and independent interpretation performed  Decision-making details documented in ED Course  Risk  OTC drugs  Prescription drug management              Disposition  Final diagnoses:   Gastroenteritis   Hypocalcemia   Hypokalemia   Hypomagnesemia     Time reflects when diagnosis was documented in both MDM as applicable and the Disposition within this note     Time User Action Codes Description Comment    1/11/2023  2:20 AM Naif Blue Add [K52 9] Gastroenteritis     1/11/2023  2:21 AM Ray Bety Balbuena [E83 51] Hypocalcemia     1/11/2023  2:21 AM Sarah President Add [E87 6] Hypokalemia     1/11/2023  2:21 AM Sarah President Add [E83 42] Hypomagnesemia       ED Disposition     ED Disposition   Discharge    Condition   Stable    Date/Time   Wed Jan 11, 2023  2:25 AM    Comment   Ariadne Ellington discharge to home/self care  Follow-up Information     Follow up With Specialties Details Why Contact Info Additional [address] Emergency Department Emergency Medicine Go to  If symptoms worsen [address] Emergency Department, [address], [place], [place], [address]          Patient's Medications   Discharge Prescriptions    CIPROFLOXACIN (CIPRO) 500 MG TABLET    Take 1 tablet (500 mg total) by mouth 2 (two) times a day for 5 days       Start Date: 1/11/2023 End Date: 1/16/2023       Order Dose: 500 mg       Quantity: 10 tablet    Refills: 0    METRONIDAZOLE (FLAGYL) 500 MG TABLET    Take 1 tablet (500 mg total) by mouth every 8 (eight) hours for 5 days       Start Date: 1/11/2023 End Date: 1/16/2023       Order Dose: 500 mg       Quantity: 15 tablet    Refills: 0     No discharge procedures on file  PDMP Review       Value Time User    PDMP Reviewed  Yes 1/10/2023 10:35 PM Sofie Ballesteros MD           ED Provider  Attending physically available and evaluated Ariadne Chandana BROWN managed the patient along with the ED Attending      Electronically Signed by         Norbert Piper MD  01/11/23 0052

## 2023-01-11 NOTE — ED ATTENDING ATTESTATION
1/10/2023  IVeda MD, saw and evaluated the patient  I have discussed the patient with the resident/non-physician practitioner and agree with the resident's/non-physician practitioner's findings, Plan of Care, and MDM as documented in the resident's/non-physician practitioner's note, except where noted  All available labs and Radiology studies were reviewed  I was present for key portions of any procedure(s) performed by the resident/non-physician practitioner and I was immediately available to provide assistance  At this point I agree with the current assessment done in the Emergency Department  I have conducted an independent evaluation of this patient a history and physical is as follows:  Patient is a 30year old female with abdominal pain with N/V/D since last night  (+) mild cough  (+) fever today and headache  No abdominal surgery  No urinary sx  No travel  No raw meat, eggs, fish or recent abx use  Was last seen in this ED on 1/10/23 for N/V and gastroenteritis and had unremarkable urine testing including negative urine HCG  [facility] website checked on this patient and last Rx filled was on 12/16/21 for clonazepam for 20 day supply  NCAT  Mask in place  Tachycardia without murmur  Lungs clear  Abdomen soft with good bowel sounds  RUQ tenderness  No guarding or rebound  No rash or jaundice  No edema  DDx including but not limited to: gastroenteritis, food poisoning, metabolic abnormality, dehydration, JIMENA, mesenteric adenitis, appendicitis, IBD, IBS, ileus, bowel obstruction, toxic megacolon, colitis, enteritis, gastritis, PUD, GERD, hepatitis, pancreatitis, cholecystitis, biliary colic, choledocholithiasis, doubt splenic etiology; renal colic, pyelonephritis, UTI  DDx including but not limited to: viral illness, pneumonia, URI,  influenza, COVID-19 (novel coronavirus); doubt meningitis, meningococcemia, sinusitis  Will check labs and US       ED Course         Critical Care Time  Procedures

## 2023-01-11 NOTE — DISCHARGE INSTRUCTIONS
Return to the ER if you develop: Worsening abdominal pain, prolonged fever, worsening vomiting/diarrhea, inability to take your oral antibiotics

## 2023-01-15 LAB
BACTERIA BLD CULT: NORMAL
BACTERIA BLD CULT: NORMAL

## 2023-01-16 LAB
BACTERIA BLD CULT: NORMAL
BACTERIA BLD CULT: NORMAL

## 2023-01-22 NOTE — PROGRESS NOTES
Assessment/Plan:  Start birth control on day one of next menses and use back up method of contraception x 7 days  Take as prescribed  Rx sent to pharmacy on file  Condom use encouraged for prevention of sexually transmitted infections  Benefits, risks and alternatives of birth control discussed  She denies cigarette smoking, high blood pressure, a history of DVT, known thrombogenic mutations, migraines with aura, breast cancer, or liver disease  There are some drugs (such as certain anticonvulsants and antibiotics) that may decrease the contraceptive efficacy of OCPs, and she should call our office to confirm or use a backup method of contraception if taking these drugs  Expect irregular bleeding for the first 3 months  ACHES reviewed  Exercise 150 minutes per week  minimum  Consider starting prenatal vitamin or multivitamin with folic acid 3-46 months before considering conceiving  Return to office in 3 month (before birth control runs out) for follow up and annual exam         1  Encounter for prescription of oral contraceptives  -     levonorgestrel-ethinyl estradiol (Aviane) 0 1-20 MG-MCG per tablet; Take 1 tablet by mouth daily               Subjective:      Patient ID: Beverly Sol is a 30 y o  female  HPI    Beverly Sol is a 30 y o  Agustina Punt female who is here today to discuss birth control options  Monthly menses x 5-7 days with mod flow  Menses is acceptable  Beverly Sol is sexually active with male partner of 7 years  Denies  bleeding or dryness  Does admit to insertional dyspareunia that is improving with PF PT  She is  monogamous  She uses condoms  for contraception but would like to start a birth control method  Exercising 5 times per week  Non smoker  She is not interested in STD screening today  She denies vaginal discharge, itching, pelvic pain       The following portions of the patient's history were reviewed and updated as appropriate: allergies, current medications, past family history, past medical history, past social history, past surgical history and problem list     Review of Systems   Constitutional: Negative  Eyes: Negative for visual disturbance  Respiratory: Negative for chest tightness and shortness of breath  Cardiovascular: Negative for chest pain, palpitations and leg swelling  Gastrointestinal: Negative for abdominal pain, constipation, diarrhea, nausea and vomiting  Genitourinary: Positive for dyspareunia  Negative for dysuria, flank pain, menstrual problem, vaginal bleeding and vaginal discharge  Skin: Negative  Neurological: Negative for weakness, light-headedness and headaches  Psychiatric/Behavioral: Negative  All other systems reviewed and are negative  Objective:      /80 (BP Location: Left arm, Patient Position: Sitting, Cuff Size: Standard)   Ht 4' 11" (1 499 m)   Wt 81 5 kg (179 lb 9 6 oz)   LMP 01/22/2023 (Exact Date)   BMI 36 27 kg/m²          Physical Exam  Vitals and nursing note reviewed  Constitutional:       Appearance: Normal appearance  She is well-developed  Neck:      Thyroid: No thyromegaly  Trachea: No tracheal deviation  Cardiovascular:      Rate and Rhythm: Normal rate and regular rhythm  Heart sounds: Normal heart sounds  Pulmonary:      Effort: Pulmonary effort is normal       Breath sounds: Normal breath sounds  Abdominal:      General: There is no distension  Palpations: Abdomen is soft  There is no mass  Tenderness: There is no abdominal tenderness  There is no guarding or rebound  Musculoskeletal:      Cervical back: Normal range of motion  Lymphadenopathy:      Cervical: No cervical adenopathy  Skin:     General: Skin is warm and dry  Neurological:      Mental Status: She is alert and oriented to person, place, and time     Psychiatric:         Mood and Affect: Mood normal          Behavior: Behavior normal

## 2023-01-23 ENCOUNTER — OFFICE VISIT (OUTPATIENT)
Dept: OBGYN CLINIC | Facility: CLINIC | Age: 38
End: 2023-01-23

## 2023-01-23 VITALS
DIASTOLIC BLOOD PRESSURE: 80 MMHG | SYSTOLIC BLOOD PRESSURE: 108 MMHG | WEIGHT: 179.6 LBS | BODY MASS INDEX: 36.21 KG/M2 | HEIGHT: 59 IN

## 2023-01-23 DIAGNOSIS — Z30.011 ENCOUNTER FOR PRESCRIPTION OF ORAL CONTRACEPTIVES: Primary | ICD-10-CM

## 2023-01-23 RX ORDER — LEVONORGESTREL AND ETHINYL ESTRADIOL 0.1-0.02MG
1 KIT ORAL DAILY
Qty: 28 TABLET | Refills: 2 | Status: SHIPPED | OUTPATIENT
Start: 2023-01-23

## 2023-01-23 NOTE — PATIENT INSTRUCTIONS
Start birth control on day one of next menses and use back up method of contraception x 7 days  Take as prescribed  Rx sent to pharmacy on file  Condom use encouraged for prevention of sexually transmitted infections  Benefits, risks and alternatives of birth control discussed  She denies cigarette smoking, high blood pressure, a history of DVT, known thrombogenic mutations, migraines with aura, breast cancer, or liver disease  There are some drugs (such as certain anticonvulsants and antibiotics) that may decrease the contraceptive efficacy of OCPs, and she should call our office to confirm or use a backup method of contraception if taking these drugs  Expect irregular bleeding for the first 3 months  ACHES reviewed  Exercise 150 minutes per week  minimum  Consider starting prenatal vitamin or multivitamin with folic acid 0-85 months before considering conceiving     Return to office in 3 month (before birth control runs out) for follow up and annual exam

## 2023-01-24 ENCOUNTER — OFFICE VISIT (OUTPATIENT)
Dept: BARIATRICS | Facility: CLINIC | Age: 38
End: 2023-01-24

## 2023-01-24 VITALS
SYSTOLIC BLOOD PRESSURE: 104 MMHG | DIASTOLIC BLOOD PRESSURE: 64 MMHG | HEART RATE: 98 BPM | BODY MASS INDEX: 35.1 KG/M2 | HEIGHT: 60 IN | WEIGHT: 178.8 LBS

## 2023-01-24 DIAGNOSIS — E66.9 OBESITY, CLASS II, BMI 35-39.9: Primary | ICD-10-CM

## 2023-01-24 NOTE — PROGRESS NOTES
Assessment/Plan:  SANJUANITA was seen today for follow-up  Diagnoses and all orders for this visit:    Obesity, Class II, BMI 35-39 9  -     ECG 12 lead; Future    Patient has had weight loss since her last visit  Has met with the dietitian  Has made dietary changes to increase protein consumption  Interested in medication  It appears that antiobesity medication is not covered however patient can try phentermine as the medication is generic and affordable  Potential side effects of phentermine discussed  Patient is agreeable to taking the medication  Denies cardiac history  May worsen patient's anxiety therefore patient will monitor symptoms and call the office if anxiety worsens  Check an EKG  If EKG unremarkable then phentermine will be faxed to patient's pharmacy  Goals:  Food log (ie ) www myfitnesspal com,sparkpeople  com,loseit com,calorieking  com,etc  , No sugary beverages  At least 64oz of water daily  , Increase physical activity by 10 minutes daily and Gradually increase physical activity to a goal of 5 days per week for 30 minutes of MODERATE intensity PLUS 2 days per week of FULL BODY resistance training      Follow up in approximately 1 month with Non-Surgical Physician/Advanced Practitioner  Subjective:   Chief Complaint   Patient presents with   • Follow-up     Pt is here today for MWM f/u  Patient ID: Deangelo Gomez  is a 30 y o  female with excess weight/obesity here to pursue weight management  Patient is pursuing Conservative Program    Most recent notes and records were reviewed  Patient presents for medical weight management follow-up  Since her last visit she did meet with the dietitian  Has switched from regular past to chickpea pasta which has more protein  Also eating a yogurt that has more protein as well    Patient would be interested in medication to help reduce her appetite           -Initial weight loss goal of 5-10% weight loss for improved health  Wt Readings from Last 10 Encounters:   01/24/23 81 1 kg (178 lb 12 8 oz)   01/23/23 81 5 kg (179 lb 9 6 oz)   11/01/22 82 2 kg (181 lb 3 2 oz)   10/24/22 81 7 kg (180 lb 3 2 oz)   06/28/22 78 9 kg (174 lb)   06/06/22 79 kg (174 lb 3 2 oz)   05/26/22 78 kg (172 lb)   04/11/22 76 3 kg (168 lb 3 2 oz)   10/04/21 71 8 kg (158 lb 3 2 oz)   03/15/21 66 7 kg (147 lb)     Initial weight: 174 2lbs  Current weight: 178 8lbs (-1 4lbs from last MWM OV)  Change in weight: +4 6lbs          The following portions of the patient's history were reviewed and updated as appropriate: allergies, current medications, past family history, past medical history, past social history, past surgical history, and problem list     Review of Systems   Constitutional: Negative for fever  Respiratory: Negative for shortness of breath  Cardiovascular: Negative for chest pain  Objective:  /64 (BP Location: Left arm, Patient Position: Sitting, Cuff Size: Large)   Pulse 98   Ht 4' 11 5" (1 511 m)   Wt 81 1 kg (178 lb 12 8 oz)   LMP 01/22/2023 (Exact Date)   BMI 35 51 kg/m²   Constitutional: Well-developed, well-nourished and Obese Body mass index is 35 51 kg/m²  Roanna Schlatter HEENT: No conjunctival injection  Pulmonary: No increased work of breathing or signs of respiratory distress  CV: Well-perfused  GI: Obese  Non-distended  MSK: No edema   Neuro: Oriented to person, place and time  Normal Speech  Normal gait  Psych: Normal affect and mood  Labs and Imaging  Recent labs and imaging have been personally reviewed    Lab Results   Component Value Date    WBC 8 66 01/10/2023    HGB 11 1 (L) 01/10/2023    HCT 34 9 01/10/2023    MCV 88 01/10/2023     01/10/2023     Lab Results   Component Value Date    SODIUM 134 (L) 01/10/2023    K 3 3 (L) 01/10/2023     01/10/2023    CO2 23 01/10/2023    AGAP 8 01/10/2023    BUN 10 01/10/2023    CREATININE 0 57 (L) 01/10/2023    GLUC 118 01/10/2023    CALCIUM 8 2 (L) 01/10/2023    AST 16 01/10/2023    ALT 11 01/10/2023    ALKPHOS 56 01/10/2023    TP 6 5 01/10/2023    TBILI 0 44 01/10/2023    EGFR 120 01/10/2023     Lab Results   Component Value Date    HGBA1C 5 7 (H) 06/07/2022     No results found for: [id number], TSH  No results found for: CHOLESTEROL  No results found for: HDL  No results found for: TRIG  No results found for: [facility]

## 2023-01-30 ENCOUNTER — OFFICE VISIT (OUTPATIENT)
Dept: LAB | Facility: CLINIC | Age: 38
End: 2023-01-30

## 2023-01-30 DIAGNOSIS — E66.9 OBESITY, CLASS II, BMI 35-39.9: ICD-10-CM

## 2023-01-30 LAB
CHOLEST SERPL-MCNC: 184 MG/DL
CHOLEST/HDLC SERPL: 2.2 (CALC)
HBA1C MFR BLD: 5.4 % OF TOTAL HGB
HDLC SERPL-MCNC: 84 MG/DL
LDLC SERPL CALC-MCNC: 86 MG/DL (CALC)
NONHDLC SERPL-MCNC: 100 MG/DL (CALC)
TRIGL SERPL-MCNC: 56 MG/DL

## 2023-01-31 LAB
ATRIAL RATE: 78 BPM
P AXIS: 42 DEGREES
PR INTERVAL: 124 MS
QRS AXIS: 38 DEGREES
QRSD INTERVAL: 76 MS
QT INTERVAL: 372 MS
QTC INTERVAL: 424 MS
T WAVE AXIS: 22 DEGREES
VENTRICULAR RATE: 78 BPM

## 2023-02-01 ENCOUNTER — TELEPHONE (OUTPATIENT)
Dept: BARIATRICS | Facility: CLINIC | Age: 38
End: 2023-02-01

## 2023-02-01 DIAGNOSIS — E66.9 OBESITY, CLASS II, BMI 35-39.9: Primary | ICD-10-CM

## 2023-02-01 NOTE — TELEPHONE ENCOUNTER
Patient called in today in regards to her EKG being complete and requesting that phentermine be sent to her pharmacy per your last office visit

## 2023-02-02 RX ORDER — PHENTERMINE HYDROCHLORIDE 15 MG/1
15 CAPSULE ORAL EVERY MORNING
Qty: 30 CAPSULE | Refills: 0 | Status: SHIPPED | OUTPATIENT
Start: 2023-02-02

## 2023-02-02 NOTE — TELEPHONE ENCOUNTER
Spoke with patient and advised her that medication was sent to her pharmacy and she should be able to pick that up later today  Patient was scheduled for f/u with Dr Talha Decker on 3/2/23  Patient advised to call with any questions/concerns  Patient verbalized understanding

## 2023-02-02 NOTE — TELEPHONE ENCOUNTER
Phentermine 15 mg daily faxed  Pt will need a follow up in 4wks  Medication not likely covered by insurance however patient can use a discount program like [employer] to get the medication at a lower cost   Phentermine has as potential side effects of increased heart rate, increased blood pressure, palpitations, headache, dizziness, insomnia, altered mood, abdominal upset, and dry mouth  Notify the provider with change in mood  ER with thoughts of harming yourself or others  Phentermine should be stopped prior to surgery   Notify the provider with any changes in vision

## 2023-02-13 DIAGNOSIS — Z30.011 ENCOUNTER FOR PRESCRIPTION OF ORAL CONTRACEPTIVES: ICD-10-CM

## 2023-02-13 RX ORDER — LEVONORGESTREL AND ETHINYL ESTRADIOL 0.1-0.02MG
KIT ORAL
Qty: 84 TABLET | Refills: 0 | Status: SHIPPED | OUTPATIENT
Start: 2023-02-13

## 2023-02-21 ENCOUNTER — OFFICE VISIT (OUTPATIENT)
Dept: FAMILY MEDICINE CLINIC | Facility: CLINIC | Age: 38
End: 2023-02-21

## 2023-02-21 VITALS
SYSTOLIC BLOOD PRESSURE: 112 MMHG | RESPIRATION RATE: 16 BRPM | OXYGEN SATURATION: 97 % | DIASTOLIC BLOOD PRESSURE: 84 MMHG | WEIGHT: 172.8 LBS | BODY MASS INDEX: 34.32 KG/M2 | HEART RATE: 57 BPM

## 2023-02-21 DIAGNOSIS — K21.9 CHRONIC GERD: ICD-10-CM

## 2023-02-21 DIAGNOSIS — F32.A DEPRESSION, UNSPECIFIED DEPRESSION TYPE: ICD-10-CM

## 2023-02-21 DIAGNOSIS — Z23 ENCOUNTER FOR IMMUNIZATION: ICD-10-CM

## 2023-02-21 DIAGNOSIS — E78.5 HYPERLIPIDEMIA, UNSPECIFIED HYPERLIPIDEMIA TYPE: ICD-10-CM

## 2023-02-21 DIAGNOSIS — L70.0 ACNE VULGARIS: Primary | ICD-10-CM

## 2023-02-21 PROBLEM — M54.50 BILATERAL LOW BACK PAIN WITHOUT SCIATICA: Status: RESOLVED | Noted: 2019-10-21 | Resolved: 2023-02-21

## 2023-02-21 RX ORDER — CLINDAMYCIN PHOSPHATE 11.9 MG/ML
SOLUTION TOPICAL 2 TIMES DAILY
Qty: 30 ML | Refills: 1 | Status: SHIPPED | OUTPATIENT
Start: 2023-02-21

## 2023-02-21 NOTE — ASSESSMENT & PLAN NOTE
Since she has been on birth control pills she says her acne is more prominent and she needs a refill on clindamycin the prescription is sent to be used as needed

## 2023-02-21 NOTE — ASSESSMENT & PLAN NOTE
Her cholesterol is normal now, she is doing exercise eating healthy low-fat diet and she is on phentermine from weight management and she lost some weight, advised to have her complete blood work once a year now

## 2023-02-21 NOTE — PROGRESS NOTES
Name: Nica Griffin      : 1985      MRN: [id number]  Encounter Provider: Shabnam Hodges MD  Encounter Date: 2023   Encounter department: [facility]     1  Acne vulgaris  Assessment & Plan:  Since she has been on birth control pills she says her acne is more prominent and she needs a refill on clindamycin the prescription is sent to be used as needed    Orders:  -     clindamycin (CLEOCIN T) 1 % external solution; Apply topically 2 (two) times a day    2  Depression, unspecified depression type  Assessment & Plan:  Depression is well controlled and she follows psychiatrist and she is on Celexa and Wellbutrin      3  Encounter for immunization  -     TDAP VACCINE GREATER THAN OR EQUAL TO 6YO IM    4  Chronic GERD  Assessment & Plan:  Acid reflux is under control with the omeprazole and she will continue the same      5  Hyperlipidemia, unspecified hyperlipidemia type  Assessment & Plan:  Her cholesterol is normal now, she is doing exercise eating healthy low-fat diet and she is on phentermine from weight management and she lost some weight, advised to have her complete blood work once a year now             Subjective     She is here for follow-up, she says since she has been on birth control pills she sometimes get acne and she needs a refill on clindamycin which was working fine for her in the past,  [address] gynecologist, she has history of polycystic ovaries and hirsutism which is stable,  She also has goes to weight management and now she is on phentermine and she says it disturb her sleep but she is doing well with some weight loss    Review of Systems   Constitutional: Negative for activity change, appetite change, chills, fatigue, fever and unexpected weight change  HENT: Negative for congestion, ear discharge, ear pain, nosebleeds, postnasal drip, rhinorrhea, sinus pressure, sneezing, sore throat, trouble swallowing and voice change      Eyes: Negative for photophobia, pain, discharge, redness and itching  Respiratory: Negative for cough, chest tightness, shortness of breath and wheezing  Cardiovascular: Negative for chest pain, palpitations and leg swelling  Gastrointestinal: Negative for abdominal pain, constipation, diarrhea, nausea and vomiting  Endocrine: Negative for polyuria  Genitourinary: Negative for dysuria, frequency and urgency  Musculoskeletal: Negative for arthralgias, back pain, myalgias and neck pain  Skin: Negative for color change, pallor and rash  Allergic/Immunologic: Negative for environmental allergies and food allergies  Neurological: Negative for dizziness, weakness, light-headedness and headaches  Hematological: Negative for adenopathy  Does not bruise/bleed easily  Psychiatric/Behavioral: Negative for behavioral problems  The patient is not nervous/anxious  Depression Screening Follow-up Plan: Patient's depression screening was positive with a PHQ-2 score of   Their PHQ-9 score was 0  Continue regular follow-up with their psychologist/therapist/psychiatrist who is managing their mental health condition(s)  Past Medical History:   Diagnosis Date   • Abnormal weight gain 03/23/2006   • Acne 03/23/2006   • Depression    • Dysfunction of both eustachian tubes     last assessed 12/21/17   • History of PCOS    • Hyperlipidemia    • Polycystic ovarian syndrome 01/30/2009   • Severe single current episode of major depressive disorder, without psychotic features [facility]     last assessed 06/21/16   • Suicidal ideation     last assessed 10/24/17     Past Surgical History:   Procedure Laterality Date   • NM ESOPHAGOGASTRODUODENOSCOPY TRANSORAL DIAGNOSTIC N/A 10/24/2018    Procedure: ESOPHAGOGASTRODUODENOSCOPY (EGD); Surgeon: René Yao MD;  Location: [facility];   Service: Gastroenterology   • WISDOM TOOTH EXTRACTION       Family History   Problem Relation Age of Onset   • Depression Mother    • Colorectal Cancer Mother 72   • Basal cell carcinoma Father    • Breast cancer Cousin    • Colon cancer Maternal Uncle    • Ovarian cancer Neg Hx      Social History     Socioeconomic History   • Marital status: Single     Spouse name: None   • Number of children: None   • Years of education: None   • Highest education level: None   Occupational History   • None   Tobacco Use   • Smoking status: Never   • Smokeless tobacco: Never   Vaping Use   • Vaping Use: Never used   Substance and Sexual Activity   • Alcohol use: Yes     Comment: rare alcohol use   • Drug use: No   • Sexual activity: Yes     Partners: Male     Birth control/protection: OCP   Other Topics Concern   • None   Social History Narrative    Caffeine use    Occupation     Social Determinants of Health     Financial Resource Strain: Not on file   Food Insecurity: Not on file   Transportation Needs: Not on file   Physical Activity: Not on file   Stress: Not on file   Social Connections: Not on file   Intimate Partner Violence: Not on file   Housing Stability: Not on file     Current Outpatient Medications on File Prior to Visit   Medication Sig   • buPROPion (WELLBUTRIN XL) 300 mg 24 hr tablet Take 300 mg by mouth daily   • citalopram (CeleXA) 20 mg tablet Take 20 mg by mouth daily at bedtime   • omeprazole (PriLOSEC) 20 mg delayed release capsule Take 20 mg by mouth daily   • phentermine 15 MG capsule Take 1 capsule (15 mg total) by mouth every morning   • Vienva 0 1-20 MG-MCG per tablet TAKE 1 TABLET BY MOUTH EVERY DAY   • [DISCONTINUED] clindamycin (CLEOCIN T) 1 % external solution Apply topically 2 (two) times a day   • [DISCONTINUED] ondansetron (Zofran) 4 mg tablet Take 1 tablet (4 mg total) by mouth every 8 (eight) hours as needed for nausea or vomiting for up to 7 days (Patient not taking: Reported on 1/23/2023)     Allergies   Allergen Reactions   • Amoxicillin GI Intolerance     Immunization History   Administered Date(s) Administered   • COVID-19 PFIZER VACCINE 0 3 ML IM 03/01/2021, 03/22/2021   • DTaP 5 1985, 1985, 1985, 11/05/1988, 07/24/1992   • H1N1, All Formulations 12/16/2009   • Hep B, adult 08/07/2003, 08/25/2003, 04/01/2004   • Hib (HbOC) 10/22/1988   • INFLUENZA 11/06/2019, 12/03/2020   • IPV 1985, 1985, 01/09/1988, 11/05/1988   • Influenza Quadrivalent Preservative Free 3 years and older IM 12/03/2020   • MMR 07/30/1988, 07/24/1992   • Td (adult), adsorbed 08/06/2003       Objective     /84 (BP Location: Right arm, Patient Position: Sitting, Cuff Size: Large)   Pulse 57   Resp 16   Wt 78 4 kg (172 lb 12 8 oz)   LMP 01/22/2023 (Exact Date)   SpO2 97%   BMI 34 32 kg/m²     Physical Exam  Vitals and nursing note reviewed  Constitutional:       Appearance: Normal appearance  She is well-developed  She is not ill-appearing  HENT:      Head: Normocephalic and atraumatic  Mouth/Throat:      Pharynx: No oropharyngeal exudate  Eyes:      General: No scleral icterus  Right eye: No discharge  Left eye: No discharge  Conjunctiva/sclera: Conjunctivae normal       Pupils: Pupils are equal, round, and reactive to light  Neck:      Thyroid: No thyromegaly  Trachea: No tracheal deviation  Cardiovascular:      Rate and Rhythm: Normal rate and regular rhythm  Heart sounds: Normal heart sounds  No murmur heard  Pulmonary:      Effort: Pulmonary effort is normal  No respiratory distress  Breath sounds: Normal breath sounds  No wheezing or rales  Abdominal:      General: Bowel sounds are normal  There is no distension  Palpations: Abdomen is soft  There is no mass  Tenderness: There is no abdominal tenderness  There is no rebound  Musculoskeletal:         General: Normal range of motion  Cervical back: Normal range of motion and neck supple  Lymphadenopathy:      Cervical: No cervical adenopathy  Skin:     General: Skin is warm        Coloration: Skin is not pale       Findings: No erythema or rash  Neurological:      Mental Status: She is alert and oriented to person, place, and time  Cranial Nerves: No cranial nerve deficit  Deep Tendon Reflexes: Reflexes are normal and symmetric  Psychiatric:         Behavior: Behavior normal          Thought Content:  Thought content normal          Judgment: Judgment normal        Myles Krishnamurthy MD

## 2023-02-22 ENCOUNTER — TELEPHONE (OUTPATIENT)
Dept: BARIATRICS | Facility: CLINIC | Age: 38
End: 2023-02-22

## 2023-02-28 ENCOUNTER — OFFICE VISIT (OUTPATIENT)
Dept: BARIATRICS | Facility: CLINIC | Age: 38
End: 2023-02-28

## 2023-02-28 VITALS
HEIGHT: 60 IN | BODY MASS INDEX: 34.51 KG/M2 | SYSTOLIC BLOOD PRESSURE: 108 MMHG | HEART RATE: 90 BPM | DIASTOLIC BLOOD PRESSURE: 84 MMHG | WEIGHT: 175.8 LBS

## 2023-02-28 DIAGNOSIS — E66.9 OBESITY, CLASS II, BMI 35-39.9: Primary | ICD-10-CM

## 2023-02-28 DIAGNOSIS — E66.9 OBESITY, CLASS I, BMI 30-34.9: ICD-10-CM

## 2023-02-28 RX ORDER — PHENTERMINE HYDROCHLORIDE 15 MG/1
15 CAPSULE ORAL EVERY MORNING
Qty: 30 CAPSULE | Refills: 0 | Status: SHIPPED | OUTPATIENT
Start: 2023-02-28

## 2023-02-28 NOTE — PROGRESS NOTES
Assessment/Plan:  Juan C Barraza was seen today for follow-up  Diagnoses and all orders for this visit:    Obesity, Class II, BMI 35-39 9  -     phentermine 15 MG capsule; Take 1 capsule (15 mg total) by mouth every morning    Obesity, Class I, BMI 30-34 9  -     phentermine 15 MG capsule; Take 1 capsule (15 mg total) by mouth every morning    Currently pursuing conservative program   Taking phentermine 15 mg daily as well  Having some insomnia and worsening anxiety but overall tolerable  I did encourage patient to try taking the medication earlier in the morning such as 7 AM as this may help with her insomnia  Patient has had weight loss since her last visit  Medication will be continued and refill provided today  Prior to prescribing the controlled substance, a patient search was performed on the [place] prescription drug monitoring program web site and NO Red Flags identified  Heart rate in the tachycardic range on initial measurement however improved to normal limits by the end of the visit  Other recommendations as below  Goals:  Food log (ie ) www myfitnesspal com,sparkpeople  com,loseit com,calorieking  com,etc  , No sugary beverages  At least 64oz of water daily  , Increase physical activity by 10 minutes daily and Gradually increase physical activity to a goal of 5 days per week for 30 minutes of MODERATE intensity PLUS 2 days per week of FULL BODY resistance training        Follow up in approximately 1 month with Non-Surgical Physician/Advanced Practitioner  Subjective:   Chief Complaint   Patient presents with   • Follow-up     Pt is here for MWM f/u       Patient ID: Lance Walker  is a 30 y o  female with excess weight/obesity here to pursue weight management  Patient is pursuing Conservative Program    Most recent notes and records were reviewed  Patient presents for medical weight management follow-up  Currently on phentermine 15 mg daily    Denies palpitations, chest pain or headache  Does have insomnia and some increase in anxiety but symptoms are tolerable  Patient only takes the medication at 8:00 in the morning  Needs a refill     -Initial weight loss goal of 5-10% weight loss for improved health  Wt Readings from Last 10 Encounters:   02/28/23 79 7 kg (175 lb 12 8 oz)   02/21/23 78 4 kg (172 lb 12 8 oz)   01/24/23 81 1 kg (178 lb 12 8 oz)   01/23/23 81 5 kg (179 lb 9 6 oz)   11/01/22 82 2 kg (181 lb 3 2 oz)   10/24/22 81 7 kg (180 lb 3 2 oz)   06/28/22 78 9 kg (174 lb)   06/06/22 79 kg (174 lb 3 2 oz)   05/26/22 78 kg (172 lb)   04/11/22 76 3 kg (168 lb 3 2 oz)     Initial weight: 174 2lbs  Current weight: 175 8lbs (-3lbs since last MWM OV)  Change in weight: +1 6lbs          The following portions of the patient's history were reviewed and updated as appropriate: allergies, current medications, past family history, past medical history, past social history, past surgical history, and problem list     Review of Systems   Constitutional: Negative for fever  Respiratory: Negative for shortness of breath  Cardiovascular: Negative for chest pain and palpitations  Objective:  /84   Pulse 90   Ht 4' 11 5" (1 511 m)   Wt 79 7 kg (175 lb 12 8 oz)   LMP 02/15/2023 (Approximate)   BMI 34 91 kg/m²   Constitutional: Well-developed, well-nourished and Obese Body mass index is 34 91 kg/m²  Frutoso Spatz HEENT: No conjunctival injection  Pulmonary: No increased work of breathing or signs of respiratory distress  CV: Well-perfused  GI: Obese  Non-distended  MSK: No edema   Neuro: Oriented to person, place and time  Normal Speech  Normal gait  Psych: Normal affect and mood  Labs and Imaging  Recent labs and imaging have been personally reviewed    Lab Results   Component Value Date    WBC 8 66 01/10/2023    HGB 11 1 (L) 01/10/2023    HCT 34 9 01/10/2023    MCV 88 01/10/2023     01/10/2023     Lab Results   Component Value Date    SODIUM 134 (L) 01/10/2023    K 3 3 (L) 01/10/2023     01/10/2023    CO2 23 01/10/2023    AGAP 8 01/10/2023    BUN 10 01/10/2023    CREATININE 0 57 (L) 01/10/2023    GLUC 118 01/10/2023    CALCIUM 8 2 (L) 01/10/2023    AST 16 01/10/2023    ALT 11 01/10/2023    ALKPHOS 56 01/10/2023    TP 6 5 01/10/2023    TBILI 0 44 01/10/2023    EGFR 120 01/10/2023     Lab Results   Component Value Date    HGBA1C 5 4 01/30/2023     No results found for: Son Benitez TSH  Lab Results   Component Value Date    CHOLESTEROL 184 01/30/2023     Lab Results   Component Value Date    HDL 84 01/30/2023     Lab Results   Component Value Date    TRIG 56 01/30/2023     Lab Results   Component Value Date    LDLCALC 86 01/30/2023

## 2023-03-28 ENCOUNTER — OFFICE VISIT (OUTPATIENT)
Dept: BARIATRICS | Facility: CLINIC | Age: 38
End: 2023-03-28

## 2023-03-28 VITALS
BODY MASS INDEX: 34.08 KG/M2 | DIASTOLIC BLOOD PRESSURE: 70 MMHG | HEIGHT: 60 IN | SYSTOLIC BLOOD PRESSURE: 110 MMHG | HEART RATE: 98 BPM | WEIGHT: 173.6 LBS

## 2023-03-28 DIAGNOSIS — E66.9 OBESITY, CLASS I, BMI 30-34.9: ICD-10-CM

## 2023-03-28 DIAGNOSIS — E66.9 OBESITY, CLASS II, BMI 35-39.9: Primary | ICD-10-CM

## 2023-03-28 RX ORDER — DIPHENHYDRAMINE HCL 25 MG
25 TABLET ORAL EVERY 6 HOURS PRN
COMMUNITY

## 2023-03-28 RX ORDER — PHENTERMINE HYDROCHLORIDE 15 MG/1
15 CAPSULE ORAL EVERY MORNING
Qty: 30 CAPSULE | Refills: 1 | Status: SHIPPED | OUTPATIENT
Start: 2023-03-28

## 2023-03-28 NOTE — PROGRESS NOTES
Assessment/Plan:  Markus Lowe was seen today for follow-up  Diagnoses and all orders for this visit:    Obesity, Class II, BMI 35-39 9  -     phentermine 15 MG capsule; Take 1 capsule (15 mg total) by mouth every morning    Obesity, Class I, BMI 30-34 9  -     phentermine 15 MG capsule; Take 1 capsule (15 mg total) by mouth every morning    Currently following conservative program   Patient is on phentermine as well 15 mg daily  Has lost weight since her last visit  Overall since starting phentermine patient has lost approximately 2 9% of her body weight  I did advise against skipping meals  I did also advise against large portions when eating out  I informed patient that the key to long-term and meaningful weight loss is improvement in nutrition  I informed patient that when eating out many meals can easily be over 1000 isamar   I advised patient to be more mindful of what she orders when she goes out and to select food items that are smaller in portion or perhaps even choosing the salad options  In response to the skip meals I advised patient at least have a protein shake for lunch with a piece of fruit  Phentermine refilled today  Prior to prescribing the controlled substance, a patient search was performed on the [place] prescription drug monitoring program web site and NO Red Flags identified  Goals:  Food log (ie ) www myfitnesspal com,sparkpeople  com,[employer] com,calorieking  com,etc  , No sugary beverages  At least 64oz of water daily  , Increase physical activity by 10 minutes daily and Gradually increase physical activity to a goal of 5 days per week for 30 minutes of MODERATE intensity PLUS 2 days per week of FULL BODY resistance training      Follow up in approximately 2 months with Non-Surgical Physician/Advanced Practitioner      Subjective:   Chief Complaint   Patient presents with   • Follow-up     Pt is here for MWM f/u       Patient ID: Tawana Alarcon  is a 30 y o  female with excess "weight/obesity here to pursue weight management  Patient is pursuing Conservative Program    Most recent notes and records were reviewed  Patient presents for medical weight management follow-up  Currently on phentermine 15 mg daily  Continues to have some insomnia and some worsening of anxiety but both have been tolerable  Otherwise tolerating medication well without any chest pain, palpitations or headache  Does need a refill  Patient does inform me she has been skipping meals on the weekends  Usually skips lunch as she goes out for dinner and is eating large meals     -Initial weight loss goal of 5-10% weight loss for improved health  Wt Readings from Last 10 Encounters:   03/28/23 78 7 kg (173 lb 9 6 oz)   02/28/23 79 7 kg (175 lb 12 8 oz)   02/21/23 78 4 kg (172 lb 12 8 oz)   01/24/23 81 1 kg (178 lb 12 8 oz)   01/23/23 81 5 kg (179 lb 9 6 oz)   11/01/22 82 2 kg (181 lb 3 2 oz)   10/24/22 81 7 kg (180 lb 3 2 oz)   06/28/22 78 9 kg (174 lb)   06/06/22 79 kg (174 lb 3 2 oz)   05/26/22 78 kg (172 lb)     Initial weight: 174 2lbs (178 8lbs at start of phentermine)  Current weight:  173 6lbs (-2 2lbs since last MWM OV)  Change in weight: -0 6lbs (-5 2lbs since start of phentermine)          The following portions of the patient's history were reviewed and updated as appropriate: allergies, current medications, past family history, past medical history, past social history, past surgical history, and problem list     Review of Systems   Constitutional: Negative for fever  Respiratory: Negative for shortness of breath  Cardiovascular: Negative for chest pain and palpitations  Objective:  /70   Pulse 98   Ht 4' 11 5\" (1 511 m)   Wt 78 7 kg (173 lb 9 6 oz)   LMP 03/15/2023   BMI 34 48 kg/m²   Constitutional: Well-developed, well-nourished and Obese Body mass index is 34 48 kg/m²  Daisy Forde HEENT: No conjunctival injection    Pulmonary: No increased work of breathing or signs of respiratory " distress  CV: Well-perfused  GI: Obese  Non-distended  MSK: No edema   Neuro: Oriented to person, place and time  Normal Speech  Normal gait  Psych: Normal affect and mood  Labs and Imaging  Recent labs and imaging have been personally reviewed    Lab Results   Component Value Date    WBC 8 66 01/10/2023    HGB 11 1 (L) 01/10/2023    HCT 34 9 01/10/2023    MCV 88 01/10/2023     01/10/2023     Lab Results   Component Value Date    SODIUM 134 (L) 01/10/2023    K 3 3 (L) 01/10/2023     01/10/2023    CO2 23 01/10/2023    AGAP 8 01/10/2023    BUN 10 01/10/2023    CREATININE 0 57 (L) 01/10/2023    GLUC 118 01/10/2023    CALCIUM 8 2 (L) 01/10/2023    AST 16 01/10/2023    ALT 11 01/10/2023    ALKPHOS 56 01/10/2023    TP 6 5 01/10/2023    TBILI 0 44 01/10/2023    EGFR 120 01/10/2023     Lab Results   Component Value Date    HGBA1C 5 4 01/30/2023     No results found for: Barbara Phoenix TSH  Lab Results   Component Value Date    CHOLESTEROL 184 01/30/2023     Lab Results   Component Value Date    HDL 84 01/30/2023     Lab Results   Component Value Date    TRIG 56 01/30/2023     Lab Results   Component Value Date    LDLCALC 86 01/30/2023

## 2023-04-26 ENCOUNTER — OFFICE VISIT (OUTPATIENT)
Dept: BARIATRICS | Facility: CLINIC | Age: 38
End: 2023-04-26

## 2023-04-26 VITALS
HEART RATE: 84 BPM | HEIGHT: 60 IN | BODY MASS INDEX: 33.81 KG/M2 | DIASTOLIC BLOOD PRESSURE: 78 MMHG | WEIGHT: 172.2 LBS | SYSTOLIC BLOOD PRESSURE: 110 MMHG

## 2023-04-26 DIAGNOSIS — E66.9 OBESITY, CLASS I, BMI 30-34.9: Primary | ICD-10-CM

## 2023-04-26 DIAGNOSIS — E28.2 PCOS (POLYCYSTIC OVARIAN SYNDROME): ICD-10-CM

## 2023-04-26 DIAGNOSIS — N20.0 RENAL CALCULUS OR STONE: ICD-10-CM

## 2023-04-26 DIAGNOSIS — F41.9 ANXIETY: ICD-10-CM

## 2023-04-26 PROBLEM — E66.811 OBESITY, CLASS I, BMI 30-34.9: Status: ACTIVE | Noted: 2023-04-26

## 2023-04-26 NOTE — ASSESSMENT & PLAN NOTE
Currently on wellbutrin  Has clonazepam at home but rarely takes this medication  Advised that she shouldn't take the clonazepam while on phentermine  Anxiety well controlled

## 2023-04-26 NOTE — ASSESSMENT & PLAN NOTE
Metformin discussed to help with insulin resistance  Patient didn't tolerate in the past but isn't sure if she tried the extended release  May consider in the future

## 2023-04-26 NOTE — PROGRESS NOTES
Assessment/Plan:     Obesity, Class I, BMI 30-34 9  - Patient is pursuing Conservative Program including follow up visits with MWM for medication management  - Initial weight loss goal of 5-10% weight loss for improved health  - Labs reviewed from 2023  - Patient is tolerating phentermine 15mg with tolerable side effects  She will continue with this medication and dose as to not increase adverse effects  Discussed nutrition and will try to get more protein in the morning and try to balance dinner a little better  She will also work on increasing her water intake and activity level  Patient is not a candidate for topiramate due to history of kidney stones  GLP-1RA not covered medications      Goals:  Do not skip meals  Food log via yung -  www  myfitnesspal com  No sugary beverages  At least 64oz of water daily  Increase physical activity by 10 minutes daily  Gradually increase physical activity to a goal of 5 days per week for 30 minutes of MODERATE intensity PLUS 2 days per week of FULL BODY resistance training    PCOS (polycystic ovarian syndrome)  Metformin discussed to help with insulin resistance  Patient didn't tolerate in the past but isn't sure if she tried the extended release  May consider in the future    Renal calculus or stone  Not a candidate for topiramate    Anxiety  Currently on wellbutrin  Has clonazepam at home but rarely takes this medication  Advised that she shouldn't take the clonazepam while on phentermine  Anxiety well controlled         Dash Henry was seen today for follow-up  Diagnoses and all orders for this visit:    Obesity, Class I, BMI 30-34 9    Renal calculus or stone    PCOS (polycystic ovarian syndrome)    Anxiety        Total time spent reviewing chart, interviewing patient, examining patient, discussing plan, answering all questions, and documentin minutes with >50% face-to-face time with the patient      Follow up in approximately 1 month with Non-Surgical Physician/Advanced Practitioner  Subjective:   Chief Complaint   Patient presents with   • Follow-up     Pt is here for MWM f/u       Patient ID: Po De La Fuente  is a 41 y o  female with excess weight/obesity here to pursue weight management  Patient is pursuing Conservative Program    Most recent notes and records were reviewed  HPI    Wt Readings from Last 20 Encounters:   04/26/23 78 1 kg (172 lb 3 2 oz)   04/17/23 78 3 kg (172 lb 9 6 oz)   03/28/23 78 7 kg (173 lb 9 6 oz)   02/28/23 79 7 kg (175 lb 12 8 oz)   02/21/23 78 4 kg (172 lb 12 8 oz)   01/24/23 81 1 kg (178 lb 12 8 oz)   01/23/23 81 5 kg (179 lb 9 6 oz)   11/01/22 82 2 kg (181 lb 3 2 oz)   10/24/22 81 7 kg (180 lb 3 2 oz)   06/28/22 78 9 kg (174 lb)   06/06/22 79 kg (174 lb 3 2 oz)   05/26/22 78 kg (172 lb)   04/11/22 76 3 kg (168 lb 3 2 oz)   10/04/21 71 8 kg (158 lb 3 2 oz)   03/15/21 66 7 kg (147 lb)   11/02/20 62 6 kg (138 lb)   10/26/20 62 6 kg (138 lb)   10/21/19 55 8 kg (123 lb)   07/25/19 54 4 kg (120 lb)   03/06/19 55 4 kg (122 lb 3 2 oz)       Patient presents today to medical weight management office for follow up  Patient is tolerating phentermine 15mg  Experiencing some anxiety and insomnia at times but it is manageable Her HR at home is averaging 78 according to her watch  She is able to better control her cravings and hunger on the medication  Still working on her nutrition - very structured during the day but at dinner time has not come up with a good plan  She will revisit the meal plan that the dietician provided  Had been on metformin in the past and it upset her stomach - may be willing to try again in the future      Weight loss medication and dose: phentermine 15mg  Initial weight: 174 2 lbs (178 8 lbs at start of phentermine)  Current weight:  172 2 lbs (-1 4 lbs since last MWM OV)  Change in weight: -2 lbs (-6 6 lbs since start of phentermine)  Goal: 120 lbs        B- bowl of cereal (cheerios or rice krispies) with "blueberries and small banana and oatmilk, black tea  L- yogurt with unsweetened apple sauce, 2 cheese sticks  S- humus with baby carrots  D- huevos ranceros with 2 eggs, pasta with red sauce, canned peas and carrots, sandwich with baby spinach  S- popcorn    Hydration- black tea in the morning, water - 4 cups  Alcohol- rarely  Exercise- 30 minutes 5 days a week and 15 minute walk with dog        The following portions of the patient's history were reviewed and updated as appropriate: allergies, current medications, past family history, past medical history, past social history, past surgical history, and problem list     Family History   Problem Relation Age of Onset   • Depression Mother    • Colorectal Cancer Mother 72   • Basal cell carcinoma Father    • No Known Problems Brother    • Colon cancer Maternal Uncle    • Breast cancer Cousin    • Ovarian cancer Neg Hx         Review of Systems   Constitutional: Negative for fatigue  HENT: Negative for sore throat  Respiratory: Negative for cough and shortness of breath  Cardiovascular: Negative for chest pain, palpitations and leg swelling  Gastrointestinal: Negative for abdominal pain, constipation, diarrhea and nausea  Genitourinary: Negative for dysuria  Musculoskeletal: Negative for arthralgias and back pain  Skin: Negative for rash  Neurological: Negative for headaches  Psychiatric/Behavioral: Negative for dysphoric mood  The patient is nervous/anxious  Objective:  /78   Pulse 84   Ht 4' 11 5\" (1 511 m)   Wt 78 1 kg (172 lb 3 2 oz)   LMP 04/11/2023 (Exact Date)   BMI 34 20 kg/m²     Physical Exam  Vitals and nursing note reviewed  Constitutional:       Appearance: Normal appearance  She is obese  HENT:      Head: Normocephalic  Pulmonary:      Effort: Pulmonary effort is normal    Neurological:      General: No focal deficit present  Mental Status: She is alert and oriented to person, place, and time   " Psychiatric:         Mood and Affect: Mood normal          Behavior: Behavior normal          Thought Content:  Thought content normal          Judgment: Judgment normal             Labs   Most recent labs reviewed   Lab Results   Component Value Date    SODIUM 134 (L) 01/10/2023    K 3 3 (L) 01/10/2023     01/10/2023    CO2 23 01/10/2023    AGAP 8 01/10/2023    BUN 10 01/10/2023    CREATININE 0 57 (L) 01/10/2023    GLUC 118 01/10/2023    CALCIUM 8 2 (L) 01/10/2023    AST 16 01/10/2023    ALT 11 01/10/2023    ALKPHOS 56 01/10/2023    TP 6 5 01/10/2023    TBILI 0 44 01/10/2023    EGFR 120 01/10/2023     Lab Results   Component Value Date    HGBA1C 5 4 01/30/2023     No results found for: [facility] Risk TSH  Lab Results   Component Value Date    CHOLESTEROL 184 01/30/2023     Lab Results   Component Value Date    HDL 84 01/30/2023     Lab Results   Component Value Date    TRIG 56 01/30/2023     Lab Results   Component Value Date    LDLCALC 86 01/30/2023

## 2023-04-26 NOTE — ASSESSMENT & PLAN NOTE
- Patient is pursuing Conservative Program including follow up visits with MWM for medication management  - Initial weight loss goal of 5-10% weight loss for improved health  - Labs reviewed from 1/2023  - Patient is tolerating phentermine 15mg with tolerable side effects  She will continue with this medication and dose as to not increase adverse effects  Discussed nutrition and will try to get more protein in the morning and try to balance dinner a little better  She will also work on increasing her water intake and activity level  Patient is not a candidate for topiramate due to history of kidney stones  GLP-1RA not covered medications      Goals:  Do not skip meals  Food log via yung -  www  myfitnesspal com  No sugary beverages  At least 64oz of water daily  Increase physical activity by 10 minutes daily   Gradually increase physical activity to a goal of 5 days per week for 30 minutes of MODERATE intensity PLUS 2 days per week of FULL BODY resistance training

## 2023-05-26 ENCOUNTER — TELEPHONE (OUTPATIENT)
Dept: BARIATRICS | Facility: CLINIC | Age: 38
End: 2023-05-26

## 2023-05-26 ENCOUNTER — OFFICE VISIT (OUTPATIENT)
Dept: BARIATRICS | Facility: CLINIC | Age: 38
End: 2023-05-26

## 2023-05-26 VITALS
BODY MASS INDEX: 33.34 KG/M2 | SYSTOLIC BLOOD PRESSURE: 108 MMHG | HEIGHT: 60 IN | WEIGHT: 169.8 LBS | HEART RATE: 82 BPM | DIASTOLIC BLOOD PRESSURE: 87 MMHG

## 2023-05-26 DIAGNOSIS — E66.9 OBESITY, CLASS I, BMI 30-34.9: ICD-10-CM

## 2023-05-26 DIAGNOSIS — E66.09 CLASS 1 OBESITY DUE TO EXCESS CALORIES WITH SERIOUS COMORBIDITY AND BODY MASS INDEX (BMI) OF 33.0 TO 33.9 IN ADULT: Primary | ICD-10-CM

## 2023-05-26 DIAGNOSIS — E66.9 OBESITY, CLASS II, BMI 35-39.9: ICD-10-CM

## 2023-05-26 PROBLEM — E66.811 CLASS 1 OBESITY DUE TO EXCESS CALORIES WITH SERIOUS COMORBIDITY AND BODY MASS INDEX (BMI) OF 33.0 TO 33.9 IN ADULT: Status: ACTIVE | Noted: 2023-05-26

## 2023-05-26 RX ORDER — PHENTERMINE HYDROCHLORIDE 15 MG/1
15 CAPSULE ORAL EVERY MORNING
Qty: 30 CAPSULE | Refills: 1 | Status: SHIPPED | OUTPATIENT
Start: 2023-05-26

## 2023-05-26 NOTE — TELEPHONE ENCOUNTER
Left patient voicemail to see if she is able to come in at 3:30 pm for her appointment today   Originally scheduled for 4 pm

## 2023-05-26 NOTE — ASSESSMENT & PLAN NOTE
- Patient is pursuing  and follow up visits with medical weight management provider  - Initial weight loss goal of 5-10% weight loss for improved health  -Patient continues to lose weight on phentermine  She has continued to reduce her carbohydrate intake, mainly more in the evening  She continues with regular exercise and has been consistent with her water intake  We will continue with phentermine 15 mg daily  Phentermine management:    Patient has been tolerating medication well - No blurry vision, depression or anxiety, no insomnia, no heart palpitations or chest pain  BP: controlled  Pulse: controlled  Contraception: reinforced education regarding potential harm to fetus if pregnancy occurs, advised to continue contraceptive methods  Reinforcement provided regarding use of clonazepam and phentermine together, patient knows to not use medications at the same time  Goals:  Do not skip meals  Food log via yung - options include  www  myfitnesspal com, sparkpeople  com, loseit com, calorieking  com, [place]  No sugary beverages  At least 64oz of water daily  Increase physical activity by 10 minutes daily   Gradually increase physical activity to a goal of 5 days per week for 30 minutes of MODERATE intensity PLUS 2 days per week of FULL BODY resistance training

## 2023-05-26 NOTE — PROGRESS NOTES
Assessment/Plan:     Class 1 obesity due to excess calories with serious comorbidity and body mass index (BMI) of 33 0 to 33 9 in adult  - Patient is pursuing  and follow up visits with medical weight management provider  - Initial weight loss goal of 5-10% weight loss for improved health  -Patient continues to lose weight on phentermine  She has continued to reduce her carbohydrate intake, mainly more in the evening  She continues with regular exercise and has been consistent with her water intake  We will continue with phentermine 15 mg daily  Phentermine management:    Patient has been tolerating medication well - No blurry vision, depression or anxiety, no insomnia, no heart palpitations or chest pain  BP: controlled  Pulse: controlled  Contraception: reinforced education regarding potential harm to fetus if pregnancy occurs, advised to continue contraceptive methods  Reinforcement provided regarding use of clonazepam and phentermine together, patient knows to not use medications at the same time  Goals:  Do not skip meals  Food log via yung - options include  www  myfitnesspal com, sparkpeople  com, loseit com, calorieking  com, [employer]  No sugary beverages  At least 64oz of water daily  Increase physical activity by 10 minutes daily  Gradually increase physical activity to a goal of 5 days per week for 30 minutes of MODERATE intensity PLUS 2 days per week of FULL BODY resistance training         Giangamal Philipni was seen today for follow-up  Diagnoses and all orders for this visit:    Class 1 obesity due to excess calories with serious comorbidity and body mass index (BMI) of 33 0 to 33 9 in adult    Obesity, Class II, BMI 35-39 9    Obesity, Class I, BMI 30-34 9        Total time spent reviewing chart, interviewing patient, examining patient, discussing plan, answering all questions, and documentin minutes with >50% face-to-face time with the patient      Follow up in approximately 4-6 weeks with Non-Surgical Physician/Advanced Practitioner  Subjective:   Chief Complaint   Patient presents with   • Follow-up     Pt is here for MWM f/u       Patient ID: Allan Flower  is a 41 y o  female with excess weight/obesity here to pursue weight management  Patient is pursuing Conservative Program    Most recent notes and records were reviewed  HPI    Wt Readings from Last 20 Encounters:   05/26/23 77 kg (169 lb 12 8 oz)   04/26/23 78 1 kg (172 lb 3 2 oz)   04/17/23 78 3 kg (172 lb 9 6 oz)   03/28/23 78 7 kg (173 lb 9 6 oz)   02/28/23 79 7 kg (175 lb 12 8 oz)   02/21/23 78 4 kg (172 lb 12 8 oz)   01/24/23 81 1 kg (178 lb 12 8 oz)   01/23/23 81 5 kg (179 lb 9 6 oz)   11/01/22 82 2 kg (181 lb 3 2 oz)   10/24/22 81 7 kg (180 lb 3 2 oz)   06/28/22 78 9 kg (174 lb)   06/06/22 79 kg (174 lb 3 2 oz)   05/26/22 78 kg (172 lb)   04/11/22 76 3 kg (168 lb 3 2 oz)   10/04/21 71 8 kg (158 lb 3 2 oz)   03/15/21 66 7 kg (147 lb)   11/02/20 62 6 kg (138 lb)   10/26/20 62 6 kg (138 lb)   10/21/19 55 8 kg (123 lb)   07/25/19 54 4 kg (120 lb)       Patient presents today to medical weight management office for follow up  Patient is tolerating phentermine 15mg  Experiencing some anxiety and insomnia at times but continues to be manageable  Patient has prescription for clonazepam but rarely uses  Had been on metformin in the past and it upset her stomach - may be willing to try again in the future  Patient has attempted a protein cereal in the morning but it caused her to have some abdominal cramping so continues to just use regular cereal   He has also been more conscious about her carbohydrates in the evening, avoiding the pastas  Patient monitors her heart rate at home and there have been no increases in her heart rate      Weight loss medication and dose: phentermine 15mg  Initial weight: 174 2 lbs (178 8 lbs at start of phentermine)  Current weight: 169 8 lbs (172 2 lbs last MWM OV)  Change in weight: -2 4 lbs (-9 lbs "since start of phentermine)  Goal: 120 lbs        B- bowl of cereal (cheerios or rice krispies) with blueberries and small banana and oatmilk, black tea  L- yogurt with unsweetened apple sauce, 2 cheese sticks  S- humus with baby carrots  D- huevos ranceros with 2 eggs, canned peas and carrots, sandwich with baby spinach, sometimes 1/2 cup of yogurt and flax seeds  S- popcorn    Hydration- black tea in the morning, water - 4 cups  Alcohol- rarely  Exercise- 30 minutes 5 days a week and 15 minute walk with dog        The following portions of the patient's history were reviewed and updated as appropriate: allergies, current medications, past family history, past medical history, past social history, past surgical history, and problem list     Family History   Problem Relation Age of Onset   • Depression Mother    • Colorectal Cancer Mother 72   • Basal cell carcinoma Father    • No Known Problems Brother    • Colon cancer Maternal Uncle    • Breast cancer Cousin    • Ovarian cancer Neg Hx         Review of Systems   Constitutional: Negative for fatigue  HENT: Negative for sore throat  Respiratory: Negative for cough and shortness of breath  Cardiovascular: Negative for chest pain, palpitations and leg swelling  Gastrointestinal: Negative for abdominal pain, constipation, diarrhea and nausea  Genitourinary: Negative for dysuria  Musculoskeletal: Negative for arthralgias and back pain  Skin: Negative for rash  Neurological: Negative for headaches  Psychiatric/Behavioral: Negative for dysphoric mood  The patient is not nervous/anxious  Objective:  /87   Pulse 82   Ht 4' 11 5\" (1 511 m)   Wt 77 kg (169 lb 12 8 oz)   LMP 05/01/2023 (Approximate)   BMI 33 72 kg/m²     Physical Exam  Vitals and nursing note reviewed  Constitutional:       Appearance: Normal appearance  She is obese  HENT:      Head: Normocephalic     Pulmonary:      Effort: Pulmonary effort is normal    Neurological: " "     General: No focal deficit present  Mental Status: She is alert and oriented to person, place, and time  Psychiatric:         Mood and Affect: Mood normal          Behavior: Behavior normal          Thought Content:  Thought content normal          Judgment: Judgment normal             Labs   Most recent labs reviewed   Lab Results   Component Value Date    AGAP 8 01/10/2023    ALKPHOS 56 01/10/2023    ALT 11 01/10/2023    AST 16 01/10/2023    BUN 10 01/10/2023    CALCIUM 8 2 (L) 01/10/2023     01/10/2023    CO2 23 01/10/2023    CREATININE 0 57 (L) 01/10/2023    EGFR 120 01/10/2023    GLUC 118 01/10/2023    K 3 3 (L) 01/10/2023    SODIUM 134 (L) 01/10/2023    TBILI 0 44 01/10/2023    TP 6 5 01/10/2023     Lab Results   Component Value Date    HGBA1C 5 4 01/30/2023     No results found for: \"TSH\", \"[id number]\"  Lab Results   Component Value Date    CHOLESTEROL 184 01/30/2023     Lab Results   Component Value Date    HDL 84 01/30/2023     Lab Results   Component Value Date    TRIG 56 01/30/2023     Lab Results   Component Value Date    LDLCALC 86 01/30/2023       "

## 2023-07-12 ENCOUNTER — OFFICE VISIT (OUTPATIENT)
Dept: BARIATRICS | Facility: CLINIC | Age: 38
End: 2023-07-12
Payer: COMMERCIAL

## 2023-07-12 VITALS
HEIGHT: 60 IN | WEIGHT: 170.8 LBS | HEART RATE: 91 BPM | BODY MASS INDEX: 33.53 KG/M2 | SYSTOLIC BLOOD PRESSURE: 90 MMHG | DIASTOLIC BLOOD PRESSURE: 60 MMHG

## 2023-07-12 DIAGNOSIS — E66.09 CLASS 1 OBESITY DUE TO EXCESS CALORIES WITH SERIOUS COMORBIDITY AND BODY MASS INDEX (BMI) OF 33.0 TO 33.9 IN ADULT: Primary | ICD-10-CM

## 2023-07-12 PROCEDURE — 99214 OFFICE O/P EST MOD 30 MIN: CPT | Performed by: NURSE PRACTITIONER

## 2023-07-12 RX ORDER — PHENTERMINE HYDROCHLORIDE 30 MG/1
30 CAPSULE ORAL EVERY MORNING
Qty: 30 CAPSULE | Refills: 0 | Status: SHIPPED | OUTPATIENT
Start: 2023-07-12 | End: 2023-08-11

## 2023-07-12 NOTE — PROGRESS NOTES
Assessment/Plan:     Class 1 obesity due to excess calories with serious comorbidity and body mass index (BMI) of 33.0 to 33.9 in adult  - Patient is pursuing  and follow up visits with medical weight management provider  - Initial weight loss goal of 5-10% weight loss for improved health  -Patient has hit a plateau with her weight loss. She has been getting back on track with her nutrition and continues with regular exercise. Discussed increasing phentermine to help with increasing cravings and hunger. Will increase phentermine to 30mg and patient will monitor anxiety, heart rate, and sleeping patterns on increased dose of medication. Phentermine management:    Patient has been tolerating medication well - No blurry vision, depression or anxiety, no insomnia, no heart palpitations or chest pain  BP: controlled  Pulse: controlled  Contraception: reinforced education regarding potential harm to fetus if pregnancy occurs, advised to continue contraceptive methods  Reinforcement provided regarding use of clonazepam and phentermine together, patient knows to not use medications at the same time. Goals:  Do not skip meals. Food log via yung - options include  www. onkea.com, sparkpeople. com, loseit.com, calorieking. com, bariDeNovaMed  No sugary beverages. At least 64oz of water daily. Increase physical activity by 10 minutes daily. Gradually increase physical activity to a goal of 5 days per week for 30 minutes of MODERATE intensity PLUS 2 days per week of FULL BODY resistance training         Ab Webb was seen today for follow-up. Diagnoses and all orders for this visit:    Class 1 obesity due to excess calories with serious comorbidity and body mass index (BMI) of 33.0 to 33.9 in adult  -     phentermine 30 MG capsule;  Take 1 capsule (30 mg total) by mouth every morning        Total time spent reviewing chart, interviewing patient, examining patient, discussing plan, answering all questions, and documentin minutes with >50% face-to-face time with the patient. Follow up in approximately 4-6 weeks with Non-Surgical Physician/Advanced Practitioner. Subjective:   Chief Complaint   Patient presents with   • Follow-up     Pt is here for MWM f/u       Patient ID: Oswaldo Cornejo  is a 41 y.o. female with excess weight/obesity here to pursue weight management. Patient is pursuing Conservative Program.   Most recent notes and records were reviewed. HPI    Wt Readings from Last 20 Encounters:   23 77.5 kg (170 lb 12.8 oz)   23 77 kg (169 lb 12.8 oz)   23 78.1 kg (172 lb 3.2 oz)   23 78.3 kg (172 lb 9.6 oz)   23 78.7 kg (173 lb 9.6 oz)   23 79.7 kg (175 lb 12.8 oz)   23 78.4 kg (172 lb 12.8 oz)   23 81.1 kg (178 lb 12.8 oz)   23 81.5 kg (179 lb 9.6 oz)   22 82.2 kg (181 lb 3.2 oz)   10/24/22 81.7 kg (180 lb 3.2 oz)   22 78.9 kg (174 lb)   22 79 kg (174 lb 3.2 oz)   22 78 kg (172 lb)   22 76.3 kg (168 lb 3.2 oz)   10/04/21 71.8 kg (158 lb 3.2 oz)   03/15/21 66.7 kg (147 lb)   20 62.6 kg (138 lb)   10/26/20 62.6 kg (138 lb)   10/21/19 55.8 kg (123 lb)       Patient presents today to medical weight management office for follow up. Patient is tolerating phentermine 15mg and states her side effects have subsided. She also states that her ability to control her cravings and hunger has also diminished in the past month, she does not feel as though she has as much control. She does state that she was on 2 medications in the past month where she was eating out more. Patient is interested in considering going up on her phentermine to see if it helps get her hunger and cravings back under control.       Weight loss medication and dose: phentermine 15mg  Initial weight: 174.2 lbs (178.8 lbs at start of phentermine)  Current weight: 170.8 lbs (169.8 lbs last MWM OV)  Change in weight: -8 lbs (+1 lbs since lat OV)  Goal: 120 lbs        B- bowl of cereal (cheerios or rice krispies) with blueberries and small banana and oatmilk, black tea  L- yogurt with unsweetened apple sauce, 2 cheese sticks  S- humus with baby carrots  D- eggs with veggies  S- popcorn    Hydration- black tea in the morning, water - 4 cups  Alcohol- rarely  Exercise- 30 minutes 5 days a week and 15 minute walk with dog      The following portions of the patient's history were reviewed and updated as appropriate: allergies, current medications, past family history, past medical history, past social history, past surgical history, and problem list.    Family History   Problem Relation Age of Onset   • Depression Mother    • Colorectal Cancer Mother 72   • Basal cell carcinoma Father    • No Known Problems Brother    • Colon cancer Maternal Uncle    • Breast cancer Cousin    • Ovarian cancer Neg Hx         Review of Systems   Constitutional: Negative for fatigue. HENT: Negative for sore throat. Respiratory: Negative for cough and shortness of breath. Cardiovascular: Negative for chest pain, palpitations and leg swelling. Gastrointestinal: Negative for abdominal pain, constipation, diarrhea and nausea. Genitourinary: Negative for dysuria. Musculoskeletal: Negative for arthralgias and back pain. Skin: Negative for rash. Neurological: Negative for headaches. Psychiatric/Behavioral: Negative for dysphoric mood. The patient is not nervous/anxious. Objective:  BP 90/60   Pulse 91   Ht 4' 11.5" (1.511 m)   Wt 77.5 kg (170 lb 12.8 oz)   LMP 07/01/2023 (Exact Date)   BMI 33.92 kg/m²     Physical Exam  Vitals and nursing note reviewed. Constitutional:       Appearance: Normal appearance. She is obese. HENT:      Head: Normocephalic. Pulmonary:      Effort: Pulmonary effort is normal.   Neurological:      General: No focal deficit present. Mental Status: She is alert and oriented to person, place, and time.    Psychiatric:         Mood and Affect: Mood normal.         Behavior: Behavior normal.         Thought Content:  Thought content normal.         Judgment: Judgment normal.            Labs   Most recent labs reviewed   Lab Results   Component Value Date    SODIUM 134 (L) 01/10/2023    K 3.3 (L) 01/10/2023     01/10/2023    CO2 23 01/10/2023    AGAP 8 01/10/2023    BUN 10 01/10/2023    CREATININE 0.57 (L) 01/10/2023    GLUC 118 01/10/2023    CALCIUM 8.2 (L) 01/10/2023    AST 16 01/10/2023    ALT 11 01/10/2023    ALKPHOS 56 01/10/2023    TP 6.5 01/10/2023    TBILI 0.44 01/10/2023    EGFR 120 01/10/2023     Lab Results   Component Value Date    HGBA1C 5.4 01/30/2023     No results found for: "[id number]", "TSH"  Lab Results   Component Value Date    CHOLESTEROL 184 01/30/2023     Lab Results   Component Value Date    HDL 84 01/30/2023     Lab Results   Component Value Date    TRIG 56 01/30/2023     Lab Results   Component Value Date    LDLCALC 86 01/30/2023

## 2023-07-12 NOTE — ASSESSMENT & PLAN NOTE
- Patient is pursuing  and follow up visits with medical weight management provider  - Initial weight loss goal of 5-10% weight loss for improved health  -Patient has hit a plateau with her weight loss. She has been getting back on track with her nutrition and continues with regular exercise. Discussed increasing phentermine to help with increasing cravings and hunger. Will increase phentermine to 30mg and patient will monitor anxiety, heart rate, and sleeping patterns on increased dose of medication. Phentermine management:    Patient has been tolerating medication well - No blurry vision, depression or anxiety, no insomnia, no heart palpitations or chest pain  BP: controlled  Pulse: controlled  Contraception: reinforced education regarding potential harm to fetus if pregnancy occurs, advised to continue contraceptive methods  Reinforcement provided regarding use of clonazepam and phentermine together, patient knows to not use medications at the same time. Goals:  Do not skip meals. Food log via yung - options include  www. [employer]nesspal.com, sparkpeople. com, loseit.com, calorieking. com, baritastic  No sugary beverages. At least 64oz of water daily. Increase physical activity by 10 minutes daily.  Gradually increase physical activity to a goal of 5 days per week for 30 minutes of MODERATE intensity PLUS 2 days per week of FULL BODY resistance training

## 2023-08-14 ENCOUNTER — OFFICE VISIT (OUTPATIENT)
Dept: BARIATRICS | Facility: CLINIC | Age: 38
End: 2023-08-14
Payer: COMMERCIAL

## 2023-08-14 VITALS
BODY MASS INDEX: 32.83 KG/M2 | SYSTOLIC BLOOD PRESSURE: 108 MMHG | HEIGHT: 60 IN | DIASTOLIC BLOOD PRESSURE: 80 MMHG | HEART RATE: 117 BPM | WEIGHT: 167.2 LBS

## 2023-08-14 DIAGNOSIS — E66.09 CLASS 1 OBESITY DUE TO EXCESS CALORIES WITH SERIOUS COMORBIDITY AND BODY MASS INDEX (BMI) OF 33.0 TO 33.9 IN ADULT: Primary | ICD-10-CM

## 2023-08-14 PROCEDURE — 99214 OFFICE O/P EST MOD 30 MIN: CPT | Performed by: NURSE PRACTITIONER

## 2023-08-14 RX ORDER — PHENTERMINE HYDROCHLORIDE 15 MG/1
15 CAPSULE ORAL EVERY MORNING
Qty: 30 CAPSULE | Refills: 0 | Status: SHIPPED | OUTPATIENT
Start: 2023-08-14

## 2023-08-14 NOTE — PROGRESS NOTES
Non-Surgical Physician/Advanced Practitioner. Subjective:   Chief Complaint   Patient presents with   • Follow-up     Pt is here for MWM f/u. Patient ID: Jailyn Reyes  is a 41 y.o. female with excess weight/obesity here to pursue weight management. Patient is pursuing Conservative Program.   Most recent notes and records were reviewed. HPI    Wt Readings from Last 20 Encounters:   08/14/23 75.8 kg (167 lb 3.2 oz)   07/12/23 77.5 kg (170 lb 12.8 oz)   05/26/23 77 kg (169 lb 12.8 oz)   04/26/23 78.1 kg (172 lb 3.2 oz)   04/17/23 78.3 kg (172 lb 9.6 oz)   03/28/23 78.7 kg (173 lb 9.6 oz)   02/28/23 79.7 kg (175 lb 12.8 oz)   02/21/23 78.4 kg (172 lb 12.8 oz)   01/24/23 81.1 kg (178 lb 12.8 oz)   01/23/23 81.5 kg (179 lb 9.6 oz)   11/01/22 82.2 kg (181 lb 3.2 oz)   10/24/22 81.7 kg (180 lb 3.2 oz)   06/28/22 78.9 kg (174 lb)   06/06/22 79 kg (174 lb 3.2 oz)   05/26/22 78 kg (172 lb)   04/11/22 76.3 kg (168 lb 3.2 oz)   10/04/21 71.8 kg (158 lb 3.2 oz)   03/15/21 66.7 kg (147 lb)   11/02/20 62.6 kg (138 lb)   10/26/20 62.6 kg (138 lb)       Patient presents today to medical weight management office for follow up. Patient has been on phentermine 30 mg for about 5 to 6 weeks and has noticed an increase in her heart rate since starting the higher dose. She has noticed increase in her ability to control her cravings and her appetite. She feels that she has been able to stay more on track but is concerned about her heart rate being elevated. She continues with her current meal plan.        Weight loss medication and dose: phentermine 30mg  Initial weight: 174.2 lbs (178.8 lbs at start of phentermine)  Current weight: 167.2 lbs (170.8 lbs last MWM OV)  Change in weight: -7 lbs (-3.6 lbs since lat OV)  Goal: 120 lbs        B- bowl of cereal (cheerios or rice krispies) with blueberries and small banana and oatmilk, black tea  L- yogurt with unsweetened apple sauce, 2 cheese sticks  S- humus with baby carrots  D- eggs with veggies OR ramirez with chick peas and rice  S- popcorn    Hydration- black tea in the morning, water - 4 cups  Alcohol- rarely  Exercise- 30 minutes 5 days a week and 15 minute walk with dog      The following portions of the patient's history were reviewed and updated as appropriate: allergies, current medications, past family history, past medical history, past social history, past surgical history, and problem list.    Family History   Problem Relation Age of Onset   • Depression Mother    • Colorectal Cancer Mother 72   • Basal cell carcinoma Father    • No Known Problems Brother    • Colon cancer Maternal Uncle    • Breast cancer Cousin    • Ovarian cancer Neg Hx         Review of Systems   Constitutional: Negative for fatigue. HENT: Negative for sore throat. Respiratory: Negative for cough and shortness of breath. Cardiovascular: Negative for chest pain, palpitations and leg swelling. Gastrointestinal: Negative for abdominal pain, constipation, diarrhea and nausea. Genitourinary: Negative for dysuria. Musculoskeletal: Negative for arthralgias and back pain. Skin: Negative for rash. Neurological: Negative for headaches. Psychiatric/Behavioral: Negative for dysphoric mood. The patient is not nervous/anxious. Objective:  /80   Pulse (!) 117   Ht 4' 11.5" (1.511 m)   Wt 75.8 kg (167 lb 3.2 oz)   LMP 07/24/2023 (Approximate)   BMI 33.21 kg/m²     Physical Exam  Vitals and nursing note reviewed. Constitutional:       Appearance: Normal appearance. She is obese. HENT:      Head: Normocephalic. Pulmonary:      Effort: Pulmonary effort is normal.   Neurological:      General: No focal deficit present. Mental Status: She is alert and oriented to person, place, and time. Psychiatric:         Mood and Affect: Mood normal.         Behavior: Behavior normal.         Thought Content:  Thought content normal.         Judgment: Judgment normal. Labs   Most recent labs reviewed   Lab Results   Component Value Date    SODIUM 134 (L) 01/10/2023    K 3.3 (L) 01/10/2023     01/10/2023    CO2 23 01/10/2023    AGAP 8 01/10/2023    BUN 10 01/10/2023    CREATININE 0.57 (L) 01/10/2023    GLUC 118 01/10/2023    CALCIUM 8.2 (L) 01/10/2023    AST 16 01/10/2023    ALT 11 01/10/2023    ALKPHOS 56 01/10/2023    TP 6.5 01/10/2023    TBILI 0.44 01/10/2023    EGFR 120 01/10/2023     Lab Results   Component Value Date    HGBA1C 5.4 01/30/2023     No results found for: "[id number]", "TSH"  Lab Results   Component Value Date    CHOLESTEROL 184 01/30/2023     Lab Results   Component Value Date    HDL 84 01/30/2023     Lab Results   Component Value Date    TRIG 56 01/30/2023     Lab Results   Component Value Date    LDLCALC 86 01/30/2023

## 2023-08-15 NOTE — ASSESSMENT & PLAN NOTE
- Patient is pursuing Conservative Program and follow up visits with medical weight management provider  - Initial weight loss goal of 5-10% weight loss for improved health  -Patient lifestyle habits were reviewed. Patient was encouraged to start incorporating more protein with her breakfast meal such as eggs, yogurt, or peanut butter. She was also encouraged to start a more regular exercise routine, including cardio workouts and resistance training. She was also encouraged to increase her water intake to at least 6-8 of water daily. Occasions were reviewed and patient will be placed back on phentermine 15 mg she tolerated this well. We will return in 3 to 4 weeks to meet with nurse to check her heart rate and blood pressure. Patient was informed to call with any adverse effects including racing heartbeat, shortness of breath, or chest pains. Goals:  Do not skip meals. Food log via yung - options include  www. [employer] B[employer] Food Companynesspal.com, sparkpeople. com, loseit.com, calorieking. com, baritastGroupMe  No sugary beverages. At least 64oz of water daily. Increase physical activity by 10 minutes daily.  Gradually increase physical activity to a goal of 5 days per week for 30 minutes of MODERATE intensity PLUS 2 days per week of FULL BODY resistance training

## 2023-11-13 ENCOUNTER — OFFICE VISIT (OUTPATIENT)
Dept: FAMILY MEDICINE CLINIC | Facility: CLINIC | Age: 38
End: 2023-11-13
Payer: COMMERCIAL

## 2023-11-13 VITALS
HEART RATE: 92 BPM | SYSTOLIC BLOOD PRESSURE: 112 MMHG | OXYGEN SATURATION: 97 % | BODY MASS INDEX: 33.38 KG/M2 | WEIGHT: 170 LBS | RESPIRATION RATE: 16 BRPM | HEIGHT: 60 IN | TEMPERATURE: 98 F | DIASTOLIC BLOOD PRESSURE: 68 MMHG

## 2023-11-13 DIAGNOSIS — K21.9 CHRONIC GERD: ICD-10-CM

## 2023-11-13 DIAGNOSIS — H66.001 NON-RECURRENT ACUTE SUPPURATIVE OTITIS MEDIA OF RIGHT EAR WITHOUT SPONTANEOUS RUPTURE OF TYMPANIC MEMBRANE: Primary | ICD-10-CM

## 2023-11-13 DIAGNOSIS — R09.81 NASAL CONGESTION: ICD-10-CM

## 2023-11-13 PROCEDURE — 99213 OFFICE O/P EST LOW 20 MIN: CPT | Performed by: FAMILY MEDICINE

## 2023-11-13 RX ORDER — FLUTICASONE PROPIONATE 50 MCG
2 SPRAY, SUSPENSION (ML) NASAL DAILY
Qty: 16 G | Refills: 0 | Status: SHIPPED | OUTPATIENT
Start: 2023-11-13

## 2023-11-13 RX ORDER — CEFUROXIME AXETIL 500 MG/1
500 TABLET ORAL EVERY 12 HOURS SCHEDULED
Qty: 14 TABLET | Refills: 0 | Status: SHIPPED | OUTPATIENT
Start: 2023-11-13 | End: 2023-11-20

## 2023-11-13 NOTE — PROGRESS NOTES
Name: Kymberly Sosa      : 1985      MRN: [id number]  Encounter Provider: Mabel Bach MD  Encounter Date: 2023   Encounter department: [address] [place] [address]     1. Non-recurrent acute suppurative otitis media of right ear without spontaneous rupture of tympanic membrane  Assessment & Plan:  She finished the doxycycline and prednisone still has erythema in the right tympanic membrane and she feels pressure and pain on the side, will start her on Ceftin, she says she gets GI symptoms with Amoxil , but no major reaction    Orders:  -     cefuroxime (CEFTIN) 500 mg tablet; Take 1 tablet (500 mg total) by mouth every 12 (twelve) hours for 7 days  -     fluticasone (FLONASE) 50 mcg/act nasal spray; 2 sprays into each nostril daily    2. Nasal congestion  -     cefuroxime (CEFTIN) 500 mg tablet; Take 1 tablet (500 mg total) by mouth every 12 (twelve) hours for 7 days  -     fluticasone (FLONASE) 50 mcg/act nasal spray; 2 sprays into each nostril daily    3. Chronic GERD  Assessment & Plan:  Stable and continue PPI      Depression Screening Follow-up Plan: Patient's depression screening was positive with a PHQ-2 score of . Their PHQ-9 score was . Continue regular follow-up with their psychologist/therapist/psychiatrist who is managing their mental health condition(s). Subjective     She was seen in the urgent care about 2 weeks ago and she was given the doxycycline and prednisone for sinus infection and bronchitis, she says she still has little bit cough but more problem is very right ear pressure and discomfort, no nausea vomiting diarrhea or fever  Follows psychiatrist and depression under control on Wellbutrin      Review of Systems   Constitutional: Negative. HENT:  Positive for congestion and sinus pressure. Negative for ear pain, facial swelling, sneezing and sore throat. Eyes: Negative. Respiratory:  Positive for cough. Cardiovascular: Negative. Gastrointestinal: Negative. Past Medical History:   Diagnosis Date   • Abnormal weight gain 03/23/2006   • Acne 03/23/2006   • Depression    • Dysfunction of both eustachian tubes     last assessed 12/21/17   • History of PCOS    • Hyperlipidemia    • Polycystic ovarian syndrome 01/30/2009   • Severe single current episode of major depressive disorder, without psychotic features ([address])     last assessed 06/21/16   • Suicidal ideation     last assessed 10/24/17     Past Surgical History:   Procedure Laterality Date   • [place] ESOPHAGOGASTRODUODENOSCOPY TRANSORAL DIAGNOSTIC N/A 10/24/2018    Procedure: ESOPHAGOGASTRODUODENOSCOPY (EGD); Surgeon: Bari Alvarado MD;  Location: [facility];   Service: Gastroenterology   • WISDOM TOOTH EXTRACTION       Family History   Problem Relation Age of Onset   • Depression Mother    • Colorectal Cancer Mother 72   • Basal cell carcinoma Father    • No Known Problems Brother    • Colon cancer Maternal Uncle    • Breast cancer Cousin    • Ovarian cancer Neg Hx      Social History     Socioeconomic History   • Marital status: Single     Spouse name: None   • Number of children: None   • Years of education: None   • Highest education level: None   Occupational History   • None   Tobacco Use   • Smoking status: Never   • Smokeless tobacco: Never   Vaping Use   • Vaping Use: Never used   Substance and Sexual Activity   • Alcohol use: Yes     Comment: rare alcohol use   • Drug use: No   • Sexual activity: Yes     Partners: Male     Birth control/protection: OCP, Condom Male   Other Topics Concern   • None   Social History Narrative    Caffeine use    Occupation     Social Determinants of Health     Financial Resource Strain: Not on file   Food Insecurity: Not on file   Transportation Needs: Not on file   Physical Activity: Not on file   Stress: Not on file   Social Connections: Not on file   Intimate Partner Violence: Not on file   Housing Stability: Not on file     Current Outpatient Medications on File Prior to Visit   Medication Sig   • buPROPion (WELLBUTRIN XL) 300 mg 24 hr tablet Take 300 mg by mouth daily   • citalopram (CeleXA) 20 mg tablet Take 20 mg by mouth daily at bedtime   • diphenhydrAMINE (BENADRYL) 25 mg tablet Take 25 mg by mouth every 6 (six) hours as needed for itching 2 tabs daily as needed   • levonorgestrel-ethinyl estradiol (Vienva) 0.1-20 MG-MCG per tablet Take 1 tablet by mouth daily   • omeprazole (PriLOSEC) 20 mg delayed release capsule Take 20 mg by mouth daily   • phentermine 15 MG capsule Take 1 capsule (15 mg total) by mouth every morning   • clindamycin (CLEOCIN T) 1 % external solution Apply topically 2 (two) times a day (Patient not taking: Reported on 5/26/2023)   • clonazePAM (KlonoPIN) 0.5 mg tablet  (Patient not taking: Reported on 8/14/2023)     Allergies   Allergen Reactions   • Amoxicillin GI Intolerance     Immunization History   Administered Date(s) Administered   • COVID-19 PFIZER VACCINE 0.3 ML IM 03/01/2021, 03/22/2021   • DTaP 5 1985, [birth year], 11/05/1988, 07/24/1992   • H1N1, All Formulations 12/16/2009   • Hep B, adult 08/07/2003, 08/25/2003, 04/01/2004   • Hib (HbOC) 10/22/1988   • INFLUENZA 11/06/2019, 12/03/2020   • IPV 1985, 1985, 01/09/1988, 11/05/1988   • Influenza Quadrivalent Preservative Free 3 years and older IM 12/03/2020   • MMR 07/30/1988, 07/24/1992   • Td (adult), adsorbed 08/06/2003   • Tdap 02/21/2023       Objective     /68   Pulse 92   Temp 98 °F (36.7 °C)   Resp 16   Ht 4' 11.5" (1.511 m)   Wt 77.1 kg (170 lb)   SpO2 97%   BMI 33.76 kg/m²     Physical Exam  Vitals and nursing note reviewed. Constitutional:       Appearance: Normal appearance. She is well-developed. She is not ill-appearing. HENT:      Left Ear: Tympanic membrane normal.      Ears:      Comments: Right TM is erythematous     Nose: Congestion present.       Mouth/Throat:      Mouth: Mucous membranes are moist. Eyes:      Extraocular Movements: Extraocular movements intact. Neck:      Thyroid: No thyromegaly. Cardiovascular:      Rate and Rhythm: Normal rate and regular rhythm. Heart sounds: Normal heart sounds. No murmur heard. Pulmonary:      Effort: Pulmonary effort is normal.      Breath sounds: Normal breath sounds. No wheezing or rales. Musculoskeletal:      Cervical back: Normal range of motion and neck supple. Lymphadenopathy:      Cervical: No cervical adenopathy. Skin:     Findings: No erythema or rash. Neurological:      Mental Status: She is alert.        Cordelia Maharaj MD

## 2023-11-13 NOTE — ASSESSMENT & PLAN NOTE
She finished the doxycycline and prednisone still has erythema in the right tympanic membrane and she feels pressure and pain on the side, will start her on Ceftin, she says she gets GI symptoms with Amoxil , but no major reaction

## 2023-12-12 ENCOUNTER — OFFICE VISIT (OUTPATIENT)
Dept: BARIATRICS | Facility: CLINIC | Age: 38
End: 2023-12-12
Payer: COMMERCIAL

## 2023-12-12 VITALS
HEART RATE: 93 BPM | WEIGHT: 175.2 LBS | DIASTOLIC BLOOD PRESSURE: 80 MMHG | SYSTOLIC BLOOD PRESSURE: 108 MMHG | BODY MASS INDEX: 34.4 KG/M2 | HEIGHT: 60 IN

## 2023-12-12 DIAGNOSIS — E28.2 PCOS (POLYCYSTIC OVARIAN SYNDROME): ICD-10-CM

## 2023-12-12 DIAGNOSIS — E66.09 CLASS 1 OBESITY DUE TO EXCESS CALORIES WITH SERIOUS COMORBIDITY AND BODY MASS INDEX (BMI) OF 33.0 TO 33.9 IN ADULT: Primary | ICD-10-CM

## 2023-12-12 PROCEDURE — 99214 OFFICE O/P EST MOD 30 MIN: CPT | Performed by: NURSE PRACTITIONER

## 2023-12-12 RX ORDER — PHENTERMINE HYDROCHLORIDE 15 MG/1
15 CAPSULE ORAL EVERY MORNING
Qty: 30 CAPSULE | Refills: 0 | Status: SHIPPED | OUTPATIENT
Start: 2023-12-12

## 2023-12-12 NOTE — ASSESSMENT & PLAN NOTE
Patient did not tolerate metformin in the past due to GI upset  Patient may benefit from GLP-1 medication, will inquire with insurance company if [facility] [place] for PCOS

## 2023-12-12 NOTE — ASSESSMENT & PLAN NOTE
- Patient is pursuing Conservative Program and follow up visits with medical weight management provider  - Initial weight loss goal of 5-10% weight loss for improved health  - Labs reviewed from 1/2023 - will be getting updated lab work with PCP  -Patient lifestyle habits were reviewed and patient will reconnect with a dietitian to get a better handle on her nutrition. Patient will work on increasing her protein intake gradually to allow her body to better adjust.  She will also work on high-protein snack and food options that are easy to meal prep. Medication options were discussed and patient would like to go back on phentermine as she felt this helped control her hunger. Educated patient about consistency of medication and to reach out when she is running low on medication to continue with therapy. Naltrexone was also discussed as an option to add to her bupropion to help with cravings and emotional eating. Patient may consider in the future if phentermine does not affect. Rommie Ferns was also discussed as an option to help with her PCOS and she will inquire if her insurance company covers it for PCOS. Goals:  Calorie Goal: 9878-5270 calories per day  Do not skip meals. Food log via yung or provided paper log (yung options include www. [employer]ng[employer].com, sparkpeople. com, loseit.com, calorieking. com, bariSpreadsave)  No sugary beverages. At least 64oz of water daily. Increase physical activity by 10 minutes daily.  Gradually increase physical activity to a goal of 5 days per week for 30 minutes of MODERATE intensity PLUS 2 days per week of FULL BODY resistance training

## 2023-12-12 NOTE — PROGRESS NOTES
Assessment/Plan:     PCOS (polycystic ovarian syndrome)  Patient did not tolerate metformin in the past due to GI upset  Patient may benefit from GLP-1 medication, will inquire with insurance company if Cappuccini is covered for PCOS    Class 1 obesity due to excess calories with serious comorbidity and body mass index (BMI) of 33.0 to 33.9 in adult  - Patient is pursuing Conservative Program and follow up visits with medical weight management provider  - Initial weight loss goal of 5-10% weight loss for improved health  - Labs reviewed from 1/2023 - will be getting updated lab work with PCP  -Patient lifestyle habits were reviewed and patient will reconnect with a dietitian to get a better handle on her nutrition. Patient will work on increasing her protein intake gradually to allow her body to better adjust.  She will also work on high-protein snack and food options that are easy to meal prep. Medication options were discussed and patient would like to go back on phentermine as she felt this helped control her hunger. Educated patient about consistency of medication and to reach out when she is running low on medication to continue with therapy. Naltrexone was also discussed as an option to add to her bupropion to help with cravings and emotional eating. Patient may consider in the future if phentermine does not affect. Cappuccini was also discussed as an option to help with her PCOS and she will inquire if her insurance company covers it for PCOS. Goals:  Calorie Goal: 7648-9237 calories per day  Do not skip meals. Food log via yung or provided paper log (yung options include www. [employer].com, sparkpeople. com, loseit.com, calorieking. com, [employer])  No sugary beverages. At least 64oz of water daily. Increase physical activity by 10 minutes daily.  Gradually increase physical activity to a goal of 5 days per week for 30 minutes of MODERATE intensity PLUS 2 days per week of FULL BODY resistance training Jah Catalan was seen today for follow-up. Diagnoses and all orders for this visit:    PCOS (polycystic ovarian syndrome)    Class 1 obesity due to excess calories with serious comorbidity and body mass index (BMI) of 33.0 to 33.9 in adult  -     phentermine 15 MG capsule; Take 1 capsule (15 mg total) by mouth every morning        Total time spent reviewing chart, interviewing patient, examining patient, discussing plan, answering all questions, and documentin minutes with >50% face-to-face time with the patient. Follow up in approximately 2 months with Non-Surgical Physician/Advanced Practitioner. Subjective:   Chief Complaint   Patient presents with    Follow-up     Pt is here for MWM f/u       Patient ID: Savannah Abdi  is a 41 y.o. female with excess weight/obesity here to pursue weight management. Patient is pursuing Conservative Program.   Most recent notes and records were reviewed. HPI    Wt Readings from Last 20 Encounters:   23 79.5 kg (175 lb 3.2 oz)   23 77.1 kg (170 lb)   23 75.8 kg (167 lb 3.2 oz)   23 77.5 kg (170 lb 12.8 oz)   23 77 kg (169 lb 12.8 oz)   23 78.1 kg (172 lb 3.2 oz)   23 78.3 kg (172 lb 9.6 oz)   23 78.7 kg (173 lb 9.6 oz)   23 79.7 kg (175 lb 12.8 oz)   23 78.4 kg (172 lb 12.8 oz)   23 81.1 kg (178 lb 12.8 oz)   23 81.5 kg (179 lb 9.6 oz)   22 82.2 kg (181 lb 3.2 oz)   10/24/22 81.7 kg (180 lb 3.2 oz)   22 78.9 kg (174 lb)   22 79 kg (174 lb 3.2 oz)   22 78 kg (172 lb)   22 76.3 kg (168 lb 3.2 oz)   10/04/21 71.8 kg (158 lb 3.2 oz)   03/15/21 66.7 kg (147 lb)       Patient presents today to medical weight management office for follow up. Patient has gained weight since last visit as she states she "fell off the wagon". She had attempted to increase her protein content as we discussed at last visit but she found it caused abdominal bloating.   She now is struggling with food choices and portion control throughout the day and would like to meet with a dietitian to better balance her nutrition again. Patient does report that her hunger and her energy level were both improved when she increase her protein intake. Patient has been off the phentermine since October as she just ran out of the medication. She felt well on the lower dose of the phentermine and would like to consider restarting. Weight loss medication and dose: none since Oct  Initial weight: 174.2 lbs in 6/2022  Current weight: 175.2 lbs (167.2 lbs last MWM OV)  Change in weight: +1 lbs (+8 lbs since lat OV)  Goal: 120 lbs    Starting BMI: 34.6 in 6/2022  Current BMI: 34.79    Waist Measurements:  8/2023: 35 in  12/2023: 37.5 in      Nutrition Prescription  Calories: 1,000-1,200 kcal  Protein: 60-85 g  Fluid: 52+ ounces      B- oatmeal OR skips OR fruit (banana)  L- yogurt with unsweetened apple sauce, 2 cheese sticks OR PB&J on whole grain  S- humus with baby carrots  D- eggs with veggies OR ramirez with chick peas and rice  S- popcorn    Hydration- black tea in the morning, water - 4 cups  Alcohol- rarely  Exercise- 30 minutes 5 days a week and 15 minute walk with dog      The following portions of the patient's history were reviewed and updated as appropriate: allergies, current medications, past family history, past medical history, past social history, past surgical history, and problem list.    Family History   Problem Relation Age of Onset    Depression Mother     Colorectal Cancer Mother 72    Basal cell carcinoma Father     No Known Problems Brother     Colon cancer Maternal Uncle     Breast cancer Cousin     Ovarian cancer Neg Hx         Review of Systems   Constitutional:  Negative for fatigue. HENT:  Negative for sore throat. Respiratory:  Negative for cough and shortness of breath. Cardiovascular:  Negative for chest pain, palpitations and leg swelling.    Gastrointestinal: Negative for abdominal pain, constipation, diarrhea and nausea. Genitourinary:  Negative for dysuria. Musculoskeletal:  Negative for arthralgias and back pain. Skin:  Negative for rash. Neurological:  Negative for headaches. Psychiatric/Behavioral:  Negative for dysphoric mood. The patient is not nervous/anxious. Objective:  /80   Pulse 93   Ht 4' 11.5" (1.511 m)   Wt 79.5 kg (175 lb 3.2 oz)   LMP 10/17/2023 (Approximate)   BMI 34.79 kg/m²     Physical Exam  Vitals and nursing note reviewed. Constitutional:       Appearance: Normal appearance. She is obese. HENT:      Head: Normocephalic. Pulmonary:      Effort: Pulmonary effort is normal.   Neurological:      General: No focal deficit present. Mental Status: She is alert and oriented to person, place, and time. Psychiatric:         Mood and Affect: Mood normal.         Behavior: Behavior normal.         Thought Content:  Thought content normal.         Judgment: Judgment normal.            Labs   Most recent labs reviewed   Lab Results   Component Value Date    SODIUM 134 (L) 01/10/2023    K 3.3 (L) 01/10/2023     01/10/2023    CO2 23 01/10/2023    AGAP 8 01/10/2023    BUN 10 01/10/2023    CREATININE 0.57 (L) 01/10/2023    GLUC 118 01/10/2023    CALCIUM 8.2 (L) 01/10/2023    AST 16 01/10/2023    ALT 11 01/10/2023    ALKPHOS 56 01/10/2023    TP 6.5 01/10/2023    TBILI 0.44 01/10/2023    EGFR 120 01/10/2023     Lab Results   Component Value Date    HGBA1C 5.4 01/30/2023     No results found for: "RZY3HHMERPZC", "TSH"  Lab Results   Component Value Date    CHOLESTEROL 184 01/30/2023     Lab Results   Component Value Date    HDL 84 01/30/2023     Lab Results   Component Value Date    TRIG 56 01/30/2023     Lab Results   Component Value Date    LDLCALC 86 01/30/2023

## 2024-01-12 PROBLEM — H66.001 NON-RECURRENT ACUTE SUPPURATIVE OTITIS MEDIA OF RIGHT EAR WITHOUT SPONTANEOUS RUPTURE OF TYMPANIC MEMBRANE: Status: RESOLVED | Noted: 2023-11-13 | Resolved: 2024-01-12

## 2024-01-31 DIAGNOSIS — E66.09 CLASS 1 OBESITY DUE TO EXCESS CALORIES WITH SERIOUS COMORBIDITY AND BODY MASS INDEX (BMI) OF 33.0 TO 33.9 IN ADULT: ICD-10-CM

## 2024-01-31 RX ORDER — PHENTERMINE HYDROCHLORIDE 15 MG/1
15 CAPSULE ORAL EVERY MORNING
Qty: 30 CAPSULE | Refills: 0 | Status: SHIPPED | OUTPATIENT
Start: 2024-01-31

## 2024-01-31 NOTE — TELEPHONE ENCOUNTER
Reason for call:   [x] Refill   [] Prior Auth  [] Other:     Office:   [x] PCP/Provider - Kari Haley   [] Specialty/Provider -     Medication:   phentermine    Dose/Frequency:   15 mg cap every morning    Quantity:   30    [facility]    Does the patient have enough for 3 days?   [x] Yes   [] No - Send as HP to POD

## 2024-02-09 ENCOUNTER — TELEPHONE (OUTPATIENT)
Age: 39
End: 2024-02-09

## 2024-02-09 NOTE — TELEPHONE ENCOUNTER
Patient called in requesting her yearly labs be ordered. She would like to have A1C and Lipid panel ordered. Patient stated she will go to a [facility] lab.    Please advise, thank you

## 2024-02-12 ENCOUNTER — OFFICE VISIT (OUTPATIENT)
Dept: BARIATRICS | Facility: CLINIC | Age: 39
End: 2024-02-12
Payer: COMMERCIAL

## 2024-02-12 VITALS
HEIGHT: 60 IN | SYSTOLIC BLOOD PRESSURE: 108 MMHG | WEIGHT: 175.6 LBS | BODY MASS INDEX: 34.47 KG/M2 | HEART RATE: 99 BPM | DIASTOLIC BLOOD PRESSURE: 66 MMHG

## 2024-02-12 DIAGNOSIS — N20.0 RENAL CALCULUS OR STONE: ICD-10-CM

## 2024-02-12 DIAGNOSIS — Z13.6 SCREENING FOR CARDIOVASCULAR CONDITION: Primary | ICD-10-CM

## 2024-02-12 DIAGNOSIS — E28.2 PCOS (POLYCYSTIC OVARIAN SYNDROME): Primary | ICD-10-CM

## 2024-02-12 DIAGNOSIS — E66.09 CLASS 1 OBESITY DUE TO EXCESS CALORIES WITH SERIOUS COMORBIDITY AND BODY MASS INDEX (BMI) OF 33.0 TO 33.9 IN ADULT: ICD-10-CM

## 2024-02-12 PROCEDURE — 99214 OFFICE O/P EST MOD 30 MIN: CPT | Performed by: NURSE PRACTITIONER

## 2024-02-12 NOTE — ASSESSMENT & PLAN NOTE
- Patient is pursuing Conservative Program and follow up visits with medical weight management provider  - Initial weight loss goal of 5-10% weight loss for improved health  - Weight is not at goal and patient tried for more than 6 months to lose weight using various programs and tools, they were unable to achieve a meaningful weight loss above 5%  - Labs reviewed from 1/2023 - will be getting updated lab work with PCP  -Patient lifestyle habits were reviewed. Nutrition was discussed and patient will continue to work on protein intake to help with hunger - but will adjust these levels slowly still to avoid GI discomfort.    Medications were discussed and patient is not losing weight on phentermine. Her pulse is also averaging around 100 bpm when resting so will suggest discontinuation of this medication.  Mounjaro was discussed and will be prescribed to help treat her PCOS symptoms. Pen was demonstrated in the office and 2nd form of birth control recommendations were reviewed with patient. Patient voices understanding.  Mounjaro Instructions:    - Begin Mounjaro 2.5 mg subcutaneously once a week. Dose changes may occur after 4 doses if medication is tolerated. You will be assessed prior to each dose change to make sure you are tolerating the medication well.  - Please message me when you have 2 pens left from the prescription so there are no lapses in treatment.  - Visit [email or web address] for further information/injection instructions.   -Please eat small frequent meals to help reduce nausea. Lemon water and saltine crackers may help with this.   - Side effects of Mounjaro discussed: nausea, vomiting, diarrhea, and constipation. If you experience fever, nausea/vomiting, and pain radiating to your back this may be a sign of pancreatitis. Please go to the emergency room if this occurs.  - If on oral birth control a 2nd method of birth control is recommended during the 1st 8 weeks of therapy and for 4 weeks after any dosage  change.   - Patient understands the side effects of the medication and proper administration. Patient agrees with the treatment plan and all questions were answered.     Goals:  Calorie Goal: 9782-2824 calories per day  Do not skip meals.  Food log via yung or provided paper log (yung options include www.[employer].com, sparkpeople.com, loseit.com, calorieking.com, [employer])  No sugary beverages.   At least 64oz of water daily.  Increase physical activity by 10 minutes daily. Gradually increase physical activity to a goal of 5 days per week for 30 minutes of MODERATE intensity PLUS 2 days per week of FULL BODY resistance training

## 2024-02-12 NOTE — ASSESSMENT & PLAN NOTE
Patient failed metformin therapy in the past due to GI upset  Patient will likely benefit from GLP-1 medication to help with metabolism support and blood sugar regulation

## 2024-02-12 NOTE — PROGRESS NOTES
Assessment/Plan:     Class 1 obesity due to excess calories with serious comorbidity and body mass index (BMI) of 33.0 to 33.9 in adult  - Patient is pursuing Conservative Program and follow up visits with medical weight management provider  - Initial weight loss goal of 5-10% weight loss for improved health  - Weight is not at goal and patient tried for more than 6 months to lose weight using various programs and tools, they were unable to achieve a meaningful weight loss above 5%  - Labs reviewed from 1/2023 - will be getting updated lab work with PCP  -Patient lifestyle habits were reviewed. Nutrition was discussed and patient will continue to work on protein intake to help with hunger - but will adjust these levels slowly still to avoid GI discomfort.    Medications were discussed and patient is not losing weight on phentermine. Her pulse is also averaging around 100 bpm when resting so will suggest discontinuation of this medication.  Mounjaro was discussed and will be prescribed to help treat her PCOS symptoms. Pen was demonstrated in the office and 2nd form of birth control recommendations were reviewed with patient. Patient voices understanding.  Mounjaro Instructions:    - Begin Mounjaro 2.5 mg subcutaneously once a week. Dose changes may occur after 4 doses if medication is tolerated. You will be assessed prior to each dose change to make sure you are tolerating the medication well.  - Please message me when you have 2 pens left from the prescription so there are no lapses in treatment.  - Visit [email or web address] for further information/injection instructions.   -Please eat small frequent meals to help reduce nausea. Lemon water and saltine crackers may help with this.   - Side effects of Mounjaro discussed: nausea, vomiting, diarrhea, and constipation. If you experience fever, nausea/vomiting, and pain radiating to your back this may be a sign of pancreatitis. Please go to the emergency room if this occurs.  -  If on oral birth control a 2nd method of birth control is recommended during the 1st 8 weeks of therapy and for 4 weeks after any dosage change.   - Patient understands the side effects of the medication and proper administration. Patient agrees with the treatment plan and all questions were answered.     Goals:  Calorie Goal: 4762-5750 calories per day  Do not skip meals.  Food log via yung or provided paper log (yung options include www.myfitnesspal.com, sparkpeople.com, loseit.com, calorieking.com, [employer])  No sugary beverages.   At least 64oz of water daily.  Increase physical activity by 10 minutes daily. Gradually increase physical activity to a goal of 5 days per week for 30 minutes of MODERATE intensity PLUS 2 days per week of FULL BODY resistance training     PCOS (polycystic ovarian syndrome)  Patient failed metformin therapy in the past due to GI upset  Patient will likely benefit from GLP-1 medication to help with metabolism support and blood sugar regulation    Renal calculus or stone  Qsymia and topiramate are not options for weight loss due to history of kidney stone         Marina was seen today for follow-up.    Diagnoses and all orders for this visit:    PCOS (polycystic ovarian syndrome)  -     tirzepatide (Mounjaro) 2.5 MG/0.5ML; Inject 0.5 mL (2.5 mg total) under the skin every 7 days    Class 1 obesity due to excess calories with serious comorbidity and body mass index (BMI) of 33.0 to 33.9 in adult    Renal calculus or stone        Patient denies personal history of pancreatitis. Patient also denies personal and family history of medullary thyroid cancer and multiple endocrine neoplasia type 2 (MEN 2 tumor).       Total time spent reviewing chart, interviewing patient, examining patient, discussing plan, answering all questions, and documentin minutes with >50% face-to-face time with the patient.    Follow up in approximately 2 months with Non-Surgical Physician/Advanced  Practitioner.    Subjective:   Chief Complaint   Patient presents with    Follow-up     Pt is here today for MWM f/u.        Patient ID: Marina Bhagat  is a 38 y.o. female with excess weight/obesity here to pursue weight management.  Patient is pursuing Conservative Program.   Most recent notes and records were reviewed.    HPI    Wt Readings from Last 20 Encounters:   02/12/24 79.7 kg (175 lb 9.6 oz)   12/12/23 79.5 kg (175 lb 3.2 oz)   11/13/23 77.1 kg (170 lb)   08/14/23 75.8 kg (167 lb 3.2 oz)   07/12/23 77.5 kg (170 lb 12.8 oz)   05/26/23 77 kg (169 lb 12.8 oz)   04/26/23 78.1 kg (172 lb 3.2 oz)   04/17/23 78.3 kg (172 lb 9.6 oz)   03/28/23 78.7 kg (173 lb 9.6 oz)   02/28/23 79.7 kg (175 lb 12.8 oz)   02/21/23 78.4 kg (172 lb 12.8 oz)   01/24/23 81.1 kg (178 lb 12.8 oz)   01/23/23 81.5 kg (179 lb 9.6 oz)   11/01/22 82.2 kg (181 lb 3.2 oz)   10/24/22 81.7 kg (180 lb 3.2 oz)   06/28/22 78.9 kg (174 lb)   06/06/22 79 kg (174 lb 3.2 oz)   05/26/22 78 kg (172 lb)   04/11/22 76.3 kg (168 lb 3.2 oz)   10/04/21 71.8 kg (158 lb 3.2 oz)       Patient presents today to medical weight management office for follow up.  Patient is currently on phentermine which is helping but still has trouble in the evening controlling. Patient will occasionally wake up and eat in the middle of the night if she hasn't eaten enough for dinner.  Patient would like to discuss injectable medications. She discussed coverage with insurance company and they will cover Mounjaro for PCOS. Patient had been on metformin in the past which cause GI upset      Weight loss medication and dose: none since Oct  Initial weight: 174.2 lbs in 6/2022  Current weight: 175.6 lbs (175.2 lbs last MWM OV)  Change in weight: +1.4 lbs (+0.6 lbs since lat OV)  Goal: 120 lbs    Starting BMI: 34.6 in 6/2022  Current BMI: 34.29    Waist Measurements:  8/2023: 35 in  12/2023: 37.5 in      Nutrition Prescription  Calories: 1,000-1,200 kcal  Protein: 60-85 g  Fluid: 52+  ounces      B- oatmeal OR skips OR fruit (banana)  L- yogurt with unsweetened apple sauce, 2 cheese sticks OR PB&J on whole grain  S- humus with baby carrots  D- eggs with veggies OR ramirez with chick peas and rice  S- popcorn    Hydration- black tea in the morning, water - 4 cups  Alcohol- rarely  Exercise- 30 minutes 5 days a week and 15 minute walk with dog      The following portions of the patient's history were reviewed and updated as appropriate: allergies, current medications, past family history, past medical history, past social history, past surgical history, and problem list.    Family History   Problem Relation Age of Onset    Depression Mother     Colorectal Cancer Mother 65    Basal cell carcinoma Father     No Known Problems Brother     Colon cancer Maternal Uncle     Breast cancer Cousin     Ovarian cancer Neg Hx         Review of Systems   Constitutional:  Negative for fatigue.   HENT:  Negative for sore throat.    Respiratory:  Negative for cough and shortness of breath.    Cardiovascular:  Negative for chest pain, palpitations and leg swelling.   Gastrointestinal:  Negative for abdominal pain, constipation, diarrhea and nausea.   Genitourinary:  Negative for dysuria.   Musculoskeletal:  Negative for arthralgias and back pain.   Skin:  Negative for rash.   Neurological:  Negative for headaches.   Psychiatric/Behavioral:  Negative for dysphoric mood. The patient is not nervous/anxious.        Objective:  /66 (BP Location: Left arm, Patient Position: Sitting, Cuff Size: Large)   Pulse 99   Ht 5' (1.524 m)   Wt 79.7 kg (175 lb 9.6 oz)   LMP 01/11/2024 (Approximate)   BMI 34.29 kg/m²     Physical Exam  Vitals and nursing note reviewed.   Constitutional:       Appearance: Normal appearance. She is obese.   HENT:      Head: Normocephalic.   Pulmonary:      Effort: Pulmonary effort is normal.   Neurological:      General: No focal deficit present.      Mental Status: She is alert and oriented  "to person, place, and time.   Psychiatric:         Mood and Affect: Mood normal.         Behavior: Behavior normal.         Thought Content: Thought content normal.         Judgment: Judgment normal.            Labs   Most recent labs reviewed   Lab Results   Component Value Date    SODIUM 134 (L) 01/10/2023    K 3.3 (L) 01/10/2023     01/10/2023    CO2 23 01/10/2023    AGAP 8 01/10/2023    BUN 10 01/10/2023    CREATININE 0.57 (L) 01/10/2023    GLUC 118 01/10/2023    CALCIUM 8.2 (L) 01/10/2023    AST 16 01/10/2023    ALT 11 01/10/2023    ALKPHOS 56 01/10/2023    TP 6.5 01/10/2023    TBILI 0.44 01/10/2023    EGFR 120 01/10/2023     Lab Results   Component Value Date    HGBA1C 5.4 01/30/2023     No results found for: \"[id number]\", \"TSH\"  Lab Results   Component Value Date    CHOLESTEROL 184 01/30/2023     Lab Results   Component Value Date    HDL 84 01/30/2023     Lab Results   Component Value Date    TRIG 56 01/30/2023     Lab Results   Component Value Date    LDLCALC 86 01/30/2023     "

## 2024-02-13 ENCOUNTER — TELEPHONE (OUTPATIENT)
Age: 39
End: 2024-02-13

## 2024-02-13 NOTE — TELEPHONE ENCOUNTER
PA for Mounjaro    Submitted via  []CMM-KEY    [x]Surescripts-Case ID # [id number]   []Faxed to plan   []Other website    []Phone call Case ID #      Office notes sent, clinical questions answered. Awaiting determination

## 2024-02-15 NOTE — TELEPHONE ENCOUNTER
PA for Mounjaro Denied    Reason:The request for coverage for MOUNJARO INJ 2.5/0.5, use as directed (2 per month), is denied. This  decision is based on health plan criteria for MOUNJARO INJ 2.5/0.5. This medicine is covered only if:  One of the following:  (1) Your doctor submits medical records (for example, chart notes) confirming diagnosis of type 2  diabetes mellitus as evidenced by one of the following laboratory values:  (A) A1C greater than or equal to 6.5%.  (B) Fasting plasma glucose greater than or equal to 126mg/dL.  (C) 2-hour plasma glucose greater than or equal to 200mg/dL during oral glucose tolerance test.  (2) If you require ongoing treatment for type 2 diabetes mellitus (that is, diagnosed greater than 2 years  ago), your doctor submits medical records (for example, chart notes) confirming diagnosis of type        Message sent to office clinical pool Yes    Denial letter scanned into Media Yes    Appeal started No

## 2024-02-19 DIAGNOSIS — E66.09 CLASS 1 OBESITY DUE TO EXCESS CALORIES WITH SERIOUS COMORBIDITY AND BODY MASS INDEX (BMI) OF 33.0 TO 33.9 IN ADULT: Primary | ICD-10-CM

## 2024-02-19 RX ORDER — NALTREXONE HYDROCHLORIDE 50 MG/1
TABLET, FILM COATED ORAL
Qty: 30 TABLET | Refills: 1 | Status: SHIPPED | OUTPATIENT
Start: 2024-02-19 | End: 2024-03-01

## 2024-02-20 ENCOUNTER — APPOINTMENT (OUTPATIENT)
Dept: LAB | Facility: CLINIC | Age: 39
End: 2024-02-20
Payer: COMMERCIAL

## 2024-02-20 DIAGNOSIS — Z13.6 SCREENING FOR CARDIOVASCULAR CONDITION: ICD-10-CM

## 2024-02-20 LAB
CHOLEST SERPL-MCNC: 210 MG/DL
EST. AVERAGE GLUCOSE BLD GHB EST-MCNC: 114 MG/DL
HBA1C MFR BLD: 5.6 %
HDLC SERPL-MCNC: 103 MG/DL
LDLC SERPL CALC-MCNC: 94 MG/DL (ref 0–100)
NONHDLC SERPL-MCNC: 107 MG/DL
TRIGL SERPL-MCNC: 63 MG/DL

## 2024-02-20 PROCEDURE — 80061 LIPID PANEL: CPT

## 2024-02-20 PROCEDURE — 83036 HEMOGLOBIN GLYCOSYLATED A1C: CPT

## 2024-02-20 PROCEDURE — 36415 COLL VENOUS BLD VENIPUNCTURE: CPT

## 2024-02-27 DIAGNOSIS — E66.09 CLASS 1 OBESITY DUE TO EXCESS CALORIES WITH SERIOUS COMORBIDITY AND BODY MASS INDEX (BMI) OF 33.0 TO 33.9 IN ADULT: ICD-10-CM

## 2024-02-27 NOTE — TELEPHONE ENCOUNTER
Good Day,    Pharmacy states medication on back order for past few weeks. No information when will be available. Please advise staff your thoughts.    Thank you

## 2024-02-28 ENCOUNTER — TELEPHONE (OUTPATIENT)
Dept: BARIATRICS | Facility: CLINIC | Age: 39
End: 2024-02-28

## 2024-02-28 NOTE — TELEPHONE ENCOUNTER
Please see if patient would like me to send it to a different pharmacy - she could check with grocery store or smaller independent pharmacies as they may have some in stock

## 2024-02-28 NOTE — TELEPHONE ENCOUNTER
Seth Pierce,    Wrote to patient on MY CHART. Informed medication not available and asked if she had any ideas for an alternate pharmacy. Awaiting call back or message.    Thank you

## 2024-03-01 RX ORDER — NALTREXONE HYDROCHLORIDE 50 MG/1
TABLET, FILM COATED ORAL
Qty: 30 TABLET | Refills: 1 | Status: SHIPPED | OUTPATIENT
Start: 2024-03-01

## 2024-04-15 ENCOUNTER — OFFICE VISIT (OUTPATIENT)
Dept: BARIATRICS | Facility: CLINIC | Age: 39
End: 2024-04-15
Payer: COMMERCIAL

## 2024-04-15 VITALS
HEIGHT: 60 IN | SYSTOLIC BLOOD PRESSURE: 110 MMHG | WEIGHT: 177 LBS | DIASTOLIC BLOOD PRESSURE: 70 MMHG | BODY MASS INDEX: 34.75 KG/M2 | HEART RATE: 93 BPM

## 2024-04-15 DIAGNOSIS — E66.01 CLASS 2 SEVERE OBESITY DUE TO EXCESS CALORIES WITH SERIOUS COMORBIDITY AND BODY MASS INDEX (BMI) OF 35.0 TO 35.9 IN ADULT (HCC): Primary | ICD-10-CM

## 2024-04-15 DIAGNOSIS — E66.9 OBESITY, CLASS II, BMI 35-39.9: ICD-10-CM

## 2024-04-15 PROBLEM — E66.812 CLASS 2 SEVERE OBESITY DUE TO EXCESS CALORIES WITH SERIOUS COMORBIDITY AND BODY MASS INDEX (BMI) OF 35.0 TO 35.9 IN ADULT (HCC): Status: ACTIVE | Noted: 2024-04-15

## 2024-04-15 PROCEDURE — 99214 OFFICE O/P EST MOD 30 MIN: CPT | Performed by: NURSE PRACTITIONER

## 2024-04-15 NOTE — ASSESSMENT & PLAN NOTE
- Patient is pursuing Conservative Program and follow up visits with medical weight management provider  - Initial weight loss goal of 5-10% weight loss for improved health  -Patient lifestyle habits were reviewed.  Patient will continue to be mindful of her nutrition and balance her calories given throughout the day to avoid skipping meals.  Patient will continue to focus on portion control and try to stay within a calorie range of 1000 to 1200 isamar/day.  Activity was discussed and patient was congratulated on starting a more regular exercise routine.  Medications were discussed and patient is tolerating bupropion/naltrexone with minimal side effects and patient notices a benefit from the medication.  Patient will maintain on this therapy and recheck in 2 months.      Goals:  Calorie Goal: 1545-1737 calories per day  Do not skip meals.  Food log via yung or provided paper log (yung options include www.[place] [employer].com, sparkpeople.com, loseit.com, calorieking.com, [employer])  No sugary beverages.   At least 64oz of water daily.  Increase physical activity by 10 minutes daily. Gradually increase physical activity to a goal of 5 days per week for 30 minutes of MODERATE intensity PLUS 2 days per week of FULL BODY resistance training

## 2024-05-06 ENCOUNTER — APPOINTMENT (OUTPATIENT)
Dept: RADIOLOGY | Facility: CLINIC | Age: 39
End: 2024-05-06
Payer: COMMERCIAL

## 2024-05-06 ENCOUNTER — OCCMED (OUTPATIENT)
Dept: URGENT CARE | Facility: CLINIC | Age: 39
End: 2024-05-06
Payer: OTHER MISCELLANEOUS

## 2024-05-06 DIAGNOSIS — M54.50 ACUTE LEFT-SIDED LOW BACK PAIN WITHOUT SCIATICA: Primary | ICD-10-CM

## 2024-05-06 DIAGNOSIS — M54.50 ACUTE LEFT-SIDED LOW BACK PAIN WITHOUT SCIATICA: ICD-10-CM

## 2024-05-06 PROCEDURE — 72100 X-RAY EXAM L-S SPINE 2/3 VWS: CPT

## 2024-05-06 PROCEDURE — 99203 OFFICE O/P NEW LOW 30 MIN: CPT | Performed by: STUDENT IN AN ORGANIZED HEALTH CARE EDUCATION/TRAINING PROGRAM

## 2024-05-12 NOTE — PROGRESS NOTES
Assessment/Plan:  UA CS ordered. Will call with any abnormalities.   Use caution with wiping. Call office with any worsening of symptoms.   Continue daily prenatal vitamin to prevent a neural tube defect in pregnancy.  Pap with high risk HPV testing every 5 years, if normal. Due   Sexually transmitted infection testing as indicated.Declined.   Exercise most days of week-minimum of 150-300 minutes per week.   Obtain appropriate diet and hydration.   Calcium 1000 mg (in divided doses-max 600 mg at one time) + 600 vit D daily.  Birth control refilled as requested and sent to pharmacy on file. Take as directed (ACHES reviewed). Benefits, risks and alternatives discussed/reviewed.   HPV 9 vaccine recommended through age 45. Check with your insurance for coverage. If covered, call office to schedule start of vaccine series. Not recommended in pregnancy.    Annual mammogram starting at age 40, monthly breast self exam recommended. .   Call your insurance company to verify coverage prior to completing any ordered tests.   Return to office in one year or sooner, if needed.        1. Encntr for gyn exam (general) (routine) w/o abn findings    2. Encounter for surveillance of contraceptive pills  -     levonorgestrel-ethinyl estradiol (Vienva) 0.1-20 MG-MCG per tablet; Take 1 tablet by mouth daily    3. Urinary urgency  -     Urinalysis with microscopic; Future  -     Urine culture; Future    4. HPV vaccine counseling               Subjective:      Patient ID: Marina Bhagat is a 39 y.o. female.    HPI    Marina Bhagat is a 39 y.o.   female who is here today for her annual visit. No gynecologic health concerns.   Monthly menses q 21 days x 5 days with light to mod flow on her TIFFANY. Mild cramping. Menses is acceptable.   Exercises-30 minutes 5 days per week. Still following with weight loss provider.    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     Marina Bhagat is sexually active with male partner of 9 years.  Getting  in February in [place]. Denies bleeding or dryness. Does admit to occ insertional dyspareunia that improved with PF PT. She no longer needs dilators. She is monogamous.   She uses aviane and condom for contraception.  Pregnancy desired March 2025. Started PNV 2 weeks ago.   She is not interested in STD screening today.   She denies vaginal  itching or pelvic pain. Does intermittently has increased vaginal discharge and urethra  burning postvoid for the last 2 weeks.   She has no urinary concerns, does nog have incontinence.  No bowel concerns.  No breast concerns.     Last pap: 03/15/2021 normal pap with Negative HR HPV   DEXA scan: Not on file  Mammogram: Not on file   Colonoscopy: Not on file  HPV vaccine series: no    Family history of cancer:   Cancer-related family history includes Breast cancer in her cousin; Colon cancer in her maternal uncle. There is no history of Ovarian cancer.      The following portions of the patient's history were reviewed and updated as appropriate: allergies, current medications, past family history, past medical history, past social history, past surgical history, and problem list.    Review of Systems   Constitutional: Negative.  Negative for activity change, appetite change, chills, diaphoresis, fatigue, fever and unexpected weight change.   HENT:  Negative for congestion, dental problem, sneezing, sore throat and trouble swallowing.    Eyes:  Negative for visual disturbance.   Respiratory:  Negative for chest tightness and shortness of breath.    Cardiovascular:  Negative for chest pain and leg swelling.   Gastrointestinal:  Negative for abdominal pain, constipation, diarrhea, nausea and vomiting.   Genitourinary:  Positive for dyspareunia (occ), dysuria, urgency and vaginal discharge. Negative for difficulty urinating, frequency, hematuria, menstrual problem, pelvic pain, vaginal bleeding and vaginal pain.   Musculoskeletal:  Negative for back pain and neck pain.  "  Skin: Negative.    Allergic/Immunologic: Negative.    Neurological:  Negative for weakness and headaches.   Hematological:  Negative for adenopathy.   Psychiatric/Behavioral: Negative.           Objective:      /70 (BP Location: Left arm, Patient Position: Sitting, Cuff Size: Standard)   Ht 4' 11\" (1.499 m)   Wt 81.9 kg (180 lb 9.6 oz)   LMP 05/05/2024 (Exact Date)   BMI 36.48 kg/m²          Physical Exam  Vitals and nursing note reviewed.   Constitutional:       Appearance: Normal appearance. She is well-developed.      Comments: Bmi 36   HENT:      Head: Normocephalic and atraumatic.   Eyes:      General:         Right eye: No discharge.         Left eye: No discharge.   Neck:      Thyroid: No thyromegaly.      Trachea: Trachea normal.   Cardiovascular:      Rate and Rhythm: Normal rate and regular rhythm.      Heart sounds: Normal heart sounds.   Pulmonary:      Effort: Pulmonary effort is normal.      Breath sounds: Normal breath sounds.   Chest:   Breasts:     Breasts are symmetrical.      Right: Tenderness present. No inverted nipple, mass, nipple discharge or skin change.      Left: Tenderness present. No inverted nipple, mass, nipple discharge or skin change.      Comments: Lifetime breast tenderness with any palpation.   Abdominal:      Palpations: Abdomen is soft.   Genitourinary:     Exam position: Lithotomy position.      Labia:         Right: No rash, tenderness, lesion or injury.         Left: No rash, tenderness, lesion or injury.       Urethra: No prolapse, urethral pain, urethral swelling or urethral lesion.      Vagina: No signs of injury and foreign body. Tenderness present. No vaginal discharge, erythema or bleeding.      Cervix: No cervical motion tenderness or friability.      Uterus: Normal.       Adnexa:         Right: No mass, tenderness or fullness.          Left: No mass, tenderness or fullness.        Rectum: No external hemorrhoid.      Comments: Guarded during " exam.  Difficult to see labia minora,  face of cervix or do a thorough bimanual exam due to guarding. Slight linear area of excoriation near urethra. Possibly related to wiping or other trauma??  Musculoskeletal:         General: Normal range of motion.      Cervical back: Normal range of motion and neck supple.   Lymphadenopathy:      Head:      Right side of head: No submental, submandibular or tonsillar adenopathy.      Left side of head: No submental, submandibular or tonsillar adenopathy.      Cervical: No cervical adenopathy.      Upper Body:      Right upper body: No supraclavicular or axillary adenopathy.      Left upper body: No supraclavicular or axillary adenopathy.      Lower Body: No right inguinal adenopathy. No left inguinal adenopathy.   Skin:     General: Skin is warm and dry.   Neurological:      Mental Status: She is alert and oriented to person, place, and time.   Psychiatric:         Mood and Affect: Mood normal.         Behavior: Behavior normal.

## 2024-05-13 ENCOUNTER — ANNUAL EXAM (OUTPATIENT)
Dept: OBGYN CLINIC | Facility: CLINIC | Age: 39
End: 2024-05-13
Payer: COMMERCIAL

## 2024-05-13 VITALS
DIASTOLIC BLOOD PRESSURE: 70 MMHG | BODY MASS INDEX: 36.41 KG/M2 | HEIGHT: 59 IN | SYSTOLIC BLOOD PRESSURE: 110 MMHG | WEIGHT: 180.6 LBS

## 2024-05-13 DIAGNOSIS — Z30.41 ENCOUNTER FOR SURVEILLANCE OF CONTRACEPTIVE PILLS: ICD-10-CM

## 2024-05-13 DIAGNOSIS — Z71.85 HPV VACCINE COUNSELING: ICD-10-CM

## 2024-05-13 DIAGNOSIS — R39.15 URINARY URGENCY: ICD-10-CM

## 2024-05-13 DIAGNOSIS — Z01.419 ENCNTR FOR GYN EXAM (GENERAL) (ROUTINE) W/O ABN FINDINGS: Primary | ICD-10-CM

## 2024-05-13 PROCEDURE — 99395 PREV VISIT EST AGE 18-39: CPT | Performed by: NURSE PRACTITIONER

## 2024-05-13 RX ORDER — NAPROXEN 500 MG/1
TABLET ORAL
COMMUNITY
Start: 2024-05-06 | End: 2024-05-13 | Stop reason: ALTCHOICE

## 2024-05-13 RX ORDER — METHOCARBAMOL 750 MG/1
TABLET, FILM COATED ORAL
COMMUNITY
Start: 2024-05-06

## 2024-05-13 RX ORDER — LEVONORGESTREL/ETHIN.ESTRADIOL 0.1-0.02MG
1 TABLET ORAL DAILY
Qty: 84 TABLET | Refills: 3 | Status: SHIPPED | OUTPATIENT
Start: 2024-05-13

## 2024-05-13 NOTE — PATIENT INSTRUCTIONS
UA CS ordered. Will call with any abnormalities.   Use caution with wiping. Call office with any worsening of symptoms.   Continue daily prenatal vitamin to prevent a neural tube defect in pregnancy.  Pap with high risk HPV testing every 5 years, if normal. Due 2026  Sexually transmitted infection testing as indicated.Declined.   Exercise most days of week-minimum of 150-300 minutes per week.   Obtain appropriate diet and hydration.   Calcium 1000 mg (in divided doses-max 600 mg at one time) + 600 vit D daily.  Birth control refilled as requested and sent to pharmacy on file. Take as directed (ACHES reviewed). Benefits, risks and alternatives discussed/reviewed.   HPV 9 vaccine recommended through age 45. Check with your insurance for coverage. If covered, call office to schedule start of vaccine series. Not recommended in pregnancy.    Annual mammogram starting at age 40, monthly breast self exam recommended. .   Call your insurance company to verify coverage prior to completing any ordered tests.   Return to office in one year or sooner, if needed.

## 2024-06-14 DIAGNOSIS — E66.09 CLASS 1 OBESITY DUE TO EXCESS CALORIES WITH SERIOUS COMORBIDITY AND BODY MASS INDEX (BMI) OF 33.0 TO 33.9 IN ADULT: ICD-10-CM

## 2024-06-14 RX ORDER — NALTREXONE HYDROCHLORIDE 50 MG/1
TABLET, FILM COATED ORAL
Qty: 30 TABLET | Refills: 1 | Status: SHIPPED | OUTPATIENT
Start: 2024-06-14

## 2024-07-09 ENCOUNTER — APPOINTMENT (OUTPATIENT)
Dept: LAB | Facility: CLINIC | Age: 39
End: 2024-07-09
Payer: COMMERCIAL

## 2024-07-09 DIAGNOSIS — R39.15 URINARY URGENCY: ICD-10-CM

## 2024-07-09 LAB
BACTERIA UR QL AUTO: ABNORMAL /HPF
BILIRUB UR QL STRIP: NEGATIVE
CLARITY UR: CLEAR
COLOR UR: ABNORMAL
GLUCOSE UR STRIP-MCNC: NEGATIVE MG/DL
HGB UR QL STRIP.AUTO: NEGATIVE
KETONES UR STRIP-MCNC: NEGATIVE MG/DL
LEUKOCYTE ESTERASE UR QL STRIP: ABNORMAL
NITRITE UR QL STRIP: NEGATIVE
NON-SQ EPI CELLS URNS QL MICRO: ABNORMAL /HPF
PH UR STRIP.AUTO: 6 [PH]
PROT UR STRIP-MCNC: NEGATIVE MG/DL
RBC #/AREA URNS AUTO: ABNORMAL /HPF
SP GR UR STRIP.AUTO: 1.02 (ref 1–1.03)
UROBILINOGEN UR STRIP-ACNC: <2 MG/DL
WBC #/AREA URNS AUTO: ABNORMAL /HPF

## 2024-07-09 PROCEDURE — 87147 CULTURE TYPE IMMUNOLOGIC: CPT

## 2024-07-09 PROCEDURE — 87086 URINE CULTURE/COLONY COUNT: CPT

## 2024-07-09 PROCEDURE — 81001 URINALYSIS AUTO W/SCOPE: CPT

## 2024-07-11 LAB
BACTERIA UR CULT: ABNORMAL
BACTERIA UR CULT: ABNORMAL

## 2024-08-21 ENCOUNTER — OFFICE VISIT (OUTPATIENT)
Dept: BARIATRICS | Facility: CLINIC | Age: 39
End: 2024-08-21
Payer: COMMERCIAL

## 2024-08-21 VITALS
HEART RATE: 92 BPM | DIASTOLIC BLOOD PRESSURE: 80 MMHG | HEIGHT: 60 IN | WEIGHT: 186 LBS | SYSTOLIC BLOOD PRESSURE: 110 MMHG | BODY MASS INDEX: 36.52 KG/M2

## 2024-08-21 DIAGNOSIS — E66.01 CLASS 2 SEVERE OBESITY DUE TO EXCESS CALORIES WITH SERIOUS COMORBIDITY AND BODY MASS INDEX (BMI) OF 36.0 TO 36.9 IN ADULT (HCC): Primary | ICD-10-CM

## 2024-08-21 DIAGNOSIS — E66.01 CLASS 2 SEVERE OBESITY DUE TO EXCESS CALORIES WITH SERIOUS COMORBIDITY AND BODY MASS INDEX (BMI) OF 35.0 TO 35.9 IN ADULT (HCC): ICD-10-CM

## 2024-08-21 PROBLEM — E66.811 CLASS 1 OBESITY DUE TO EXCESS CALORIES WITH SERIOUS COMORBIDITY AND BODY MASS INDEX (BMI) OF 33.0 TO 33.9 IN ADULT: Status: RESOLVED | Noted: 2023-05-26 | Resolved: 2024-08-21

## 2024-08-21 PROBLEM — E66.09 CLASS 1 OBESITY DUE TO EXCESS CALORIES WITH SERIOUS COMORBIDITY AND BODY MASS INDEX (BMI) OF 33.0 TO 33.9 IN ADULT: Status: RESOLVED | Noted: 2023-05-26 | Resolved: 2024-08-21

## 2024-08-21 PROCEDURE — 99214 OFFICE O/P EST MOD 30 MIN: CPT | Performed by: NURSE PRACTITIONER

## 2024-08-21 NOTE — ASSESSMENT & PLAN NOTE
- Patient is pursuing Conservative Program and follow up visits with medical weight management provider  - Initial weight loss goal of 5-10% weight loss for improved health. Weight loss can improve patient's co-morbid conditions and/or prevent weight-related complications.  - Explained the importance of continuing lifestyle changes in addition to any anti-obesity medications.   - Labs reviewed from 2/2024    General Recommendations:  Nutrition:  Eat breakfast daily.  Do not skip meals.      Food log (ie.) www.[employer].com, sparkpeople.com, loseit.com, calorieking.com, etc.     Practice mindful eating.  Be sure to set aside time to eat, eat slowly, and savor your food.     Hydration:    At least 64oz of water daily.  No sugar sweetened beverages.  No juice (eat the fruit instead).     Exercise:  Studies have shown that the ideal exercise goal is somewhere between 150 to 300 minutes of moderate intensity exercise a week.  Start with exercising 10 minutes every other day and gradually increase physical activity with a goal of at least 150 minutes of moderate intensity exercise a week, divided over at least 3 days a week.  An example of this would be exercising 30 minutes a day, 5 days a week.  Resistance training can increase muscle mass and increase our resting metabolic rate.   FULL BODY resistance training is recommended 2-3 times a week.  Do not do this on consecutive days to allow for muscle recovery.     Aim for a bare minimum 5000 steps, even on days you do not exercise.     Monitoring:   Weigh yourself daily.  If this causes undue stress, then just weigh yourself once a week.  Weigh yourself the same time of the day with the same amount of clothing on.  Preferably this should be done after waking up, before you eat, and with no clothing or minimal clothing on.     Specific Goals:  Calorie goal:  4071-2495 isamar/day   Patient lifestyle habits were reviewed and nutrition was discussed.  Patient will continue to  track her calories and try to stay within the recommended range of 1000 to 1200 isamar/day.  She was advised to start to log her protein intake so she is consuming at least 60 to 70 g of protein daily.  A protein shake guide was given to patient to try different brands of protein shakes to see if she would tolerate the taste better.  Making sure to evenly spaced out the calories and avoid skipping meals can also help with appetite.    Medications were discussed and patient will be placed back on phentermine and a low-dose of 8 mg daily.  She will monitor her heart rate on her Apple Watch and if her heart rate stays under 90 she will advance the dose to 8 mg in the morning and 8 mg at lunchtime.  She will return to the office in 6 to 8 weeks for an evaluation of her heart rate and blood pressure.

## 2024-08-21 NOTE — PROGRESS NOTES
Assessment/Plan:     Class 2 severe obesity due to excess calories with serious comorbidity and body mass index (BMI) of 35.0 to 35.9 in adult (HCC)  - Patient is pursuing Conservative Program and follow up visits with medical weight management provider  - Initial weight loss goal of 5-10% weight loss for improved health. Weight loss can improve patient's co-morbid conditions and/or prevent weight-related complications.  - Explained the importance of continuing lifestyle changes in addition to any anti-obesity medications.   - Labs reviewed from 2/2024    General Recommendations:  Nutrition:  Eat breakfast daily.  Do not skip meals.      Food log (ie.) www.[employer].com, sparkpeople.com, loseit.com, [employer]ur[employer].com, etc.     Practice mindful eating.  Be sure to set aside time to eat, eat slowly, and savor your food.     Hydration:    At least 64oz of water daily.  No sugar sweetened beverages.  No juice (eat the fruit instead).     Exercise:  Studies have shown that the ideal exercise goal is somewhere between 150 to 300 minutes of moderate intensity exercise a week.  Start with exercising 10 minutes every other day and gradually increase physical activity with a goal of at least 150 minutes of moderate intensity exercise a week, divided over at least 3 days a week.  An example of this would be exercising 30 minutes a day, 5 days a week.  Resistance training can increase muscle mass and increase our resting metabolic rate.   FULL BODY resistance training is recommended 2-3 times a week.  Do not do this on consecutive days to allow for muscle recovery.     Aim for a bare minimum 5000 steps, even on days you do not exercise.     Monitoring:   Weigh yourself daily.  If this causes undue stress, then just weigh yourself once a week.  Weigh yourself the same time of the day with the same amount of clothing on.  Preferably this should be done after waking up, before you eat, and with no clothing or minimal clothing  "on.     Specific Goals:  Calorie goal:  8382-3370 isamar/day   Patient lifestyle habits were reviewed and nutrition was discussed.  Patient will continue to track her calories and try to stay within the recommended range of 1000 to 1200 isamar/day.  She was advised to start to log her protein intake so she is consuming at least 60 to 70 g of protein daily.  A protein shake guide was given to patient to try different brands of protein shakes to see if she would tolerate the taste better.  Making sure to evenly spaced out the calories and avoid skipping meals can also help with appetite.    Medications were discussed and patient will be placed back on phentermine and a low-dose of 8 mg daily.  She will monitor her heart rate on her Apple Watch and if her heart rate stays under 90 she will advance the dose to 8 mg in the morning and 8 mg at lunchtime.  She will return to the office in 6 to 8 weeks for an evaluation of her heart rate and blood pressure.         Marina was seen today for follow-up.    Diagnoses and all orders for this visit:    Class 2 severe obesity due to excess calories with serious comorbidity and body mass index (BMI) of 36.0 to 36.9 in adult ([facility])  -     Phentermine HCl 8 MG TABS; Take 1 tablet (8 mg total) by mouth 2 (two) times a day    Class 2 severe obesity due to excess calories with serious comorbidity and body mass index (BMI) of 35.0 to 35.9 in adult ([facility])      EKG reviewed from   Patient remains on oral birth control.    Total time spent reviewing chart, interviewing patient, examining patient, discussing plan, answering all questions, and documentin minutes with >50% face-to-face time with the patient.    Follow up in approximately 2 months with Non-Surgical Physician/Advanced Practitioner.    Subjective:   Chief Complaint   Patient presents with    Follow-up     Pt is here for MWM f/u. Waist - 39\"       Patient ID: Marina Bhagat  is a 39 y.o. female with excess weight/obesity " here to pursue weight management.  Patient is pursuing Conservative Program.   Most recent notes and records were reviewed.    HPI    Wt Readings from Last 20 Encounters:   08/21/24 84.4 kg (186 lb)   05/13/24 81.9 kg (180 lb 9.6 oz)   04/15/24 80.3 kg (177 lb)   02/12/24 79.7 kg (175 lb 9.6 oz)   12/12/23 79.5 kg (175 lb 3.2 oz)   11/13/23 77.1 kg (170 lb)   08/14/23 75.8 kg (167 lb 3.2 oz)   07/12/23 77.5 kg (170 lb 12.8 oz)   05/26/23 77 kg (169 lb 12.8 oz)   04/26/23 78.1 kg (172 lb 3.2 oz)   04/17/23 78.3 kg (172 lb 9.6 oz)   03/28/23 78.7 kg (173 lb 9.6 oz)   02/28/23 79.7 kg (175 lb 12.8 oz)   02/21/23 78.4 kg (172 lb 12.8 oz)   01/24/23 81.1 kg (178 lb 12.8 oz)   01/23/23 81.5 kg (179 lb 9.6 oz)   11/01/22 82.2 kg (181 lb 3.2 oz)   10/24/22 81.7 kg (180 lb 3.2 oz)   06/28/22 78.9 kg (174 lb)   06/06/22 79 kg (174 lb 3.2 oz)       Patient presents today to medical weight management office for follow up.  Patient had been maintaining on bupropion and naltrexone to help with cravings but patient does not feel like the medication is helping as good as phentermine to for her appetite.  She had been taken off phentermine due to tachycardia but would like to consider going back on a lower dose.  Patient reports that she feels hungry all the time and finds it difficult to stay within her calorie range due to her appetite.  She continues to track her food and is averaging about 1500 isamar/day.  She wakes up in the middle of the night hungry and does not feel satisfied for most of her meals.  Patient had been on metformin but was stopped due to GI upset.  She does not have coverage for GLP-1 medications except for a diagnosis of diabetes.  Patient has a history of kidney stones so topiramate is contraindicated.  Patient is on bupropion for depression and would like to maintain on this medication.      Weight loss medication and dose: Bupropion/naltrexone  Initial weight: 174.2 lbs in 6/2022  Current weight: 186 lbs (177  "lbs last MWM OV)  Change in weight: +11.8 lbs (+9 lbs since lat OV)  Goal: 120 lbs    Starting BMI: 34.6 in 6/2022  Current BMI: 36.94    Waist Measurements:  8/2023: 35 in  8/2024: 39 in      Nutrition Prescription  Calories: 1,000-1,200 kcal  Protein: 60-85 g  Fluid: 52+ ounces      B- instant grits and fried eg  S- HB eggs sometimes  L- sandwich and cookie  S- humus with baby carrots  D- factor meals  S- popcorn    Hydration- black tea in the morning, water - 4 cups  Alcohol- rarely  Exercise- 30 minutes 5 days a week and 15 minute walk with dog      The following portions of the patient's history were reviewed and updated as appropriate: allergies, current medications, past family history, past medical history, past social history, past surgical history, and problem list.    Family History   Problem Relation Age of Onset    Depression Mother     Colorectal Cancer Mother 65    Basal cell carcinoma Father     No Known Problems Brother     Colon cancer Maternal Uncle     Breast cancer Cousin     Ovarian cancer Neg Hx         Review of Systems   Constitutional:  Negative for fatigue.   HENT:  Negative for sore throat.    Respiratory:  Negative for cough and shortness of breath.    Cardiovascular:  Negative for chest pain, palpitations and leg swelling.   Gastrointestinal:  Negative for abdominal pain, constipation, diarrhea and nausea.   Genitourinary:  Negative for dysuria.   Musculoskeletal:  Negative for arthralgias and back pain.   Skin:  Negative for rash.   Neurological:  Negative for headaches.   Psychiatric/Behavioral:  Negative for dysphoric mood. The patient is not nervous/anxious.        Objective:  /80   Pulse 92   Ht 4' 11.5\" (1.511 m)   Wt 84.4 kg (186 lb)   LMP 07/26/2024 (Exact Date)   BMI 36.94 kg/m²     Physical Exam  Vitals and nursing note reviewed.   Constitutional:       Appearance: Normal appearance. She is obese.   HENT:      Head: Normocephalic.   Pulmonary:      Effort: Pulmonary " effort is normal.   Neurological:      General: No focal deficit present.      Mental Status: She is alert and oriented to person, place, and time.   Psychiatric:         Mood and Affect: Mood normal.         Behavior: Behavior normal.         Thought Content: Thought content normal.         Judgment: Judgment normal.            Labs   Most recent labs reviewed   Lab Results   Component Value Date    SODIUM 134 (L) 01/10/2023    K 3.3 (L) 01/10/2023     01/10/2023    CO2 23 01/10/2023    AGAP 8 01/10/2023    BUN 10 01/10/2023    CREATININE 0.57 (L) 01/10/2023    GLUC 118 01/10/2023    CALCIUM 8.2 (L) 01/10/2023    AST 16 01/10/2023    ALT 11 01/10/2023    ALKPHOS 56 01/10/2023    TP 6.5 01/10/2023    TBILI 0.44 01/10/2023    EGFR 120 01/10/2023     Lab Results   Component Value Date    HGBA1C 5.6 02/20/2024     Lab Results   Component Value Date    TSH 2.49 06/07/2022     Lab Results   Component Value Date    CHOLESTEROL 210 (H) 02/20/2024     Lab Results   Component Value Date     02/20/2024     Lab Results   Component Value Date    TRIG 63 02/20/2024     Lab Results   Component Value Date    LDLCALC 94 02/20/2024

## 2024-10-16 ENCOUNTER — OFFICE VISIT (OUTPATIENT)
Dept: BARIATRICS | Facility: CLINIC | Age: 39
End: 2024-10-16
Payer: COMMERCIAL

## 2024-10-16 VITALS
BODY MASS INDEX: 35.97 KG/M2 | HEIGHT: 60 IN | WEIGHT: 183.2 LBS | RESPIRATION RATE: 16 BRPM | SYSTOLIC BLOOD PRESSURE: 122 MMHG | HEART RATE: 98 BPM | DIASTOLIC BLOOD PRESSURE: 80 MMHG

## 2024-10-16 DIAGNOSIS — E66.812 CLASS 2 SEVERE OBESITY DUE TO EXCESS CALORIES WITH SERIOUS COMORBIDITY AND BODY MASS INDEX (BMI) OF 36.0 TO 36.9 IN ADULT (HCC): ICD-10-CM

## 2024-10-16 DIAGNOSIS — E66.01 CLASS 2 SEVERE OBESITY DUE TO EXCESS CALORIES WITH SERIOUS COMORBIDITY AND BODY MASS INDEX (BMI) OF 36.0 TO 36.9 IN ADULT (HCC): ICD-10-CM

## 2024-10-16 PROCEDURE — 99214 OFFICE O/P EST MOD 30 MIN: CPT | Performed by: NURSE PRACTITIONER

## 2024-10-16 NOTE — PROGRESS NOTES
Assessment/Plan:     Class 2 severe obesity due to excess calories with serious comorbidity and body mass index (BMI) of 36.0 to 36.9 in adult (HCC)  - Patient is pursuing Conservative Program and follow up visits with medical weight management provider  - Initial weight loss goal of 5-10% weight loss for improved health. Weight loss can improve patient's co-morbid conditions and/or prevent weight-related complications.  - Explained the importance of continuing lifestyle changes in addition to any anti-obesity medications.   - Labs reviewed from 1/2024 and 2/2024 - has upcoming labs with PCP     General Recommendations:  Nutrition:  Eat breakfast daily.  Do not skip meals.      Food log (ie.) [email or web address], sparkpeople.com, loseit.com, calorieking.com, etc.     Practice mindful eating.  Be sure to set aside time to eat, eat slowly, and savor your food.     Hydration:    At least 64oz of water daily.  No sugar sweetened beverages.  No juice (eat the fruit instead).     Exercise:  Studies have shown that the ideal exercise goal is somewhere between 150 to 300 minutes of moderate intensity exercise a week.  Start with exercising 10 minutes every other day and gradually increase physical activity with a goal of at least 150 minutes of moderate intensity exercise a week, divided over at least 3 days a week.  An example of this would be exercising 30 minutes a day, 5 days a week.  Resistance training can increase muscle mass and increase our resting metabolic rate.   FULL BODY resistance training is recommended 2-3 times a week.  Do not do this on consecutive days to allow for muscle recovery.     Aim for a bare minimum 5000 steps, even on days you do not exercise.     Monitoring:   Weigh yourself daily.  If this causes undue stress, then just weigh yourself once a week.  Weigh yourself the same time of the day with the same amount of clothing on.  Preferably this should be done after waking up, before you eat, and  with no clothing or minimal clothing on.     Specific Goals:  Calorie goal:  2284-8740 isamar/day   Patient lifestyle habits were reviewed and nutrition was discussed.  She was congratulated on starting to increase her protein by trying out protein shakes. Patient will continue to track her calories and try to stay within the recommended range of 1000 to 1200 isamar/day. She was encouraged to continue to make sure to evenly space out the calories and avoid skipping meals.  Medications were discussed and patient will continue on phentermine 8 mg twice daily as she is tolerating well and sees that it is helping her appetite.  She will continue to monitor her heart rate and call if her heart rate resting goes above 100 bpm. She will follow up every 2-3 months for monitoring.         Marina was seen today for follow-up.    Diagnoses and all orders for this visit:    Class 2 severe obesity due to excess calories with serious comorbidity and body mass index (BMI) of 36.0 to 36.9 in adult (HCC)  -     Phentermine HCl 8 MG TABS; Take 1 tablet (8 mg total) by mouth 2 (two) times a day        Total time spent reviewing chart, interviewing patient, examining patient, discussing plan, answering all questions, and documentin minutes with >50% face-to-face time with the patient.    Follow up in approximately 2 months with Non-Surgical Physician/Advanced Practitioner.    Subjective:   Chief Complaint   Patient presents with    Follow-up       Patient ID: Marina Bhagat  is a 39 y.o. female with excess weight/obesity here to pursue weight management.  Patient is pursuing Conservative Program.   Most recent notes and records were reviewed.    HPI    Wt Readings from Last 20 Encounters:   10/16/24 83.1 kg (183 lb 3.2 oz)   24 84.4 kg (186 lb)   24 81.9 kg (180 lb 9.6 oz)   04/15/24 80.3 kg (177 lb)   24 79.7 kg (175 lb 9.6 oz)   23 79.5 kg (175 lb 3.2 oz)   23 77.1 kg (170 lb)   23 75.8 kg  (167 lb 3.2 oz)   07/12/23 77.5 kg (170 lb 12.8 oz)   05/26/23 77 kg (169 lb 12.8 oz)   04/26/23 78.1 kg (172 lb 3.2 oz)   04/17/23 78.3 kg (172 lb 9.6 oz)   03/28/23 78.7 kg (173 lb 9.6 oz)   02/28/23 79.7 kg (175 lb 12.8 oz)   02/21/23 78.4 kg (172 lb 12.8 oz)   01/24/23 81.1 kg (178 lb 12.8 oz)   01/23/23 81.5 kg (179 lb 9.6 oz)   11/01/22 82.2 kg (181 lb 3.2 oz)   10/24/22 81.7 kg (180 lb 3.2 oz)   06/28/22 78.9 kg (174 lb)       Patient presents today to medical weight management office for follow up.  Patient was restarted on phentermine at her last office visit.  She had previously been on 30 mg but her heart rate was elevated.  She is now on 8 mg twice daily and her heart rate is controlled in the 80s and 90s at home when she is monitoring.  She denies any palpitations or racing heartbeat.  Patient also states that this been more helpful to suppress her appetite than when she had previously been on bupropion and naltrexone.   She continues to track her food and is staying between 5767-0314 calories per day.  She is also using a walking pad to stay more active during the day.      Weight loss medication and dose: phentermine 8 mg  Initial weight: 174.2 lbs in 6/2022  Current weight: 183.2 lbs (186 lbs last MWM OV)  Change in weight: +9 lbs (-2.8 lbs since lat OV)  Goal: 120 lbs    Starting BMI: 34.6 in 6/2022  Current BMI: 36.38    Waist Measurements:  8/2023: 35 in  8/2024: 39 in      Nutrition Prescription  Calories: 1,000-1,200 kcal  Protein: 60-85 g  Fluid: 52+ ounces      B- instant grits and fried eg  S- HB eggs sometimes  L- sandwich and cookie  S- humus with baby carrots  D- factor meals  S- popcorn    Hydration- black tea in the morning, water - 4 cups  Alcohol- rarely  Exercise- 30 minutes 5 days a week and 15 minute walk with dog      The following portions of the patient's history were reviewed and updated as appropriate: allergies, current medications, past family history, past medical history,  "past social history, past surgical history, and problem list.    Family History   Problem Relation Age of Onset    Depression Mother     Colorectal Cancer Mother 65    Basal cell carcinoma Father     No Known Problems Brother     Colon cancer Maternal Uncle     Breast cancer Cousin     Ovarian cancer Neg Hx         Review of Systems   Constitutional:  Negative for fatigue.   HENT:  Negative for sore throat.    Respiratory:  Negative for cough and shortness of breath.    Cardiovascular:  Negative for chest pain, palpitations and leg swelling.   Gastrointestinal:  Negative for abdominal pain, constipation, diarrhea and nausea.   Genitourinary:  Negative for dysuria.   Musculoskeletal:  Negative for arthralgias and back pain.   Skin:  Negative for rash.   Neurological:  Negative for headaches.   Psychiatric/Behavioral:  Negative for dysphoric mood. The patient is not nervous/anxious.        Objective:  /80 (BP Location: Left arm, Patient Position: Sitting, Cuff Size: Large)   Pulse 98   Resp 16   Ht 4' 11.5\" (1.511 m)   Wt 83.1 kg (183 lb 3.2 oz)   BMI 36.38 kg/m²     Physical Exam  Vitals and nursing note reviewed.   Constitutional:       Appearance: Normal appearance. She is obese.   HENT:      Head: Normocephalic.   Pulmonary:      Effort: Pulmonary effort is normal.   Neurological:      General: No focal deficit present.      Mental Status: She is alert and oriented to person, place, and time.   Psychiatric:         Mood and Affect: Mood normal.         Behavior: Behavior normal.         Thought Content: Thought content normal.         Judgment: Judgment normal.            Labs   Most recent labs reviewed   Lab Results   Component Value Date    SODIUM 134 (L) 01/10/2023    K 3.3 (L) 01/10/2023     01/10/2023    CO2 23 01/10/2023    AGAP 8 01/10/2023    BUN 10 01/10/2023    CREATININE 0.57 (L) 01/10/2023    GLUC 118 01/10/2023    CALCIUM 8.2 (L) 01/10/2023    AST 16 01/10/2023    ALT 11 01/10/2023 "    ALKPHOS 56 01/10/2023    TP 6.5 01/10/2023    TBILI 0.44 01/10/2023    EGFR 120 01/10/2023     Lab Results   Component Value Date    HGBA1C 5.6 02/20/2024     Lab Results   Component Value Date    TSH 2.49 06/07/2022     Lab Results   Component Value Date    CHOLESTEROL 210 (H) 02/20/2024     Lab Results   Component Value Date     02/20/2024     Lab Results   Component Value Date    TRIG 63 02/20/2024     Lab Results   Component Value Date    LDLCALC 94 02/20/2024

## 2024-10-16 NOTE — ASSESSMENT & PLAN NOTE
- Patient is pursuing Conservative Program and follow up visits with medical weight management provider  - Initial weight loss goal of 5-10% weight loss for improved health. Weight loss can improve patient's co-morbid conditions and/or prevent weight-related complications.  - Explained the importance of continuing lifestyle changes in addition to any anti-obesity medications.   - Labs reviewed from 1/2024 and 2/2024 - has upcoming labs with PCP     General Recommendations:  Nutrition:  Eat breakfast daily.  Do not skip meals.      Food log (ie.) www.[email or web address].com, sparkpeople.com, loseit.com, calorieking.com, etc.     Practice mindful eating.  Be sure to set aside time to eat, eat slowly, and savor your food.     Hydration:    At least 64oz of water daily.  No sugar sweetened beverages.  No juice (eat the fruit instead).     Exercise:  Studies have shown that the ideal exercise goal is somewhere between 150 to 300 minutes of moderate intensity exercise a week.  Start with exercising 10 minutes every other day and gradually increase physical activity with a goal of at least 150 minutes of moderate intensity exercise a week, divided over at least 3 days a week.  An example of this would be exercising 30 minutes a day, 5 days a week.  Resistance training can increase muscle mass and increase our resting metabolic rate.   FULL BODY resistance training is recommended 2-3 times a week.  Do not do this on consecutive days to allow for muscle recovery.     Aim for a bare minimum 5000 steps, even on days you do not exercise.     Monitoring:   Weigh yourself daily.  If this causes undue stress, then just weigh yourself once a week.  Weigh yourself the same time of the day with the same amount of clothing on.  Preferably this should be done after waking up, before you eat, and with no clothing or minimal clothing on.     Specific Goals:  Calorie goal:  7889-8678 isamar/day   Patient lifestyle habits were reviewed and nutrition  was discussed.  She was congratulated on starting to increase her protein by trying out protein shakes. Patient will continue to track her calories and try to stay within the recommended range of 1000 to 1200 isamar/day. She was encouraged to continue to make sure to evenly space out the calories and avoid skipping meals.  Medications were discussed and patient will continue on phentermine 8 mg twice daily as she is tolerating well and sees that it is helping her appetite.  She will continue to monitor her heart rate and call if her heart rate resting goes above 100 bpm. She will follow up every 2-3 months for monitoring.

## 2025-04-03 ENCOUNTER — OFFICE VISIT (OUTPATIENT)
Dept: BARIATRICS | Facility: CLINIC | Age: 40
End: 2025-04-03
Payer: COMMERCIAL

## 2025-04-03 VITALS
SYSTOLIC BLOOD PRESSURE: 110 MMHG | HEIGHT: 60 IN | BODY MASS INDEX: 38.01 KG/M2 | HEART RATE: 89 BPM | DIASTOLIC BLOOD PRESSURE: 68 MMHG | WEIGHT: 193.6 LBS | OXYGEN SATURATION: 99 %

## 2025-04-03 DIAGNOSIS — E55.9 VITAMIN D DEFICIENCY: ICD-10-CM

## 2025-04-03 DIAGNOSIS — E28.2 PCOS (POLYCYSTIC OVARIAN SYNDROME): ICD-10-CM

## 2025-04-03 DIAGNOSIS — E66.01 CLASS 2 SEVERE OBESITY DUE TO EXCESS CALORIES WITH SERIOUS COMORBIDITY AND BODY MASS INDEX (BMI) OF 38.0 TO 38.9 IN ADULT (HCC): Primary | ICD-10-CM

## 2025-04-03 DIAGNOSIS — E78.5 HYPERLIPIDEMIA, UNSPECIFIED HYPERLIPIDEMIA TYPE: ICD-10-CM

## 2025-04-03 DIAGNOSIS — E66.812 CLASS 2 SEVERE OBESITY DUE TO EXCESS CALORIES WITH SERIOUS COMORBIDITY AND BODY MASS INDEX (BMI) OF 38.0 TO 38.9 IN ADULT (HCC): Primary | ICD-10-CM

## 2025-04-03 PROCEDURE — 99214 OFFICE O/P EST MOD 30 MIN: CPT | Performed by: NURSE PRACTITIONER

## 2025-04-03 RX ORDER — CLONAZEPAM 0.5 MG/1
TABLET ORAL
COMMUNITY
Start: 2025-04-01

## 2025-04-03 RX ORDER — TIRZEPATIDE 2.5 MG/.5ML
2.5 INJECTION, SOLUTION SUBCUTANEOUS WEEKLY
Qty: 2 ML | Refills: 0 | Status: SHIPPED | OUTPATIENT
Start: 2025-04-03 | End: 2025-05-01

## 2025-04-03 NOTE — ASSESSMENT & PLAN NOTE
- Patient is pursuing Conservative Program and follow up visits with medical weight management provider  - Initial weight loss goal of 5-10% weight loss for improved health. Weight loss can improve patient's co-morbid conditions and/or prevent weight-related complications.  - Explained the importance of continuing lifestyle changes in addition to any anti-obesity medications.   - Labs reviewed from 1/2024 and 2/2024 -new labs ordered today     General Recommendations:  Nutrition:  Eat breakfast daily.  Do not skip meals.      Food log (ie.) www.[email or web address], sparkpeople.com, loseit.com, calorieking.com, etc.     Practice mindful eating.  Be sure to set aside time to eat, eat slowly, and savor your food.     Hydration:    At least 64oz of water daily.  No sugar sweetened beverages.  No juice (eat the fruit instead).     Exercise:  Studies have shown that the ideal exercise goal is somewhere between 150 to 300 minutes of moderate intensity exercise a week.  Start with exercising 10 minutes every other day and gradually increase physical activity with a goal of at least 150 minutes of moderate intensity exercise a week, divided over at least 3 days a week.  An example of this would be exercising 30 minutes a day, 5 days a week.  Resistance training can increase muscle mass and increase our resting metabolic rate.   FULL BODY resistance training is recommended 2-3 times a week.  Do not do this on consecutive days to allow for muscle recovery.     Aim for a bare minimum 5000 steps, even on days you do not exercise.     Monitoring:   Weigh yourself daily.  If this causes undue stress, then just weigh yourself once a week.  Weigh yourself the same time of the day with the same amount of clothing on.  Preferably this should be done after waking up, before you eat, and with no clothing or minimal clothing on.     Specific Goals:  Calorie goal:  6093-6962 isamar/day   Patient lifestyle habits were reviewed.  Nutrition was  discussed and patient will continue to balance her calories throughout the day and utilize factor meals as needed for convenience.  She was encouraged to possibly meet with the dietitian again to help structure her nutrition and make sure she is consuming adequate calories.  Medications were discussed and patient now has coverage for GLP-1 medications and would like to pursue this therapy.  Zepbound was discussed and patient is in agreement to try this medication.    Zepbound Instructions:    - Begin Zepbound 2.5 mg subcutaneously once a week. Dose changes may occur after 4 doses if medication is tolerated. You will be assessed prior to each dose change to make sure you are tolerating the medication well.  - Please message me when you have 2 pens left from the prescription so there are no lapses in treatment.  - If you have been off the medication for more than 14 days please contact the office as you will need to restart the titration at the starting dose again to avoid significant side effects or adverse events.  - Visit Zepbound.com for further information/injection instructions.   -Please eat small frequent meals to help reduce nausea. Lemon water and saltine crackers may help with this.   - Side effects of Zepbound discussed: nausea, vomiting, diarrhea, and constipation. If you experience fever, nausea/vomiting, and pain radiating to your back this may be a sign of pancreatitis. Please go to the emergency room if this occurs.  - If on oral birth control a 2nd method of birth control is recommended during the 1st 8 weeks of therapy and for 4 weeks after any dosage change.   - Patient understands the side effects of the medication and proper administration. Patient agrees with the treatment plan and all questions were answered.  -Supply concerns were discussed with patient and patient voiced understanding that they will need to call with adequate time for refills to avoid any lapses in treatment.  Patient may  also have to call around to different pharmacies to see if any pharmacy has the appropriate dose in stock.  -Pen demonstrated in the office today

## 2025-04-03 NOTE — PROGRESS NOTES
Assessment/Plan:     Class 2 severe obesity due to excess calories with serious comorbidity and body mass index (BMI) of 38.0 to 38.9 in adult (HCC)  - Patient is pursuing Conservative Program and follow up visits with medical weight management provider  - Initial weight loss goal of 5-10% weight loss for improved health. Weight loss can improve patient's co-morbid conditions and/or prevent weight-related complications.  - Explained the importance of continuing lifestyle changes in addition to any anti-obesity medications.   - Labs reviewed from 1/2024 and 2/2024 -new labs ordered today     General Recommendations:  Nutrition:  Eat breakfast daily.  Do not skip meals.      Food log (ie.) www.[email or web address], sparkpeople.com, loseit.com, calorieking.com, etc.     Practice mindful eating.  Be sure to set aside time to eat, eat slowly, and savor your food.     Hydration:    At least 64oz of water daily.  No sugar sweetened beverages.  No juice (eat the fruit instead).     Exercise:  Studies have shown that the ideal exercise goal is somewhere between 150 to 300 minutes of moderate intensity exercise a week.  Start with exercising 10 minutes every other day and gradually increase physical activity with a goal of at least 150 minutes of moderate intensity exercise a week, divided over at least 3 days a week.  An example of this would be exercising 30 minutes a day, 5 days a week.  Resistance training can increase muscle mass and increase our resting metabolic rate.   FULL BODY resistance training is recommended 2-3 times a week.  Do not do this on consecutive days to allow for muscle recovery.     Aim for a bare minimum 5000 steps, even on days you do not exercise.     Monitoring:   Weigh yourself daily.  If this causes undue stress, then just weigh yourself once a week.  Weigh yourself the same time of the day with the same amount of clothing on.  Preferably this should be done after waking up, before you eat, and with  no clothing or minimal clothing on.     Specific Goals:  Calorie goal:  0836-3160 isamar/day   Patient lifestyle habits were reviewed.  Nutrition was discussed and patient will continue to balance her calories throughout the day and utilize factor meals as needed for convenience.  She was encouraged to possibly meet with the dietitian again to help structure her nutrition and make sure she is consuming adequate calories.  Medications were discussed and patient now has coverage for GLP-1 medications and would like to pursue this therapy.  Zepbound was discussed and patient is in agreement to try this medication.    Zepbound Instructions:    - Begin Zepbound 2.5 mg subcutaneously once a week. Dose changes may occur after 4 doses if medication is tolerated. You will be assessed prior to each dose change to make sure you are tolerating the medication well.  - Please message me when you have 2 pens left from the prescription so there are no lapses in treatment.  - If you have been off the medication for more than 14 days please contact the office as you will need to restart the titration at the starting dose again to avoid significant side effects or adverse events.  - Visit Zepbound.com for further information/injection instructions.   -Please eat small frequent meals to help reduce nausea. Lemon water and saltine crackers may help with this.   - Side effects of Zepbound discussed: nausea, vomiting, diarrhea, and constipation. If you experience fever, nausea/vomiting, and pain radiating to your back this may be a sign of pancreatitis. Please go to the emergency room if this occurs.  - If on oral birth control a 2nd method of birth control is recommended during the 1st 8 weeks of therapy and for 4 weeks after any dosage change.   - Patient understands the side effects of the medication and proper administration. Patient agrees with the treatment plan and all questions were answered.  -Supply concerns were discussed with  patient and patient voiced understanding that they will need to call with adequate time for refills to avoid any lapses in treatment.  Patient may also have to call around to different pharmacies to see if any pharmacy has the appropriate dose in stock.  -Pen demonstrated in the office today    PCOS (polycystic ovarian syndrome)  Patient did not tolerate metformin in the past  May improve PCOS symptoms on GLP-1 therapy         Marina was seen today for follow-up.    Diagnoses and all orders for this visit:    Class 2 severe obesity due to excess calories with serious comorbidity and body mass index (BMI) of 38.0 to 38.9 in adult (HCC)  -     tirzepatide (Zepbound) 2.5 mg/0.5 mL auto-injector; Inject 0.5 mL (2.5 mg total) under the skin once a week for 28 days  -     Vitamin D 25 hydroxy; Future  -     Hemoglobin A1C; Future  -     TSH, 3rd generation with Free T4 reflex; Future  -     Lipid panel; Future  -     Comprehensive metabolic panel; Future  -     CBC and differential; Future    PCOS (polycystic ovarian syndrome)  -     tirzepatide (Zepbound) 2.5 mg/0.5 mL auto-injector; Inject 0.5 mL (2.5 mg total) under the skin once a week for 28 days  -     Hemoglobin A1C; Future  -     TSH, 3rd generation with Free T4 reflex; Future  -     Lipid panel; Future  -     Comprehensive metabolic panel; Future    Vitamin D deficiency  -     Vitamin D 25 hydroxy; Future    Hyperlipidemia, unspecified hyperlipidemia type      Patient denies personal history of pancreatitis. Patient also denies personal and family history of medullary thyroid cancer and multiple endocrine neoplasia type 2 (MEN 2 tumor).   Patient is currently on oral birth control    Total time spent reviewing chart, interviewing patient, examining patient, discussing plan, answering all questions, and documentin minutes with >50% face-to-face time with the patient.    Follow up in approximately 3 months with Non-Surgical Physician/Advanced  Practitioner.    Subjective:   Chief Complaint   Patient presents with    Follow-up       Patient ID: Marina Bhagat  is a 39 y.o. female with excess weight/obesity here to pursue weight management.  Patient is pursuing Conservative Program.   Most recent notes and records were reviewed.    HPI    Wt Readings from Last 20 Encounters:   04/03/25 87.8 kg (193 lb 9.6 oz)   10/16/24 83.1 kg (183 lb 3.2 oz)   08/21/24 84.4 kg (186 lb)   05/13/24 81.9 kg (180 lb 9.6 oz)   04/15/24 80.3 kg (177 lb)   02/12/24 79.7 kg (175 lb 9.6 oz)   12/12/23 79.5 kg (175 lb 3.2 oz)   11/13/23 77.1 kg (170 lb)   08/14/23 75.8 kg (167 lb 3.2 oz)   07/12/23 77.5 kg (170 lb 12.8 oz)   05/26/23 77 kg (169 lb 12.8 oz)   04/26/23 78.1 kg (172 lb 3.2 oz)   04/17/23 78.3 kg (172 lb 9.6 oz)   03/28/23 78.7 kg (173 lb 9.6 oz)   02/28/23 79.7 kg (175 lb 12.8 oz)   02/21/23 78.4 kg (172 lb 12.8 oz)   01/24/23 81.1 kg (178 lb 12.8 oz)   01/23/23 81.5 kg (179 lb 9.6 oz)   11/01/22 82.2 kg (181 lb 3.2 oz)   10/24/22 81.7 kg (180 lb 3.2 oz)       Patient presents today to medical weight management office for follow up.  Patient has been on and off phentermine for the past couple of years but has not seen significant weight loss.  She got  this past year and now has been insurance that will likely cover GLP-1 medications.  She would like to pursue this therapy as it has been something she has not tried in the past to get her weight controlled.  Patient has been tried on bupropion and naltrexone, phentermine and metformin without any success.  She is contraindicated for topiramate due to her history of kidney stones.  Patient continues to monitor her nutrition and is utilizing factor meals and other sources to keep her nutrition balanced.  She is also trying to stay active exercising 3 times a week.     Weight loss medication and dose: phentermine 8 mg  Initial weight: 174.2 lbs in 6/2022  Current weight: 193.6 lbs (183.2 lbs (186 lbs last MWM  OV)  Change in weight: +9 lbs (+10.4  lbs since lat OV)  Percentage of weight loss:   Goal: 120 lbs    Starting BMI: 34.6 in 6/2022  Current BMI: 36.38    Waist Measurements:  8/2023: 35 in  8/2024: 39 in    MWM Weight Tracker:  2/2024: 176.5 lbs (+0.6 lbs)  4/2024: 177 lbs (+0.5 lbs)  8/2024: 186 lbs (+9 lbs) *start phentermine 8mg  10/2024: 183.2 lbs (-2.8 lbs)  4/2025: 193.6 (+10.4 lbs)      Nutrition Prescription  Calories: 1,000-1,200 kcal  Protein: 60-85 g  Fluid: 52+ ounces      B- skips OR instant grits and fried egg  S- HB eggs sometimes  L- sandwich and cookie  S- humus with baby carrots  D- factor meals  S- popcorn    Hydration- black tea in the morning, water - 4 cups  Alcohol- rarely  Exercise- 30 minutes 5 days a week and 15 minute walk with dog      The following portions of the patient's history were reviewed and updated as appropriate: allergies, current medications, past family history, past medical history, past social history, past surgical history, and problem list.    Family History   Problem Relation Age of Onset    Depression Mother     Colorectal Cancer Mother 65    Cancer Mother         Colorectal    Mental illness Mother         Depression    Basal cell carcinoma Father     COPD Father     No Known Problems Brother     Dementia Maternal Grandmother     Dementia Maternal Grandfather     Colon cancer Maternal Uncle     Breast cancer Cousin     Cancer Maternal Uncle         Colon    Ovarian cancer Neg Hx         Review of Systems   Constitutional:  Negative for fatigue.   HENT:  Negative for sore throat.    Respiratory:  Negative for cough and shortness of breath.    Cardiovascular:  Negative for chest pain, palpitations and leg swelling.   Gastrointestinal:  Negative for abdominal pain, constipation, diarrhea and nausea.   Genitourinary:  Negative for dysuria.   Musculoskeletal:  Negative for arthralgias and back pain.   Skin:  Negative for rash.   Neurological:  Negative for headaches.  "  Psychiatric/Behavioral:  Negative for dysphoric mood. The patient is not nervous/anxious.        Objective:  /68   Pulse 89   Ht 4' 11.5\" (1.511 m)   Wt 87.8 kg (193 lb 9.6 oz)   LMP 04/02/2025 (Exact Date)   SpO2 99%   BMI 38.45 kg/m²     Physical Exam  Vitals and nursing note reviewed.   Constitutional:       Appearance: Normal appearance. She is obese.   HENT:      Head: Normocephalic.   Pulmonary:      Effort: Pulmonary effort is normal.   Neurological:      General: No focal deficit present.      Mental Status: She is alert and oriented to person, place, and time.   Psychiatric:         Mood and Affect: Mood normal.         Behavior: Behavior normal.         Thought Content: Thought content normal.         Judgment: Judgment normal.            Labs   Most recent labs reviewed   Lab Results   Component Value Date    SODIUM 134 (L) 01/10/2023    K 3.3 (L) 01/10/2023     01/10/2023    CO2 23 01/10/2023    AGAP 8 01/10/2023    BUN 10 01/10/2023    CREATININE 0.57 (L) 01/10/2023    GLUC 118 01/10/2023    CALCIUM 8.2 (L) 01/10/2023    AST 16 01/10/2023    ALT 11 01/10/2023    ALKPHOS 56 01/10/2023    TP 6.5 01/10/2023    TBILI 0.44 01/10/2023    EGFR 120 01/10/2023     Lab Results   Component Value Date    HGBA1C 5.6 02/20/2024     Lab Results   Component Value Date    TSH 2.49 06/07/2022     Lab Results   Component Value Date    CHOLESTEROL 210 (H) 02/20/2024     Lab Results   Component Value Date     02/20/2024     Lab Results   Component Value Date    TRIG 63 02/20/2024     Lab Results   Component Value Date    LDLCALC 94 02/20/2024     "

## 2025-04-04 ENCOUNTER — TELEPHONE (OUTPATIENT)
Dept: BARIATRICS | Facility: CLINIC | Age: 40
End: 2025-04-04

## 2025-04-23 DIAGNOSIS — Z30.41 ENCOUNTER FOR SURVEILLANCE OF CONTRACEPTIVE PILLS: ICD-10-CM

## 2025-04-23 RX ORDER — TIMOLOL MALEATE 5 MG/ML
1 SOLUTION/ DROPS OPHTHALMIC DAILY
Qty: 84 TABLET | Refills: 0 | Status: SHIPPED | OUTPATIENT
Start: 2025-04-23

## 2025-04-28 DIAGNOSIS — E66.812 CLASS 2 SEVERE OBESITY DUE TO EXCESS CALORIES WITH SERIOUS COMORBIDITY AND BODY MASS INDEX (BMI) OF 38.0 TO 38.9 IN ADULT (HCC): Primary | ICD-10-CM

## 2025-04-28 DIAGNOSIS — E66.01 CLASS 2 SEVERE OBESITY DUE TO EXCESS CALORIES WITH SERIOUS COMORBIDITY AND BODY MASS INDEX (BMI) OF 38.0 TO 38.9 IN ADULT (HCC): Primary | ICD-10-CM

## 2025-04-28 RX ORDER — TIRZEPATIDE 5 MG/.5ML
5 INJECTION, SOLUTION SUBCUTANEOUS WEEKLY
Qty: 2 ML | Refills: 1 | Status: SHIPPED | OUTPATIENT
Start: 2025-04-28 | End: 2025-06-23

## 2025-05-16 NOTE — PROGRESS NOTES
Name: Marina Bhagat      : 1985      MRN: [id number]  Encounter Provider: KAYKAY Babb  Encounter Date: 2025   Encounter department: [facility] [place]  :  Assessment & Plan  Encntr for gyn exam (general) (routine) w/o abn findings  Pap with high risk HPV testing every 5 years, if normal. Due   Sexually transmitted infection testing as indicated.Declined.   Exercise most days of week-minimum of 150-300 minutes per week.   Obtain appropriate diet and hydration.   Calcium 1000 mg (in divided doses-max 600 mg at one time) + 600 vit D daily.  Birth control refilled as requested and sent to pharmacy on file. Take as directed (ACHES reviewed). Benefits, risks and alternatives discussed/reviewed.   She is aware of conflicting information about GLP-1 medications and the use of contraceptives and their efficacy. There was a pregnancy noted in  on a patient on monjaro and an oral contraceptive.   There is a recommendations to switch to a non oral contraceptive method, or add a barrier method of contraception for four weeks after initiation and four weeks after each dose escalation. She is using a condom with coitus.   Aware that fertility does increase as weight decreases.   Compliance with taking her pill every day at the same time is recommended.  If you have vomiting or diarrhea within 30 minutes of taking your pill, treat that as if you missed a pill and consider use of a back up method of contraception.   HPV 9 vaccine recommended through age 45. Check with your insurance for coverage. If covered, call office to schedule start of vaccine series.    Annual mammogram starting at age 40, monthly breast self exam recommended.   Recommend follow up with pelvic floor PT.   Call your insurance company to verify coverage prior to completing any ordered tests.   Return to office in one year or sooner, if needed.        Encounter for surveillance of  contraceptive pills    Orders:    levonorgestrel-ethinyl estradiol (Vienva) 0.1-20 MG-MCG per tablet; Take 1 tablet by mouth daily    Dyspareunia in female    Orders:    Ambulatory Referral to Physical Therapy; Future    HPV vaccine counseling             History of Present Illness   HPI  Marina Bhagat is a 40 y.o.  female who is here today for her annual visit. No gynecologic health concerns.   Monthly menses x 4-5 days with mod flow. Menses is acceptable.  Exercise- 30 minutes most days of the weeks; taking zepbound currently. She is aware of the risk with this med and the use of her TIFFANY.   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      Marina Bhagat is sexually active with male partner of 10 years. Got  in February in [place]. Honeymoon planned for November.   Denies bleeding or dryness. Does admit to occ insertional and some thrusting dyspareunia that improved with PF PT. She has not recently used her dilators but is thinking she may need to restart. She is monogamous.   She uses aviane and condom for contraception.  Pregnancy desired  2025. Taking 2 weeks ago.   She is not interested in STD screening today.   She denies vaginal  itching, discharge or pelvic pain.   She has no urinary concerns, does not have incontinence.  No bowel concerns.  No breast concerns.     Last pap: 03/15/2021 normal pap with negative HR HPV   Mammogram: Not on file   Colonoscopy: Not on file  DEXA scan: Not on file  HPV vaccine series: No    Family history of cancer:   Cancer-related family history includes Breast cancer in her cousin; Cancer in her maternal uncle and mother; Colon cancer in her maternal uncle. There is no history of Ovarian cancer.    History obtained from: patient    Review of Systems   Constitutional: Negative.  Negative for activity change, appetite change, chills, diaphoresis, fatigue, fever and unexpected weight change.   HENT:  Negative for congestion, dental problem, sneezing,  sore throat and trouble swallowing.    Eyes:  Negative for visual disturbance.   Respiratory:  Negative for chest tightness and shortness of breath.    Cardiovascular:  Negative for chest pain and leg swelling.   Gastrointestinal:  Positive for nausea (suspected to be from her GLP-1 med). Negative for abdominal pain, constipation, diarrhea and vomiting.   Genitourinary:  Positive for dyspareunia. Negative for difficulty urinating, dysuria, frequency, hematuria, menstrual problem, pelvic pain, urgency, vaginal bleeding, vaginal discharge and vaginal pain.   Musculoskeletal:  Negative for back pain and neck pain.   Skin: Negative.    Allergic/Immunologic: Negative.    Neurological:  Negative for weakness and headaches.   Hematological:  Negative for adenopathy.   Psychiatric/Behavioral: Negative.       Medical History Reviewed by provider this encounter:  Tobacco  Allergies  Meds  Problems  Med Hx  Surg Hx  Fam Hx     .  Medications Ordered Prior to Encounter[1]   Social History     Tobacco Use    Smoking status: Never    Smokeless tobacco: Never   Vaping Use    Vaping status: Never Used   Substance and Sexual Activity    Alcohol use: Yes     Comment: rare alcohol use    Drug use: No    Sexual activity: Yes     Partners: Male     Birth control/protection: OCP, Condom Male        Objective   /80   Wt 83 kg (183 lb)   LMP 05/05/2025 (Approximate) Comment: HAD SOME SPOTTING A COUPLE DAYS AGO  BMI 36.34 kg/m²      Physical Exam  Vitals and nursing note reviewed.   Constitutional:       Appearance: Normal appearance. She is well-developed.      Comments: BMI 36   HENT:      Head: Normocephalic and atraumatic.     Eyes:      General:         Right eye: No discharge.         Left eye: No discharge.     Neck:      Thyroid: No thyromegaly.      Trachea: Trachea normal.     Cardiovascular:      Rate and Rhythm: Normal rate and regular rhythm.      Heart sounds: Normal heart sounds.   Pulmonary:      Effort:  Pulmonary effort is normal.      Breath sounds: Normal breath sounds.   Chest:   Breasts:     Breasts are symmetrical.      Right: Normal. No inverted nipple, mass, nipple discharge, skin change or tenderness.      Left: Normal. No inverted nipple, mass, nipple discharge, skin change or tenderness.   Abdominal:      Palpations: Abdomen is soft.   Genitourinary:     General: Normal vulva.      Exam position: Lithotomy position.      Labia:         Right: No rash, tenderness, lesion or injury.         Left: No rash, tenderness, lesion or injury.       Urethra: No prolapse, urethral pain, urethral swelling or urethral lesion.      Vagina: No signs of injury and foreign body. Tenderness present. No vaginal discharge, erythema or bleeding.      Cervix: No cervical motion tenderness or cervical bleeding.      Uterus: Normal.       Adnexa:         Right: No mass, tenderness or fullness.          Left: No mass, tenderness or fullness.        Rectum: No external hemorrhoid.      Comments: Very guarded during pelvic exam.   Not able to tolerate a  speculum opening.   No obvious palpable masses. Physical exam limited by guarding and habitus.     Musculoskeletal:         General: Normal range of motion.      Cervical back: Normal range of motion and neck supple.   Lymphadenopathy:      Head:      Right side of head: No submental, submandibular or tonsillar adenopathy.      Left side of head: No submental, submandibular or tonsillar adenopathy.      Cervical: No cervical adenopathy.      Upper Body:      Right upper body: No supraclavicular or axillary adenopathy.      Left upper body: No supraclavicular or axillary adenopathy.      Lower Body: No right inguinal adenopathy. No left inguinal adenopathy.     Skin:     General: Skin is warm and dry.     Neurological:      Mental Status: She is alert and oriented to person, place, and time.     Psychiatric:         Mood and Affect: Mood normal.         Behavior: Behavior normal.                 [1]   Current Outpatient Medications on File Prior to Visit   Medication Sig Dispense Refill    buPROPion (WELLBUTRIN XL) 300 mg 24 hr tablet Take 300 mg by mouth daily      citalopram (CeleXA) 20 mg tablet Take 20 mg by mouth daily at bedtime      clonazePAM (KlonoPIN) 0.5 mg tablet       omeprazole (PriLOSEC) 20 mg delayed release capsule Take 20 mg by mouth daily      Prenatal Vit-Fe Fumarate-FA (PRENATAL PO) Take by mouth      tirzepatide (Zepbound) 5 mg/0.5 mL auto-injector Inject 0.5 mL (5 mg total) under the skin once a week 2 mL 1    [DISCONTINUED] diphenhydrAMINE (BENADRYL) 25 mg tablet Take 25 mg by mouth every 6 (six) hours as needed for itching 2 tabs daily as needed (Patient not taking: Reported on 4/3/2025)      [DISCONTINUED] methocarbamol (ROBAXIN) 750 mg tablet TAKE 1 TABLET BY MOUTH BEFORE BED (Patient not taking: Reported on 8/21/2024)       No current facility-administered medications on file prior to visit.

## 2025-05-19 ENCOUNTER — ANNUAL EXAM (OUTPATIENT)
Dept: OBGYN CLINIC | Facility: CLINIC | Age: 40
End: 2025-05-19
Payer: COMMERCIAL

## 2025-05-19 VITALS — DIASTOLIC BLOOD PRESSURE: 80 MMHG | BODY MASS INDEX: 36.34 KG/M2 | SYSTOLIC BLOOD PRESSURE: 102 MMHG | WEIGHT: 183 LBS

## 2025-05-19 DIAGNOSIS — N94.10 DYSPAREUNIA IN FEMALE: ICD-10-CM

## 2025-05-19 DIAGNOSIS — Z71.85 HPV VACCINE COUNSELING: ICD-10-CM

## 2025-05-19 DIAGNOSIS — Z30.41 ENCOUNTER FOR SURVEILLANCE OF CONTRACEPTIVE PILLS: ICD-10-CM

## 2025-05-19 DIAGNOSIS — Z01.419 ENCNTR FOR GYN EXAM (GENERAL) (ROUTINE) W/O ABN FINDINGS: Primary | ICD-10-CM

## 2025-05-19 PROCEDURE — S0612 ANNUAL GYNECOLOGICAL EXAMINA: HCPCS | Performed by: NURSE PRACTITIONER

## 2025-05-19 RX ORDER — LEVONORGESTREL/ETHIN.ESTRADIOL 0.1-0.02MG
1 TABLET ORAL DAILY
Qty: 84 TABLET | Refills: 3 | Status: SHIPPED | OUTPATIENT
Start: 2025-05-19

## 2025-05-19 NOTE — PATIENT INSTRUCTIONS
Pap with high risk HPV testing every 5 years, if normal. Due 2026  Sexually transmitted infection testing as indicated.Declined.   Exercise most days of week-minimum of 150-300 minutes per week.   Obtain appropriate diet and hydration.   Calcium 1000 mg (in divided doses-max 600 mg at one time) + 600 vit D daily.  Birth control refilled as requested and sent to pharmacy on file. Take as directed (ACHES reviewed). Benefits, risks and alternatives discussed/reviewed.   She is aware of conflicting information about GLP-1 medications and the use of contraceptives and their efficacy. There was a pregnancy noted in 2023 on a patient on monjaro and an oral contraceptive.   There is a recommendations to switch to a non oral contraceptive method, or add a barrier method of contraception for four weeks after initiation and four weeks after each dose escalation. She is using a condom with coitus.   Aware that fertility does increase as weight decreases.   Compliance with taking her pill every day at the same time is recommended.  If you have vomiting or diarrhea within 30 minutes of taking your pill, treat that as if you missed a pill and consider use of a back up method of contraception.   HPV 9 vaccine recommended through age 45. Check with your insurance for coverage. If covered, call office to schedule start of vaccine series.    Annual mammogram starting at age 40, monthly breast self exam recommended.   Recommend follow up with pelvic floor PT.   Call your insurance company to verify coverage prior to completing any ordered tests.   Return to office in one year or sooner, if needed.

## 2025-06-03 DIAGNOSIS — E66.812 CLASS 2 SEVERE OBESITY DUE TO EXCESS CALORIES WITH SERIOUS COMORBIDITY AND BODY MASS INDEX (BMI) OF 38.0 TO 38.9 IN ADULT (HCC): ICD-10-CM

## 2025-06-03 DIAGNOSIS — E66.01 CLASS 2 SEVERE OBESITY DUE TO EXCESS CALORIES WITH SERIOUS COMORBIDITY AND BODY MASS INDEX (BMI) OF 38.0 TO 38.9 IN ADULT (HCC): ICD-10-CM

## 2025-06-04 RX ORDER — TIRZEPATIDE 5 MG/.5ML
5 INJECTION, SOLUTION SUBCUTANEOUS WEEKLY
Qty: 2 ML | Refills: 0 | Status: SHIPPED | OUTPATIENT
Start: 2025-06-04 | End: 2025-07-30

## 2025-06-29 DIAGNOSIS — E66.01 CLASS 2 SEVERE OBESITY DUE TO EXCESS CALORIES WITH SERIOUS COMORBIDITY AND BODY MASS INDEX (BMI) OF 38.0 TO 38.9 IN ADULT (HCC): ICD-10-CM

## 2025-06-29 DIAGNOSIS — E66.812 CLASS 2 SEVERE OBESITY DUE TO EXCESS CALORIES WITH SERIOUS COMORBIDITY AND BODY MASS INDEX (BMI) OF 38.0 TO 38.9 IN ADULT (HCC): ICD-10-CM

## 2025-06-30 RX ORDER — TIRZEPATIDE 5 MG/.5ML
5 INJECTION, SOLUTION SUBCUTANEOUS WEEKLY
Qty: 2 ML | Refills: 0 | Status: SHIPPED | OUTPATIENT
Start: 2025-06-30 | End: 2025-08-25

## 2025-07-05 ENCOUNTER — APPOINTMENT (OUTPATIENT)
Dept: LAB | Facility: CLINIC | Age: 40
End: 2025-07-05
Payer: COMMERCIAL

## 2025-07-05 DIAGNOSIS — E55.9 VITAMIN D DEFICIENCY: ICD-10-CM

## 2025-07-05 DIAGNOSIS — E28.2 PCOS (POLYCYSTIC OVARIAN SYNDROME): ICD-10-CM

## 2025-07-05 DIAGNOSIS — Z00.8 HEALTH EXAMINATION IN POPULATION SURVEY: ICD-10-CM

## 2025-07-05 DIAGNOSIS — E66.01 CLASS 2 SEVERE OBESITY DUE TO EXCESS CALORIES WITH SERIOUS COMORBIDITY AND BODY MASS INDEX (BMI) OF 38.0 TO 38.9 IN ADULT (HCC): ICD-10-CM

## 2025-07-05 DIAGNOSIS — E66.812 CLASS 2 SEVERE OBESITY DUE TO EXCESS CALORIES WITH SERIOUS COMORBIDITY AND BODY MASS INDEX (BMI) OF 38.0 TO 38.9 IN ADULT (HCC): ICD-10-CM

## 2025-07-05 LAB
25(OH)D3 SERPL-MCNC: 12.6 NG/ML (ref 30–100)
ALBUMIN SERPL BCG-MCNC: 4.5 G/DL (ref 3.5–5)
ALP SERPL-CCNC: 64 U/L (ref 34–104)
ALT SERPL W P-5'-P-CCNC: 15 U/L (ref 7–52)
ANION GAP SERPL CALCULATED.3IONS-SCNC: 6 MMOL/L (ref 4–13)
AST SERPL W P-5'-P-CCNC: 13 U/L (ref 13–39)
BASOPHILS # BLD AUTO: 0.02 THOUSANDS/ÂΜL (ref 0–0.1)
BASOPHILS NFR BLD AUTO: 0 % (ref 0–1)
BILIRUB SERPL-MCNC: 0.38 MG/DL (ref 0.2–1)
BUN SERPL-MCNC: 8 MG/DL (ref 5–25)
CALCIUM SERPL-MCNC: 9 MG/DL (ref 8.4–10.2)
CHLORIDE SERPL-SCNC: 104 MMOL/L (ref 96–108)
CHOLEST SERPL-MCNC: 214 MG/DL (ref ?–200)
CO2 SERPL-SCNC: 27 MMOL/L (ref 21–32)
CREAT SERPL-MCNC: 0.78 MG/DL (ref 0.6–1.3)
EOSINOPHIL # BLD AUTO: 0.07 THOUSAND/ÂΜL (ref 0–0.61)
EOSINOPHIL NFR BLD AUTO: 1 % (ref 0–6)
ERYTHROCYTE [DISTWIDTH] IN BLOOD BY AUTOMATED COUNT: 15.9 % (ref 11.6–15.1)
EST. AVERAGE GLUCOSE BLD GHB EST-MCNC: 108 MG/DL
GFR SERPL CREATININE-BSD FRML MDRD: 96 ML/MIN/1.73SQ M
GLUCOSE P FAST SERPL-MCNC: 86 MG/DL (ref 65–99)
HBA1C MFR BLD: 5.4 %
HCT VFR BLD AUTO: 41.7 % (ref 34.8–46.1)
HDLC SERPL-MCNC: 93 MG/DL
HGB BLD-MCNC: 13.3 G/DL (ref 11.5–15.4)
IMM GRANULOCYTES # BLD AUTO: 0.01 THOUSAND/UL (ref 0–0.2)
IMM GRANULOCYTES NFR BLD AUTO: 0 % (ref 0–2)
LDLC SERPL CALC-MCNC: 109 MG/DL (ref 0–100)
LYMPHOCYTES # BLD AUTO: 1.77 THOUSANDS/ÂΜL (ref 0.6–4.47)
LYMPHOCYTES NFR BLD AUTO: 29 % (ref 14–44)
MCH RBC QN AUTO: 28.6 PG (ref 26.8–34.3)
MCHC RBC AUTO-ENTMCNC: 31.9 G/DL (ref 31.4–37.4)
MCV RBC AUTO: 90 FL (ref 82–98)
MONOCYTES # BLD AUTO: 0.33 THOUSAND/ÂΜL (ref 0.17–1.22)
MONOCYTES NFR BLD AUTO: 5 % (ref 4–12)
NEUTROPHILS # BLD AUTO: 4.02 THOUSANDS/ÂΜL (ref 1.85–7.62)
NEUTS SEG NFR BLD AUTO: 65 % (ref 43–75)
NONHDLC SERPL-MCNC: 121 MG/DL
NRBC BLD AUTO-RTO: 0 /100 WBCS
PLATELET # BLD AUTO: 466 THOUSANDS/UL (ref 149–390)
PMV BLD AUTO: 9.4 FL (ref 8.9–12.7)
POTASSIUM SERPL-SCNC: 3.8 MMOL/L (ref 3.5–5.3)
PROT SERPL-MCNC: 7.2 G/DL (ref 6.4–8.4)
RBC # BLD AUTO: 4.65 MILLION/UL (ref 3.81–5.12)
SODIUM SERPL-SCNC: 137 MMOL/L (ref 135–147)
TRIGL SERPL-MCNC: 61 MG/DL (ref ?–150)
TSH SERPL DL<=0.05 MIU/L-ACNC: 1.31 UIU/ML (ref 0.45–4.5)
WBC # BLD AUTO: 6.22 THOUSAND/UL (ref 4.31–10.16)

## 2025-07-05 PROCEDURE — 80053 COMPREHEN METABOLIC PANEL: CPT

## 2025-07-05 PROCEDURE — 85025 COMPLETE CBC W/AUTO DIFF WBC: CPT

## 2025-07-05 PROCEDURE — 83036 HEMOGLOBIN GLYCOSYLATED A1C: CPT

## 2025-07-05 PROCEDURE — 36415 COLL VENOUS BLD VENIPUNCTURE: CPT

## 2025-07-05 PROCEDURE — 84443 ASSAY THYROID STIM HORMONE: CPT

## 2025-07-05 PROCEDURE — 80061 LIPID PANEL: CPT

## 2025-07-05 PROCEDURE — 82306 VITAMIN D 25 HYDROXY: CPT

## 2025-07-10 ENCOUNTER — OFFICE VISIT (OUTPATIENT)
Dept: BARIATRICS | Facility: CLINIC | Age: 40
End: 2025-07-10
Payer: COMMERCIAL

## 2025-07-10 VITALS
DIASTOLIC BLOOD PRESSURE: 78 MMHG | HEART RATE: 102 BPM | WEIGHT: 171.6 LBS | SYSTOLIC BLOOD PRESSURE: 100 MMHG | BODY MASS INDEX: 33.69 KG/M2 | HEIGHT: 60 IN

## 2025-07-10 DIAGNOSIS — E66.812 CLASS 2 SEVERE OBESITY DUE TO EXCESS CALORIES WITH SERIOUS COMORBIDITY AND BODY MASS INDEX (BMI) OF 38.0 TO 38.9 IN ADULT (HCC): Primary | ICD-10-CM

## 2025-07-10 DIAGNOSIS — E66.01 CLASS 2 SEVERE OBESITY DUE TO EXCESS CALORIES WITH SERIOUS COMORBIDITY AND BODY MASS INDEX (BMI) OF 38.0 TO 38.9 IN ADULT (HCC): Primary | ICD-10-CM

## 2025-07-10 PROCEDURE — 99214 OFFICE O/P EST MOD 30 MIN: CPT | Performed by: NURSE PRACTITIONER

## 2025-07-10 NOTE — ASSESSMENT & PLAN NOTE
- Patient is pursuing Conservative Program and follow up visits with medical weight management provider  - Initial weight loss goal of 5-10% weight loss for improved health. Weight loss can improve patient's co-morbid conditions and/or prevent weight-related complications.  - Explained the importance of continuing lifestyle changes in addition to any anti-obesity medications.   - Labs reviewed from 1/2024 and 2/2024 -new labs ordered today     General Recommendations:  Nutrition:  Eat breakfast daily.  Do not skip meals.      Food log (ie.) www.[email or web address], sparkpeople.com, loseit.com, calorieking.com, etc.     Practice mindful eating.  Be sure to set aside time to eat, eat slowly, and savor your food.     Hydration:    At least 64oz of water daily.  No sugar sweetened beverages.  No juice (eat the fruit instead).     Exercise:  Studies have shown that the ideal exercise goal is somewhere between 150 to 300 minutes of moderate intensity exercise a week.  Start with exercising 10 minutes every other day and gradually increase physical activity with a goal of at least 150 minutes of moderate intensity exercise a week, divided over at least 3 days a week.  An example of this would be exercising 30 minutes a day, 5 days a week.  Resistance training can increase muscle mass and increase our resting metabolic rate.   FULL BODY resistance training is recommended 2-3 times a week.  Do not do this on consecutive days to allow for muscle recovery.     Aim for a bare minimum 5000 steps, even on days you do not exercise.     Monitoring:   Weigh yourself daily.  If this causes undue stress, then just weigh yourself once a week.  Weigh yourself the same time of the day with the same amount of clothing on.  Preferably this should be done after waking up, before you eat, and with no clothing or minimal clothing on.     Specific Goals:  Calorie goal:  4245-3584 isamar/day   Patient lifestyle habits were reviewed and she was  congratulated on her weight loss since last visit.  Nutrition was discussed and patient will continue to balance her calories throughout the day focusing on high-protein foods and adequate fiber.    Activity was discussed and she was encouraged to make sure she is doing strength training 2 to 3 days a week to maintain muscle mass since starting GLP-1 therapy.  Medications were discussed and patient will maintain on Zepbound 5 mg will space the dose out every 10 days to see if this will help improve her side effects.  If she continues to have food aversions and nausea may consider appealing to keep patient at the lowest dose of 2.5 mg as she did not have the side effects at this dose.  Patient will notify the office in 1 month after spacing out the dose to update me regarding her side effects.  She will follow-up in the office in 3 months.

## 2025-07-10 NOTE — PROGRESS NOTES
Assessment/Plan:     Class 2 severe obesity due to excess calories with serious comorbidity and body mass index (BMI) of 38.0 to 38.9 in adult (HCC)  - Patient is pursuing Conservative Program and follow up visits with medical weight management provider  - Initial weight loss goal of 5-10% weight loss for improved health. Weight loss can improve patient's co-morbid conditions and/or prevent weight-related complications.  - Explained the importance of continuing lifestyle changes in addition to any anti-obesity medications.   - Labs reviewed from 1/2024 and 2/2024 -new labs ordered today     General Recommendations:  Nutrition:  Eat breakfast daily.  Do not skip meals.      Food log (ie.) [email or web address], sparkpeople.com, loseit.com, calorieking.com, etc.     Practice mindful eating.  Be sure to set aside time to eat, eat slowly, and savor your food.     Hydration:    At least 64oz of water daily.  No sugar sweetened beverages.  No juice (eat the fruit instead).     Exercise:  Studies have shown that the ideal exercise goal is somewhere between 150 to 300 minutes of moderate intensity exercise a week.  Start with exercising 10 minutes every other day and gradually increase physical activity with a goal of at least 150 minutes of moderate intensity exercise a week, divided over at least 3 days a week.  An example of this would be exercising 30 minutes a day, 5 days a week.  Resistance training can increase muscle mass and increase our resting metabolic rate.   FULL BODY resistance training is recommended 2-3 times a week.  Do not do this on consecutive days to allow for muscle recovery.     Aim for a bare minimum 5000 steps, even on days you do not exercise.     Monitoring:   Weigh yourself daily.  If this causes undue stress, then just weigh yourself once a week.  Weigh yourself the same time of the day with the same amount of clothing on.  Preferably this should be done after waking up, before you eat, and with  "no clothing or minimal clothing on.     Specific Goals:  Calorie goal:  [id number] isamar/day   Patient lifestyle habits were reviewed and she was congratulated on her weight loss since last visit.  Nutrition was discussed and patient will continue to balance her calories throughout the day focusing on high-protein foods and adequate fiber.    Activity was discussed and she was encouraged to make sure she is doing strength training 2 to 3 days a week to maintain muscle mass since starting GLP-1 therapy.  Medications were discussed and patient will maintain on Zepbound 5 mg will space the dose out every 10 days to see if this will help improve her side effects.  If she continues to have food aversions and nausea may consider appealing to keep patient at the lowest dose of 2.5 mg as she did not have the side effects at this dose.  Patient will notify the office in 1 month after spacing out the dose to update me regarding her side effects.  She will follow-up in the office in 3 months.         Marina was seen today for follow-up.    Diagnoses and all orders for this visit:    Class 2 severe obesity due to excess calories with serious comorbidity and body mass index (BMI) of 38.0 to 38.9 in adult (HCC)        Total time spent reviewing chart, interviewing patient, examining patient, discussing plan, answering all questions, and documentin minutes with >50% face-to-face time with the patient.    Follow up in approximately 3 months with Non-Surgical Physician/Advanced Practitioner.    Subjective:   Chief Complaint   Patient presents with    Follow-up     Pt is here for MWM f/u. Waist - 35.5\"       Patient ID: Marina Bhagat  is a 40 y.o. female with excess weight/obesity here to pursue weight management.  Patient is pursuing Conservative Program.   Most recent notes and records were reviewed.    HPI    Wt Readings from Last 20 Encounters:   07/10/25 77.8 kg (171 lb 9.6 oz)   25 83 kg (183 lb)   25 87.8 " kg (193 lb 9.6 oz)   10/16/24 83.1 kg (183 lb 3.2 oz)   08/21/24 84.4 kg (186 lb)   05/13/24 81.9 kg (180 lb 9.6 oz)   04/15/24 80.3 kg (177 lb)   02/12/24 79.7 kg (175 lb 9.6 oz)   12/12/23 79.5 kg (175 lb 3.2 oz)   11/13/23 77.1 kg (170 lb)   08/14/23 75.8 kg (167 lb 3.2 oz)   07/12/23 77.5 kg (170 lb 12.8 oz)   05/26/23 77 kg (169 lb 12.8 oz)   04/26/23 78.1 kg (172 lb 3.2 oz)   04/17/23 78.3 kg (172 lb 9.6 oz)   03/28/23 78.7 kg (173 lb 9.6 oz)   02/28/23 79.7 kg (175 lb 12.8 oz)   02/21/23 78.4 kg (172 lb 12.8 oz)   01/24/23 81.1 kg (178 lb 12.8 oz)   01/23/23 81.5 kg (179 lb 9.6 oz)       Patient presents today to medical weight management office for follow up.  Patient was started on Zepbound at last office visit after having been on phentermine for the past couple of years but not seeing weight loss.  She is currently on Zepbound 5 mg and does notice some persistent side effects including nausea and food aversions.  She is happy with her weight loss but does not feel well 2 to 3 days out of the week after her injection.  She is staying consistent with her nutrition and is trying to increase her protein which is difficult due to her nausea.  She has not had any episodes of vomiting or abdominal pain.  Patient would like to maintain on this therapy but would like to discuss decreasing the dose.  Patient's side effects were much less on the 2.5 mg.        Weight loss medication and dose: Zepbound 5 mg   Initial weight: 174.2 lbs in 6/2022 (193.6 lbs at start of Zepbound)  Current weight: 171.6 lbs (193.6 lbs last MWM OV)  Change in weight: -2.2 lbs (-22 lbs since lat OV)  Percentage of weight loss: -11.3%  Goal: 120 lbs    Starting BMI: 34.6 in 6/2022  Current BMI: 36.38    Waist Measurements:  8/2023: 35 in  8/2024: 39 in  7/2025: 35.5 in      MWM Weight Tracker:  2/2024: 176.5 lbs (+0.6 lbs)  4/2024: 177 lbs (+0.5 lbs)  8/2024: 186 lbs (+9 lbs) *start phentermine 8mg  10/2024: 183.2 lbs (-2.8 lbs)  4/2025:  "193.6 (+10.4 lbs) *start of Zepbound  7/2025: 171/6 lbs (-22 lbs)      Nutrition Prescription  Calories: 1,000-1,200 kcal  Protein: 60-85 g  Fluid: 52+ ounces      B- skips OR instant grits and fried egg  S- HB eggs sometimes  L- sandwich and cookie  S- humus with baby carrots  D- factor meals  S- popcorn    Hydration- black tea in the morning, water - 4 cups  Alcohol- rarely  Exercise- 30 minutes 5 days a week and 15 minute walk with dog      The following portions of the patient's history were reviewed and updated as appropriate: allergies, current medications, past family history, past medical history, past social history, past surgical history, and problem list.    Family History[1]     Review of Systems   Constitutional:  Negative for fatigue.   HENT:  Negative for sore throat.    Respiratory:  Negative for cough and shortness of breath.    Cardiovascular:  Negative for chest pain, palpitations and leg swelling.   Gastrointestinal:  Negative for abdominal pain, constipation, diarrhea and nausea.   Genitourinary:  Negative for dysuria.   Musculoskeletal:  Negative for arthralgias and back pain.   Skin:  Negative for rash.   Neurological:  Negative for headaches.   Psychiatric/Behavioral:  Negative for dysphoric mood. The patient is not nervous/anxious.        Objective:  /78 (Patient Position: Sitting, Cuff Size: Large)   Pulse 102   Ht 4' 11.5\" (1.511 m)   Wt 77.8 kg (171 lb 9.6 oz)   LMP 07/03/2025 (Exact Date)   BMI 34.08 kg/m²     Physical Exam  Vitals and nursing note reviewed.   Constitutional:       Appearance: Normal appearance. She is obese.   HENT:      Head: Normocephalic.   Pulmonary:      Effort: Pulmonary effort is normal.     Neurological:      General: No focal deficit present.      Mental Status: She is alert and oriented to person, place, and time.     Psychiatric:         Mood and Affect: Mood normal.         Behavior: Behavior normal.         Thought Content: Thought content " normal.         Judgment: Judgment normal.            Labs   Most recent labs reviewed   Lab Results   Component Value Date    SODIUM 137 07/05/2025    K 3.8 07/05/2025     07/05/2025    CO2 27 07/05/2025    AGAP 6 07/05/2025    BUN 8 07/05/2025    CREATININE 0.78 07/05/2025    GLUC 118 01/10/2023    GLUF 86 07/05/2025    CALCIUM 9.0 07/05/2025    AST 13 07/05/2025    ALT 15 07/05/2025    ALKPHOS 64 07/05/2025    TP 7.2 07/05/2025    TBILI 0.38 07/05/2025    EGFR 96 07/05/2025     Lab Results   Component Value Date    HGBA1C 5.4 07/05/2025     Lab Results   Component Value Date    UMH8VEEPLKQP 1.306 07/05/2025    TSH 2.49 06/07/2022     Lab Results   Component Value Date    CHOLESTEROL 214 (H) 07/05/2025     Lab Results   Component Value Date    HDL 93 07/05/2025     Lab Results   Component Value Date    TRIG 61 07/05/2025     Lab Results   Component Value Date    LDLCALC 109 (H) 07/05/2025          [1]   Family History  Problem Relation Name Age of Onset    Depression Mother Tera Olayinka     Colorectal Cancer Mother Tera Bhagat 65    Cancer Mother Tera Bhagat         Colorectal    Mental illness Mother Tera Bhaagt         Depression    Basal cell carcinoma Father Blaine Bhagat     COPD Father Blaine Bhagat     No Known Problems Brother      Dementia Maternal Grandmother Helen Zenia     Dementia Maternal Grandfather Buddy Knutson     Colon cancer Maternal Uncle      Breast cancer Cousin maternal     Cancer Maternal Uncle Serafin Knutson         Colon    Ovarian cancer Neg Hx

## 2025-08-03 DIAGNOSIS — E55.9 VITAMIN D DEFICIENCY: ICD-10-CM

## 2025-08-03 DIAGNOSIS — E66.812 CLASS 2 SEVERE OBESITY DUE TO EXCESS CALORIES WITH SERIOUS COMORBIDITY AND BODY MASS INDEX (BMI) OF 38.0 TO 38.9 IN ADULT (HCC): ICD-10-CM

## 2025-08-03 DIAGNOSIS — E66.01 CLASS 2 SEVERE OBESITY DUE TO EXCESS CALORIES WITH SERIOUS COMORBIDITY AND BODY MASS INDEX (BMI) OF 38.0 TO 38.9 IN ADULT (HCC): ICD-10-CM

## 2025-08-04 RX ORDER — ERGOCALCIFEROL 1.25 MG/1
50000 CAPSULE, LIQUID FILLED ORAL WEEKLY
Qty: 12 CAPSULE | Refills: 0 | Status: SHIPPED | OUTPATIENT
Start: 2025-08-04 | End: 2025-10-21

## 2025-08-04 RX ORDER — TIRZEPATIDE 5 MG/.5ML
5 INJECTION, SOLUTION SUBCUTANEOUS WEEKLY
Qty: 6 ML | Refills: 0 | Status: SHIPPED | OUTPATIENT
Start: 2025-08-04 | End: 2025-10-27